# Patient Record
Sex: FEMALE | Race: WHITE | Employment: UNEMPLOYED | URBAN - METROPOLITAN AREA
[De-identification: names, ages, dates, MRNs, and addresses within clinical notes are randomized per-mention and may not be internally consistent; named-entity substitution may affect disease eponyms.]

---

## 2017-02-09 DIAGNOSIS — E78.00 HYPERCHOLESTEREMIA: ICD-10-CM

## 2017-02-09 RX ORDER — ATORVASTATIN CALCIUM 10 MG/1
TABLET, FILM COATED ORAL
Qty: 90 TABLET | Refills: 0 | Status: SHIPPED | OUTPATIENT
Start: 2017-02-09

## 2017-06-03 ENCOUNTER — HOSPITAL ENCOUNTER (OUTPATIENT)
Dept: MRI IMAGING | Age: 82
Discharge: HOME | End: 2017-06-03
Payer: MEDICARE

## 2017-06-03 DIAGNOSIS — I63.9 CEREBROVASCULAR ACCIDENT (CVA), UNSPECIFIED MECHANISM (HCC): ICD-10-CM

## 2017-06-03 PROCEDURE — 70551 MRI BRAIN STEM W/O DYE: CPT

## 2017-09-18 ENCOUNTER — TELEPHONE (OUTPATIENT)
Dept: ADMINISTRATIVE | Age: 82
End: 2017-09-18

## 2017-10-24 ENCOUNTER — OFFICE VISIT (OUTPATIENT)
Dept: FAMILY MEDICINE CLINIC | Age: 82
End: 2017-10-24

## 2017-10-24 VITALS
RESPIRATION RATE: 16 BRPM | DIASTOLIC BLOOD PRESSURE: 70 MMHG | HEART RATE: 54 BPM | BODY MASS INDEX: 32.8 KG/M2 | TEMPERATURE: 96.7 F | WEIGHT: 209 LBS | OXYGEN SATURATION: 98 % | HEIGHT: 67 IN | SYSTOLIC BLOOD PRESSURE: 110 MMHG

## 2017-10-24 DIAGNOSIS — Z78.0 ASYMPTOMATIC MENOPAUSAL STATE: ICD-10-CM

## 2017-10-24 DIAGNOSIS — Z13.6 SCREENING FOR AAA (ABDOMINAL AORTIC ANEURYSM): ICD-10-CM

## 2017-10-24 DIAGNOSIS — Z12.31 ENCOUNTER FOR SCREENING MAMMOGRAM FOR BREAST CANCER: ICD-10-CM

## 2017-10-24 DIAGNOSIS — E11.9 TYPE 2 DIABETES MELLITUS WITHOUT COMPLICATION, WITHOUT LONG-TERM CURRENT USE OF INSULIN (HCC): ICD-10-CM

## 2017-10-24 DIAGNOSIS — Z23 NEED FOR PROPHYLACTIC VACCINATION AGAINST STREPTOCOCCUS PNEUMONIAE (PNEUMOCOCCUS): ICD-10-CM

## 2017-10-24 DIAGNOSIS — Z00.00 ROUTINE GENERAL MEDICAL EXAMINATION AT A HEALTH CARE FACILITY: Primary | ICD-10-CM

## 2017-10-24 DIAGNOSIS — E55.9 VITAMIN D DEFICIENCY: ICD-10-CM

## 2017-10-24 DIAGNOSIS — Z11.3 SCREEN FOR SEXUALLY TRANSMITTED DISEASES: ICD-10-CM

## 2017-10-24 PROBLEM — S52.502A CLOSED FRACTURE OF DISTAL END OF LEFT RADIUS: Status: ACTIVE | Noted: 2017-07-19

## 2017-10-24 PROCEDURE — G8599 NO ASA/ANTIPLAT THER USE RNG: HCPCS | Performed by: FAMILY MEDICINE

## 2017-10-24 PROCEDURE — G0438 PPPS, INITIAL VISIT: HCPCS | Performed by: FAMILY MEDICINE

## 2017-10-24 PROCEDURE — 90670 PCV13 VACCINE IM: CPT | Performed by: FAMILY MEDICINE

## 2017-10-24 PROCEDURE — G0009 ADMIN PNEUMOCOCCAL VACCINE: HCPCS | Performed by: FAMILY MEDICINE

## 2017-10-24 RX ORDER — PROMETHAZINE HYDROCHLORIDE AND CODEINE PHOSPHATE 6.25; 1 MG/5ML; MG/5ML
5 SYRUP ORAL 4 TIMES DAILY PRN
Qty: 240 ML | Refills: 0 | Status: SHIPPED | OUTPATIENT
Start: 2017-10-24 | End: 2018-01-01 | Stop reason: SDUPTHER

## 2017-10-24 ASSESSMENT — ANXIETY QUESTIONNAIRES: GAD7 TOTAL SCORE: 0

## 2017-10-24 ASSESSMENT — LIFESTYLE VARIABLES: HOW OFTEN DO YOU HAVE A DRINK CONTAINING ALCOHOL: 0

## 2017-10-24 ASSESSMENT — PATIENT HEALTH QUESTIONNAIRE - PHQ9: SUM OF ALL RESPONSES TO PHQ QUESTIONS 1-9: 0

## 2017-10-24 NOTE — PATIENT INSTRUCTIONS
Personalized Preventive Plan for [place] - 10/24/2017  Medicare offers a range of preventive health benefits. Some of the tests and screenings are paid in full while other may be subject to a deductible, co-insurance, and/or copay. Some of these benefits include a comprehensive review of your medical history including lifestyle, illnesses that may run in your family, and various assessments and screenings as appropriate. After reviewing your medical record and screening and assessments performed today your provider may have ordered immunizations, labs, imaging, and/or referrals for you. A list of these orders (if applicable) as well as your Preventive Care list are included within your After Visit Summary for your review. Other Preventive Recommendations:    · A preventive eye exam performed by an eye specialist is recommended every 1-2 years to screen for glaucoma; cataracts, macular degeneration, and other eye disorders. · A preventive dental visit is recommended every 6 months. · Try to get at least 150 minutes of exercise per week or 10,000 steps per day on a pedometer . · Order or download the FREE \"Exercise & Physical Activity: Your Everyday Guide\" from [employer]. Call 6-450.138.3825 or search [employer] online. · You need 9325-9912 mg of calcium and 4588-8361 IU of vitamin D per day. It is possible to meet your calcium requirement with diet alone, but a vitamin D supplement is usually necessary to meet this goal.  · When exposed to the sun, use a sunscreen that protects against both UVA and UVB radiation with an SPF of 30 or greater. Reapply every 2 to 3 hours or after sweating, drying off with a towel, or swimming. Always wear a seat belt when traveling in a car. Always wear a helmet when riding a bicycle or motorcycle.      Preventing Falls: Care Instructions  Your Care Instructions  Getting around your home safely can be a challenge if you have injuries or health problems that make it easy for you to fall. Loose rugs and furniture in walkways are among the dangers for many older people who have problems walking or who have poor eyesight. People who have conditions such as arthritis, osteoporosis, or dementia also have to be careful not to fall. You can make your home safer with a few simple measures. Follow-up care is a key part of your treatment and safety. Be sure to make and go to all appointments, and call your doctor if you are having problems. It's also a good idea to know your test results and keep a list of the medicines you take. How can you care for yourself at home? Taking care of yourself  You may get dizzy if you do not drink enough water. To prevent dehydration, drink plenty of fluids, enough so that your urine is light yellow or clear like water. Choose water and other caffeine-free clear liquids. If you have kidney, heart, or liver disease and have to limit fluids, talk with your doctor before you increase the amount of fluids you drink. Exercise regularly to improve your strength, muscle tone, and balance. Walk if you can. Swimming may be a good choice if you cannot walk easily. Have your vision and hearing checked each year or any time you notice a change. If you have trouble seeing and hearing, you might not be able to avoid objects and could lose your balance. Know the side effects of the medicines you take. Ask your doctor or pharmacist whether the medicines you take can affect your balance. Sleeping pills or sedatives can affect your balance. Limit the amount of alcohol you drink. Alcohol can impair your balance and other senses. Ask your doctor whether calluses or corns on your feet need to be removed. If you wear loose-fitting shoes because of calluses or corns, you can lose your balance and fall. Talk to your doctor if you have numbness in your feet.   Preventing falls at home  Remove raised doorway thresholds, throw rugs, and clutter. Repair loose carpet or raised areas in the floor. Move furniture and electrical cords to keep them out of walking paths. Use nonskid floor wax, and wipe up spills right away, especially on ceramic tile floors. If you use a walker or cane, put rubber tips on it. If you use crutches, clean the bottoms of them regularly with an abrasive pad, such as steel wool. Keep your house well lit, especially stairways, porches, and outside walkways. Use night-lights in areas such as hallways and bathrooms. Add extra light switches or use remote switches (such as switches that go on or off when you clap your hands) to make it easier to turn lights on if you have to get up during the night. Install sturdy handrails on stairways. Move items in your cabinets so that the things you use a lot are on the lower shelves (about waist level). Keep a cordless phone and a flashlight with new batteries by your bed. If possible, put a phone in each of the main rooms of your house, or carry a cell phone in case you fall and cannot reach a phone. Or, you can wear a device around your neck or wrist. You push a button that sends a signal for help. Wear low-heeled shoes that fit well and give your feet good support. Use footwear with nonskid soles. Check the heels and soles of your shoes for wear. Repair or replace worn heels or soles. Do not wear socks without shoes on wood floors. Walk on the grass when the sidewalks are slippery. If you live in an area that gets snow and ice in the winter, sprinkle salt on slippery steps and sidewalks. Preventing falls in the bath  Install grab bars and nonskid mats inside and outside your shower or tub and near the toilet and sinks. Use shower chairs and bath benches. Use a hand-held shower head that will allow you to sit while showering.   Get into a tub or shower by putting the weaker leg in first. Get out of a tub or shower with your strong side first.  Repair loose toilet seats and consider installing a raised toilet seat to make getting on and off the toilet easier. Keep your bathroom door unlocked while you are in the shower. Where can you learn more? Go to [email or web address] and sign in to your [employer] account. Enter [phone] in the [facility] box to learn more about \"Preventing Falls: Care Instructions. \"     If you do not have an account, please click on the \"Sign Up Now\" link. Current as of: August 4, 2016  Content Version: 11.3  © 7293-2580 [employer]. Care instructions adapted under license by [facility] ([facility]). If you have questions about a medical condition or this instruction, always ask your healthcare professional. [address] any warranty or liability for your use of this information.   ·

## 2017-10-24 NOTE — PROGRESS NOTES
°C)   TempSrc: Tympanic   SpO2: 98%   Weight: 209 lb (94.8 kg)   Height: 5' 7\" (1.702 m)       General Appearance: alert and oriented to person, place and time, well developed and well- nourished, in no acute distress  Skin: warm and dry, no rash or erythema  Head: normocephalic and atraumatic  Eyes: pupils equal, round, and reactive to light, extraocular eye movements intact, conjunctivae normal  ENT: tympanic membrane, external ear and ear canal normal bilaterally, nose without deformity, nasal mucosa and turbinates normal without polyps  Neck: supple and non-tender without mass, no thyromegaly or thyroid nodules, no cervical lymphadenopathy  Pulmonary/Chest: clear to auscultation bilaterally- no wheezes, rales or rhonchi, normal air movement, no respiratory distress  Cardiovascular: normal rate, regular rhythm, normal S1 and S2, no murmurs, rubs, clicks, or gallops, distal pulses intact, no carotid bruits  Abdomen: soft, non-tender, non-distended, normal bowel sounds, no masses or organomegaly  Extremities: no cyanosis, clubbing or edema  Musculoskeletal: normal range of motion, no joint swelling, deformity or tenderness  Neurologic: reflexes normal and symmetric, no cranial nerve deficit, gait, coordination and speech normal    The following problems were reviewed today and where indicated follow up appointments were made and/or referrals ordered.     Risk Factor Screenings with Interventions:   Fall Risk:  Timed Up and Go Test > 12 seconds?: no  2 or more falls in past year?: no  Fall with injury in past year?: (!) yes  Fall Risk Interventions:    · Home safety tips provided  · Imaging study ordered- DEXA    Depression:  PHQ-2 Score: 0  Depression Interventions:  · None indicated    Anxiety:  Anxiety Score: 0  Anxiety Interventions:  · None indicated    Cognitive:     Cognitive Impairment Interventions:  · None indicated    Substance Abuse:  Social History     Social History Main Topics    Smoking status: Never Smoker    Smokeless tobacco: Never Used    Alcohol use No    Drug use: No    Sexual activity: Not on file     Audit Questionnaire: Screen for Alcohol Misuse  How often do you have a drink containing alcohol?: Never  Substance Abuse Interventions:  · None indicated    Health Risk Assessment:   General  In general, how would you say your health is?: Good  In the past 7 days, have you experienced any of the following?: None of These  Do you get the social and emotional support that you need?: Yes  Do you have a Living Will?: Yes  General Health Risk Interventions:  · None indicated    Health Habits/Nutrition  Do you exercise for at least 20 minutes 2-3 times per week?: (!) No  Have you lost any weight without trying in the past 3 months?: No  Do you eat fewer than 2 meals per day?: No  Have you seen a dentist within the past year?: (!) No  Body mass index is 32.73 kg/m².   Health Habits/Nutrition Interventions:  · Inadequate physical activity:  patient agrees to increase physical activity as follows: as tolerated  · Dental exam overdue:  patient declines dental evaluation    Hearing/Vision  Do you or your family notice any trouble with your hearing?: (!) Yes  Do you have difficulty driving, watching TV, or doing any of your daily activities because of your eyesight?: No  Have you had an eye exam within the past year?: (!) No  Hearing/Vision Interventions:  · Hearing concerns:  patient declines any further evaluation/treatment for hearing issues  · Vision concerns:  patient encouraged to make appointment with his/her eye specialist    Safety  Do you have working smoke detectors?: Yes  Have all throw rugs been removed or fastened?: Yes  Do you have non-slip mats in all bathtubs?: Yes  Do all of your stairways have a railing or banister?: Yes  Are your doorways, halls and stairs free of clutter?: Yes  Do you always fasten your seatbelt when you are in a car?: Yes  Safety Interventions:  · Home safety tips provided    ADLs  In the past 7 days, did you need help from others to perform any of the following everyday activities?: None  In the past 7 days, did you need help from others to take care of any of the following?: None  ADL Interventions:  · None indicated    Personalized Preventive Plan   Current Health Maintenance Status  Immunization History   Administered Date(s) Administered    Influenza Virus Vaccine 09/30/2013, 10/01/2014    Influenza, High Dose 10/01/2015    Pneumococcal Polysaccharide ([id number]) 08/06/2014    Zoster 10/31/2011        Health Maintenance   Topic Date Due    Pneumococcal low/med risk (2 of 2 - PCV13) 08/06/2015    Flu vaccine (1) 11/24/2017 (Originally 9/1/2017)    DTaP/Tdap/Td vaccine (1 - Tdap) 10/24/2018 (Originally 10/25/1954)    Zostavax vaccine  Completed    DEXA (modify frequency per FRAX score)  Completed     Recommendations for Preventive Services Due: see orders.   Recommended screening schedule for the next 5-10 years is provided to the patient in written form: see Patient Instructions/AVS.

## 2017-11-09 ENCOUNTER — TELEPHONE (OUTPATIENT)
Dept: FAMILY MEDICINE CLINIC | Age: 82
End: 2017-11-09

## 2017-11-14 ENCOUNTER — HOSPITAL ENCOUNTER (OUTPATIENT)
Dept: WOMENS IMAGING | Age: 82
Discharge: HOME | End: 2017-11-14
Payer: MEDICARE

## 2017-11-14 ENCOUNTER — HOSPITAL ENCOUNTER (OUTPATIENT)
Dept: ULTRASOUND IMAGING | Age: 82
Discharge: HOME | End: 2017-11-14
Payer: MEDICARE

## 2017-11-14 DIAGNOSIS — Z78.0 ASYMPTOMATIC MENOPAUSAL STATE: ICD-10-CM

## 2017-11-14 DIAGNOSIS — Z13.6 SCREENING FOR AAA (ABDOMINAL AORTIC ANEURYSM): ICD-10-CM

## 2017-11-14 DIAGNOSIS — Z12.31 ENCOUNTER FOR SCREENING MAMMOGRAM FOR BREAST CANCER: ICD-10-CM

## 2017-11-14 PROCEDURE — 77080 DXA BONE DENSITY AXIAL: CPT

## 2017-11-14 PROCEDURE — 77063 BREAST TOMOSYNTHESIS BI: CPT

## 2017-11-14 PROCEDURE — 76706 US ABDL AORTA SCREEN AAA: CPT

## 2017-12-29 ENCOUNTER — APPOINTMENT (OUTPATIENT)
Dept: GENERAL RADIOLOGY | Age: 82
DRG: 470 | End: 2017-12-29
Payer: MEDICARE

## 2017-12-29 ENCOUNTER — HOSPITAL ENCOUNTER (INPATIENT)
Age: 82
LOS: 4 days | Discharge: NURSING, REHAB OR LONG-TERM CARE | DRG: 470 | End: 2018-01-02
Attending: EMERGENCY MEDICINE | Admitting: ORTHOPAEDIC SURGERY
Payer: MEDICARE

## 2017-12-29 DIAGNOSIS — S72.001A CLOSED FRACTURE OF RIGHT HIP, INITIAL ENCOUNTER (HCC): Primary | ICD-10-CM

## 2017-12-29 DIAGNOSIS — S60.211A CONTUSION OF RIGHT WRIST, INITIAL ENCOUNTER: ICD-10-CM

## 2017-12-29 LAB
ABO/RH: NORMAL
ALBUMIN SERPL-MCNC: 3.7 G/DL (ref 3.9–4.9)
ALBUMIN SERPL-MCNC: 3.9 G/DL (ref 3.9–4.9)
ALP BLD-CCNC: 89 U/L (ref 40–130)
ALP BLD-CCNC: 96 U/L (ref 40–130)
ALT SERPL-CCNC: 14 U/L (ref 0–33)
ALT SERPL-CCNC: 14 U/L (ref 0–33)
ANION GAP SERPL CALCULATED.3IONS-SCNC: 13 MEQ/L (ref 7–13)
ANION GAP SERPL CALCULATED.3IONS-SCNC: 14 MEQ/L (ref 7–13)
ANTIBODY SCREEN: NORMAL
APTT: 25.2 SEC (ref 21.6–35.4)
AST SERPL-CCNC: 16 U/L (ref 0–35)
AST SERPL-CCNC: 17 U/L (ref 0–35)
BACTERIA: NORMAL /HPF
BASOPHILS ABSOLUTE: 0 K/UL (ref 0–0.2)
BASOPHILS RELATIVE PERCENT: 0.3 %
BILIRUB SERPL-MCNC: 0.5 MG/DL (ref 0–1.2)
BILIRUB SERPL-MCNC: 0.5 MG/DL (ref 0–1.2)
BILIRUBIN URINE: NEGATIVE
BLOOD, URINE: NEGATIVE
BUN BLDV-MCNC: 21 MG/DL (ref 8–23)
BUN BLDV-MCNC: 22 MG/DL (ref 8–23)
CALCIUM SERPL-MCNC: 10.5 MG/DL (ref 8.6–10.2)
CALCIUM SERPL-MCNC: 9.9 MG/DL (ref 8.6–10.2)
CHLORIDE BLD-SCNC: 101 MEQ/L (ref 98–107)
CHLORIDE BLD-SCNC: 103 MEQ/L (ref 98–107)
CLARITY: CLEAR
CO2: 21 MEQ/L (ref 22–29)
CO2: 21 MEQ/L (ref 22–29)
COLOR: YELLOW
CREAT SERPL-MCNC: 0.67 MG/DL (ref 0.5–0.9)
CREAT SERPL-MCNC: 0.74 MG/DL (ref 0.5–0.9)
EOSINOPHILS ABSOLUTE: 0 K/UL (ref 0–0.7)
EOSINOPHILS RELATIVE PERCENT: 0.1 %
EPITHELIAL CELLS, UA: NORMAL /HPF
GFR AFRICAN AMERICAN: >60
GFR AFRICAN AMERICAN: >60
GFR NON-AFRICAN AMERICAN: >60
GFR NON-AFRICAN AMERICAN: >60
GLOBULIN: 2.6 G/DL (ref 2.3–3.5)
GLOBULIN: 2.8 G/DL (ref 2.3–3.5)
GLUCOSE BLD-MCNC: 169 MG/DL (ref 74–109)
GLUCOSE BLD-MCNC: 173 MG/DL (ref 74–109)
GLUCOSE URINE: NEGATIVE MG/DL
HCT VFR BLD CALC: 36.5 % (ref 37–47)
HEMOGLOBIN: 12 G/DL (ref 12–16)
INR BLD: 1
KETONES, URINE: ABNORMAL MG/DL
LEUKOCYTE ESTERASE, URINE: ABNORMAL
LYMPHOCYTES ABSOLUTE: 0.7 K/UL (ref 1–4.8)
LYMPHOCYTES RELATIVE PERCENT: 6.2 %
MCH RBC QN AUTO: 29.4 PG (ref 27–31.3)
MCHC RBC AUTO-ENTMCNC: 32.9 % (ref 33–37)
MCV RBC AUTO: 89.5 FL (ref 82–100)
MONOCYTES ABSOLUTE: 0.9 K/UL (ref 0.2–0.8)
MONOCYTES RELATIVE PERCENT: 7.5 %
NEUTROPHILS ABSOLUTE: 9.7 K/UL (ref 1.4–6.5)
NEUTROPHILS RELATIVE PERCENT: 85.9 %
NITRITE, URINE: NEGATIVE
PDW BLD-RTO: 14.4 % (ref 11.5–14.5)
PH UA: 7.5 (ref 5–9)
PLATELET # BLD: 401 K/UL (ref 130–400)
POTASSIUM SERPL-SCNC: 4.3 MEQ/L (ref 3.5–5.1)
POTASSIUM SERPL-SCNC: 4.3 MEQ/L (ref 3.5–5.1)
PROTEIN UA: NEGATIVE MG/DL
PROTHROMBIN TIME: 10.9 SEC (ref 8.1–13.7)
RBC # BLD: 4.08 M/UL (ref 4.2–5.4)
RBC UA: NORMAL /HPF (ref 0–2)
SODIUM BLD-SCNC: 135 MEQ/L (ref 132–144)
SODIUM BLD-SCNC: 138 MEQ/L (ref 132–144)
SPECIFIC GRAVITY UA: 1.01 (ref 1–1.03)
TOTAL PROTEIN: 6.3 G/DL (ref 6.4–8.1)
TOTAL PROTEIN: 6.7 G/DL (ref 6.4–8.1)
TROPONIN: <0.01 NG/ML (ref 0–0.01)
URINE REFLEX TO CULTURE: YES
UROBILINOGEN, URINE: 1 E.U./DL
WBC # BLD: 11.3 K/UL (ref 4.8–10.8)
WBC UA: NORMAL /HPF (ref 0–5)

## 2017-12-29 PROCEDURE — 87086 URINE CULTURE/COLONY COUNT: CPT

## 2017-12-29 PROCEDURE — 36415 COLL VENOUS BLD VENIPUNCTURE: CPT

## 2017-12-29 PROCEDURE — 73552 X-RAY EXAM OF FEMUR 2/>: CPT

## 2017-12-29 PROCEDURE — 99284 EMERGENCY DEPT VISIT MOD MDM: CPT

## 2017-12-29 PROCEDURE — 71010 XR CHEST LIMITED: CPT

## 2017-12-29 PROCEDURE — 85610 PROTHROMBIN TIME: CPT

## 2017-12-29 PROCEDURE — 85730 THROMBOPLASTIN TIME PARTIAL: CPT

## 2017-12-29 PROCEDURE — 2580000003 HC RX 258: Performed by: EMERGENCY MEDICINE

## 2017-12-29 PROCEDURE — 84484 ASSAY OF TROPONIN QUANT: CPT

## 2017-12-29 PROCEDURE — 6360000002 HC RX W HCPCS: Performed by: EMERGENCY MEDICINE

## 2017-12-29 PROCEDURE — 86900 BLOOD TYPING SEROLOGIC ABO: CPT

## 2017-12-29 PROCEDURE — 86920 COMPATIBILITY TEST SPIN: CPT

## 2017-12-29 PROCEDURE — 86901 BLOOD TYPING SEROLOGIC RH(D): CPT

## 2017-12-29 PROCEDURE — 96361 HYDRATE IV INFUSION ADD-ON: CPT

## 2017-12-29 PROCEDURE — 96375 TX/PRO/DX INJ NEW DRUG ADDON: CPT

## 2017-12-29 PROCEDURE — 85025 COMPLETE CBC W/AUTO DIFF WBC: CPT

## 2017-12-29 PROCEDURE — 80053 COMPREHEN METABOLIC PANEL: CPT

## 2017-12-29 PROCEDURE — 81001 URINALYSIS AUTO W/SCOPE: CPT

## 2017-12-29 PROCEDURE — 1210000000 HC MED SURG R&B

## 2017-12-29 PROCEDURE — 72170 X-RAY EXAM OF PELVIS: CPT

## 2017-12-29 PROCEDURE — 73562 X-RAY EXAM OF KNEE 3: CPT

## 2017-12-29 PROCEDURE — 96374 THER/PROPH/DIAG INJ IV PUSH: CPT

## 2017-12-29 PROCEDURE — 86850 RBC ANTIBODY SCREEN: CPT

## 2017-12-29 RX ORDER — OXYCODONE HYDROCHLORIDE AND ACETAMINOPHEN 5; 325 MG/1; MG/1
1 TABLET ORAL EVERY 4 HOURS PRN
Status: DISCONTINUED | OUTPATIENT
Start: 2017-12-29 | End: 2018-01-02 | Stop reason: HOSPADM

## 2017-12-29 RX ORDER — 0.9 % SODIUM CHLORIDE 0.9 %
500 INTRAVENOUS SOLUTION INTRAVENOUS ONCE
Status: COMPLETED | OUTPATIENT
Start: 2017-12-29 | End: 2017-12-29

## 2017-12-29 RX ORDER — OXYCODONE HYDROCHLORIDE AND ACETAMINOPHEN 5; 325 MG/1; MG/1
2 TABLET ORAL EVERY 4 HOURS PRN
Status: DISCONTINUED | OUTPATIENT
Start: 2017-12-29 | End: 2018-01-02 | Stop reason: HOSPADM

## 2017-12-29 RX ORDER — ONDANSETRON 2 MG/ML
4 INJECTION INTRAMUSCULAR; INTRAVENOUS EVERY 6 HOURS PRN
Status: DISCONTINUED | OUTPATIENT
Start: 2017-12-29 | End: 2018-01-02 | Stop reason: HOSPADM

## 2017-12-29 RX ORDER — ONDANSETRON 2 MG/ML
4 INJECTION INTRAMUSCULAR; INTRAVENOUS ONCE
Status: COMPLETED | OUTPATIENT
Start: 2017-12-29 | End: 2017-12-29

## 2017-12-29 RX ORDER — MORPHINE SULFATE 4 MG/ML
4 INJECTION, SOLUTION INTRAMUSCULAR; INTRAVENOUS
Status: DISCONTINUED | OUTPATIENT
Start: 2017-12-29 | End: 2018-01-02 | Stop reason: HOSPADM

## 2017-12-29 RX ORDER — SODIUM CHLORIDE 9 MG/ML
INJECTION, SOLUTION INTRAVENOUS CONTINUOUS
Status: DISCONTINUED | OUTPATIENT
Start: 2017-12-29 | End: 2018-01-02 | Stop reason: HOSPADM

## 2017-12-29 RX ORDER — SODIUM CHLORIDE 0.9 % (FLUSH) 0.9 %
3 SYRINGE (ML) INJECTION EVERY 8 HOURS
Status: DISCONTINUED | OUTPATIENT
Start: 2017-12-29 | End: 2018-01-02 | Stop reason: HOSPADM

## 2017-12-29 RX ORDER — SODIUM CHLORIDE 0.9 % (FLUSH) 0.9 %
10 SYRINGE (ML) INJECTION EVERY 12 HOURS SCHEDULED
Status: DISCONTINUED | OUTPATIENT
Start: 2017-12-29 | End: 2017-12-30 | Stop reason: HOSPADM

## 2017-12-29 RX ORDER — MORPHINE SULFATE 2 MG/ML
2 INJECTION, SOLUTION INTRAMUSCULAR; INTRAVENOUS
Status: DISCONTINUED | OUTPATIENT
Start: 2017-12-29 | End: 2018-01-02 | Stop reason: HOSPADM

## 2017-12-29 RX ORDER — SODIUM CHLORIDE 0.9 % (FLUSH) 0.9 %
10 SYRINGE (ML) INJECTION PRN
Status: DISCONTINUED | OUTPATIENT
Start: 2017-12-29 | End: 2017-12-30 | Stop reason: HOSPADM

## 2017-12-29 RX ADMIN — Medication 2 MG: at 23:54

## 2017-12-29 RX ADMIN — ONDANSETRON 4 MG: 2 INJECTION INTRAMUSCULAR; INTRAVENOUS at 20:42

## 2017-12-29 RX ADMIN — SODIUM CHLORIDE, PRESERVATIVE FREE 10 ML: 5 INJECTION INTRAVENOUS at 20:42

## 2017-12-29 RX ADMIN — SODIUM CHLORIDE 500 ML: 9 INJECTION, SOLUTION INTRAVENOUS at 20:42

## 2017-12-29 RX ADMIN — HYDROMORPHONE HYDROCHLORIDE 1 MG: 1 INJECTION, SOLUTION INTRAMUSCULAR; INTRAVENOUS; SUBCUTANEOUS at 20:42

## 2017-12-29 RX ADMIN — SODIUM CHLORIDE: 9 INJECTION, SOLUTION INTRAVENOUS at 23:52

## 2017-12-29 ASSESSMENT — PAIN SCALES - GENERAL
PAINLEVEL_OUTOF10: 0
PAINLEVEL_OUTOF10: 0
PAINLEVEL_OUTOF10: 7
PAINLEVEL_OUTOF10: 7

## 2017-12-29 ASSESSMENT — PAIN DESCRIPTION - LOCATION: LOCATION: HIP

## 2017-12-29 ASSESSMENT — PAIN DESCRIPTION - PAIN TYPE: TYPE: ACUTE PAIN

## 2017-12-29 ASSESSMENT — PAIN DESCRIPTION - ORIENTATION: ORIENTATION: RIGHT

## 2017-12-30 ENCOUNTER — APPOINTMENT (OUTPATIENT)
Dept: GENERAL RADIOLOGY | Age: 82
DRG: 470 | End: 2017-12-30
Payer: MEDICARE

## 2017-12-30 ENCOUNTER — ANESTHESIA (OUTPATIENT)
Dept: OPERATING ROOM | Age: 82
DRG: 470 | End: 2017-12-30
Payer: MEDICARE

## 2017-12-30 ENCOUNTER — ANESTHESIA EVENT (OUTPATIENT)
Dept: OPERATING ROOM | Age: 82
DRG: 470 | End: 2017-12-30
Payer: MEDICARE

## 2017-12-30 VITALS
RESPIRATION RATE: 16 BRPM | OXYGEN SATURATION: 98 % | TEMPERATURE: 97.3 F | DIASTOLIC BLOOD PRESSURE: 95 MMHG | SYSTOLIC BLOOD PRESSURE: 214 MMHG

## 2017-12-30 LAB
ANION GAP SERPL CALCULATED.3IONS-SCNC: 12 MEQ/L (ref 7–13)
BASOPHILS ABSOLUTE: 0 K/UL (ref 0–0.2)
BASOPHILS RELATIVE PERCENT: 0.4 %
BUN BLDV-MCNC: 20 MG/DL (ref 8–23)
CALCIUM SERPL-MCNC: 9.4 MG/DL (ref 8.6–10.2)
CHLORIDE BLD-SCNC: 105 MEQ/L (ref 98–107)
CO2: 22 MEQ/L (ref 22–29)
CREAT SERPL-MCNC: 0.71 MG/DL (ref 0.5–0.9)
EKG ATRIAL RATE: 77 BPM
EKG P AXIS: 22 DEGREES
EKG P-R INTERVAL: 178 MS
EKG Q-T INTERVAL: 408 MS
EKG QRS DURATION: 100 MS
EKG QTC CALCULATION (BAZETT): 461 MS
EKG R AXIS: -17 DEGREES
EKG T AXIS: 87 DEGREES
EKG VENTRICULAR RATE: 77 BPM
EOSINOPHILS ABSOLUTE: 0.2 K/UL (ref 0–0.7)
EOSINOPHILS RELATIVE PERCENT: 3.1 %
GFR AFRICAN AMERICAN: >60
GFR NON-AFRICAN AMERICAN: >60
GLUCOSE BLD-MCNC: 125 MG/DL (ref 60–115)
GLUCOSE BLD-MCNC: 126 MG/DL (ref 74–109)
GLUCOSE BLD-MCNC: 156 MG/DL (ref 60–115)
HBA1C MFR BLD: 6.5 % (ref 4.8–5.9)
HCT VFR BLD CALC: 32.8 % (ref 37–47)
HEMOGLOBIN: 11 G/DL (ref 12–16)
LYMPHOCYTES ABSOLUTE: 1.2 K/UL (ref 1–4.8)
LYMPHOCYTES RELATIVE PERCENT: 15.2 %
MCH RBC QN AUTO: 29.9 PG (ref 27–31.3)
MCHC RBC AUTO-ENTMCNC: 33.5 % (ref 33–37)
MCV RBC AUTO: 89.2 FL (ref 82–100)
MONOCYTES ABSOLUTE: 0.8 K/UL (ref 0.2–0.8)
MONOCYTES RELATIVE PERCENT: 10.7 %
NEUTROPHILS ABSOLUTE: 5.5 K/UL (ref 1.4–6.5)
NEUTROPHILS RELATIVE PERCENT: 70.6 %
PDW BLD-RTO: 14.4 % (ref 11.5–14.5)
PERFORMED ON: ABNORMAL
PERFORMED ON: ABNORMAL
PLATELET # BLD: 368 K/UL (ref 130–400)
POTASSIUM SERPL-SCNC: 4.5 MEQ/L (ref 3.5–5.1)
RBC # BLD: 3.68 M/UL (ref 4.2–5.4)
SODIUM BLD-SCNC: 139 MEQ/L (ref 132–144)
WBC # BLD: 7.8 K/UL (ref 4.8–10.8)

## 2017-12-30 PROCEDURE — 2580000003 HC RX 258: Performed by: ORTHOPAEDIC SURGERY

## 2017-12-30 PROCEDURE — 6360000002 HC RX W HCPCS: Performed by: ORTHOPAEDIC SURGERY

## 2017-12-30 PROCEDURE — 6370000000 HC RX 637 (ALT 250 FOR IP): Performed by: EMERGENCY MEDICINE

## 2017-12-30 PROCEDURE — 83036 HEMOGLOBIN GLYCOSYLATED A1C: CPT

## 2017-12-30 PROCEDURE — 3700000001 HC ADD 15 MINUTES (ANESTHESIA): Performed by: ORTHOPAEDIC SURGERY

## 2017-12-30 PROCEDURE — 6360000002 HC RX W HCPCS: Performed by: ANESTHESIOLOGY

## 2017-12-30 PROCEDURE — 85025 COMPLETE CBC W/AUTO DIFF WBC: CPT

## 2017-12-30 PROCEDURE — 6370000000 HC RX 637 (ALT 250 FOR IP): Performed by: NURSE PRACTITIONER

## 2017-12-30 PROCEDURE — 0SRR0J9 REPLACEMENT OF RIGHT HIP JOINT, FEMORAL SURFACE WITH SYNTHETIC SUBSTITUTE, CEMENTED, OPEN APPROACH: ICD-10-PCS | Performed by: ORTHOPAEDIC SURGERY

## 2017-12-30 PROCEDURE — 3700000000 HC ANESTHESIA ATTENDED CARE: Performed by: ORTHOPAEDIC SURGERY

## 2017-12-30 PROCEDURE — C1776 JOINT DEVICE (IMPLANTABLE): HCPCS | Performed by: ORTHOPAEDIC SURGERY

## 2017-12-30 PROCEDURE — 7100000001 HC PACU RECOVERY - ADDTL 15 MIN: Performed by: ORTHOPAEDIC SURGERY

## 2017-12-30 PROCEDURE — 3600000004 HC SURGERY LEVEL 4 BASE: Performed by: ORTHOPAEDIC SURGERY

## 2017-12-30 PROCEDURE — 80048 BASIC METABOLIC PNL TOTAL CA: CPT

## 2017-12-30 PROCEDURE — 36415 COLL VENOUS BLD VENIPUNCTURE: CPT

## 2017-12-30 PROCEDURE — 6370000000 HC RX 637 (ALT 250 FOR IP): Performed by: ORTHOPAEDIC SURGERY

## 2017-12-30 PROCEDURE — 2500000003 HC RX 250 WO HCPCS: Performed by: ANESTHESIOLOGY

## 2017-12-30 PROCEDURE — 3600000014 HC SURGERY LEVEL 4 ADDTL 15MIN: Performed by: ORTHOPAEDIC SURGERY

## 2017-12-30 PROCEDURE — 73502 X-RAY EXAM HIP UNI 2-3 VIEWS: CPT

## 2017-12-30 PROCEDURE — 6370000000 HC RX 637 (ALT 250 FOR IP): Performed by: INTERNAL MEDICINE

## 2017-12-30 PROCEDURE — C1729 CATH, DRAINAGE: HCPCS | Performed by: ORTHOPAEDIC SURGERY

## 2017-12-30 PROCEDURE — C1713 ANCHOR/SCREW BN/BN,TIS/BN: HCPCS | Performed by: ORTHOPAEDIC SURGERY

## 2017-12-30 PROCEDURE — 1210000000 HC MED SURG R&B

## 2017-12-30 PROCEDURE — 2580000003 HC RX 258: Performed by: ANESTHESIOLOGY

## 2017-12-30 PROCEDURE — 2720000010 HC SURG SUPPLY STERILE: Performed by: ORTHOPAEDIC SURGERY

## 2017-12-30 PROCEDURE — 93005 ELECTROCARDIOGRAM TRACING: CPT

## 2017-12-30 PROCEDURE — 7100000000 HC PACU RECOVERY - FIRST 15 MIN: Performed by: ORTHOPAEDIC SURGERY

## 2017-12-30 DEVICE — IMPLANTABLE DEVICE: Type: IMPLANTABLE DEVICE | Status: FUNCTIONAL

## 2017-12-30 DEVICE — VERSYS DISTAL CENTRALIZER 10MM: Type: IMPLANTABLE DEVICE | Status: FUNCTIONAL

## 2017-12-30 DEVICE — CEMENT RESTRICTOR C/M 16MM: Type: IMPLANTABLE DEVICE | Status: FUNCTIONAL

## 2017-12-30 DEVICE — CEMENT BNE 20ML 40GM ACRYL RESIN HI VISC RADPQ FAST SET: Type: IMPLANTABLE DEVICE | Status: FUNCTIONAL

## 2017-12-30 DEVICE — VERSYS CEM LD/FX SZ 12X125MM: Type: IMPLANTABLE DEVICE | Status: FUNCTIONAL

## 2017-12-30 RX ORDER — MAGNESIUM HYDROXIDE 1200 MG/15ML
LIQUID ORAL CONTINUOUS PRN
Status: DISCONTINUED | OUTPATIENT
Start: 2017-12-30 | End: 2017-12-30 | Stop reason: HOSPADM

## 2017-12-30 RX ORDER — FENTANYL CITRATE 50 UG/ML
50 INJECTION, SOLUTION INTRAMUSCULAR; INTRAVENOUS EVERY 10 MIN PRN
Status: DISCONTINUED | OUTPATIENT
Start: 2017-12-30 | End: 2017-12-30 | Stop reason: HOSPADM

## 2017-12-30 RX ORDER — SODIUM CHLORIDE 0.9 % (FLUSH) 0.9 %
10 SYRINGE (ML) INJECTION EVERY 12 HOURS SCHEDULED
Status: DISCONTINUED | OUTPATIENT
Start: 2017-12-30 | End: 2018-01-02 | Stop reason: HOSPADM

## 2017-12-30 RX ORDER — ONDANSETRON 2 MG/ML
4 INJECTION INTRAMUSCULAR; INTRAVENOUS EVERY 6 HOURS PRN
Status: DISCONTINUED | OUTPATIENT
Start: 2017-12-30 | End: 2017-12-30 | Stop reason: SDUPTHER

## 2017-12-30 RX ORDER — DIPHENHYDRAMINE HYDROCHLORIDE 50 MG/ML
12.5 INJECTION INTRAMUSCULAR; INTRAVENOUS
Status: DISCONTINUED | OUTPATIENT
Start: 2017-12-30 | End: 2017-12-30 | Stop reason: HOSPADM

## 2017-12-30 RX ORDER — LIDOCAINE HYDROCHLORIDE 10 MG/ML
INJECTION, SOLUTION EPIDURAL; INFILTRATION; INTRACAUDAL; PERINEURAL PRN
Status: DISCONTINUED | OUTPATIENT
Start: 2017-12-30 | End: 2017-12-30 | Stop reason: SDUPTHER

## 2017-12-30 RX ORDER — ONDANSETRON 2 MG/ML
INJECTION INTRAMUSCULAR; INTRAVENOUS PRN
Status: DISCONTINUED | OUTPATIENT
Start: 2017-12-30 | End: 2017-12-30 | Stop reason: SDUPTHER

## 2017-12-30 RX ORDER — MORPHINE SULFATE 4 MG/ML
4 INJECTION, SOLUTION INTRAMUSCULAR; INTRAVENOUS
Status: DISCONTINUED | OUTPATIENT
Start: 2017-12-30 | End: 2017-12-30 | Stop reason: SDUPTHER

## 2017-12-30 RX ORDER — KETOROLAC TROMETHAMINE 15 MG/ML
15 INJECTION, SOLUTION INTRAMUSCULAR; INTRAVENOUS EVERY 6 HOURS
Status: DISPENSED | OUTPATIENT
Start: 2017-12-30 | End: 2017-12-31

## 2017-12-30 RX ORDER — ATORVASTATIN CALCIUM 10 MG/1
10 TABLET, FILM COATED ORAL DAILY
Status: DISCONTINUED | OUTPATIENT
Start: 2017-12-30 | End: 2018-01-02 | Stop reason: HOSPADM

## 2017-12-30 RX ORDER — MORPHINE SULFATE 2 MG/ML
2 INJECTION, SOLUTION INTRAMUSCULAR; INTRAVENOUS
Status: DISCONTINUED | OUTPATIENT
Start: 2017-12-30 | End: 2017-12-30 | Stop reason: SDUPTHER

## 2017-12-30 RX ORDER — ACETAMINOPHEN 325 MG/1
650 TABLET ORAL EVERY 4 HOURS PRN
Status: DISCONTINUED | OUTPATIENT
Start: 2017-12-30 | End: 2018-01-02 | Stop reason: HOSPADM

## 2017-12-30 RX ORDER — DOCUSATE SODIUM 100 MG/1
100 CAPSULE, LIQUID FILLED ORAL 2 TIMES DAILY
Status: DISCONTINUED | OUTPATIENT
Start: 2017-12-30 | End: 2018-01-02 | Stop reason: HOSPADM

## 2017-12-30 RX ORDER — METOCLOPRAMIDE HYDROCHLORIDE 5 MG/ML
10 INJECTION INTRAMUSCULAR; INTRAVENOUS
Status: DISCONTINUED | OUTPATIENT
Start: 2017-12-30 | End: 2017-12-30 | Stop reason: HOSPADM

## 2017-12-30 RX ORDER — FENTANYL CITRATE 50 UG/ML
INJECTION, SOLUTION INTRAMUSCULAR; INTRAVENOUS PRN
Status: DISCONTINUED | OUTPATIENT
Start: 2017-12-30 | End: 2017-12-30 | Stop reason: SDUPTHER

## 2017-12-30 RX ORDER — SODIUM CHLORIDE, SODIUM LACTATE, POTASSIUM CHLORIDE, CALCIUM CHLORIDE 600; 310; 30; 20 MG/100ML; MG/100ML; MG/100ML; MG/100ML
INJECTION, SOLUTION INTRAVENOUS CONTINUOUS PRN
Status: DISCONTINUED | OUTPATIENT
Start: 2017-12-30 | End: 2017-12-30 | Stop reason: SDUPTHER

## 2017-12-30 RX ORDER — HYDROCODONE BITARTRATE AND ACETAMINOPHEN 5; 325 MG/1; MG/1
1 TABLET ORAL PRN
Status: DISCONTINUED | OUTPATIENT
Start: 2017-12-30 | End: 2017-12-30 | Stop reason: HOSPADM

## 2017-12-30 RX ORDER — DEXTROSE MONOHYDRATE 50 MG/ML
100 INJECTION, SOLUTION INTRAVENOUS PRN
Status: DISCONTINUED | OUTPATIENT
Start: 2017-12-30 | End: 2018-01-02 | Stop reason: HOSPADM

## 2017-12-30 RX ORDER — ROCURONIUM BROMIDE 10 MG/ML
INJECTION, SOLUTION INTRAVENOUS PRN
Status: DISCONTINUED | OUTPATIENT
Start: 2017-12-30 | End: 2017-12-30 | Stop reason: SDUPTHER

## 2017-12-30 RX ORDER — OXYCODONE HYDROCHLORIDE AND ACETAMINOPHEN 5; 325 MG/1; MG/1
2 TABLET ORAL EVERY 4 HOURS PRN
Status: DISCONTINUED | OUTPATIENT
Start: 2017-12-30 | End: 2017-12-30 | Stop reason: SDUPTHER

## 2017-12-30 RX ORDER — OXYCODONE HYDROCHLORIDE AND ACETAMINOPHEN 5; 325 MG/1; MG/1
1 TABLET ORAL EVERY 4 HOURS PRN
Status: DISCONTINUED | OUTPATIENT
Start: 2017-12-30 | End: 2017-12-30 | Stop reason: SDUPTHER

## 2017-12-30 RX ORDER — SODIUM CHLORIDE 9 MG/ML
INJECTION, SOLUTION INTRAVENOUS CONTINUOUS
Status: DISCONTINUED | OUTPATIENT
Start: 2017-12-30 | End: 2018-01-02 | Stop reason: HOSPADM

## 2017-12-30 RX ORDER — HYDROCODONE BITARTRATE AND ACETAMINOPHEN 5; 325 MG/1; MG/1
2 TABLET ORAL PRN
Status: DISCONTINUED | OUTPATIENT
Start: 2017-12-30 | End: 2017-12-30 | Stop reason: HOSPADM

## 2017-12-30 RX ORDER — MEPERIDINE HYDROCHLORIDE 25 MG/ML
12.5 INJECTION INTRAMUSCULAR; INTRAVENOUS; SUBCUTANEOUS EVERY 5 MIN PRN
Status: DISCONTINUED | OUTPATIENT
Start: 2017-12-30 | End: 2017-12-30 | Stop reason: HOSPADM

## 2017-12-30 RX ORDER — ONDANSETRON 2 MG/ML
4 INJECTION INTRAMUSCULAR; INTRAVENOUS
Status: DISCONTINUED | OUTPATIENT
Start: 2017-12-30 | End: 2017-12-30 | Stop reason: HOSPADM

## 2017-12-30 RX ORDER — SENNA AND DOCUSATE SODIUM 50; 8.6 MG/1; MG/1
1 TABLET, FILM COATED ORAL DAILY
Status: DISCONTINUED | OUTPATIENT
Start: 2017-12-30 | End: 2018-01-02 | Stop reason: HOSPADM

## 2017-12-30 RX ORDER — DEXTROSE MONOHYDRATE 25 G/50ML
12.5 INJECTION, SOLUTION INTRAVENOUS PRN
Status: DISCONTINUED | OUTPATIENT
Start: 2017-12-30 | End: 2018-01-02 | Stop reason: HOSPADM

## 2017-12-30 RX ORDER — NICOTINE POLACRILEX 4 MG
15 LOZENGE BUCCAL PRN
Status: DISCONTINUED | OUTPATIENT
Start: 2017-12-30 | End: 2018-01-02 | Stop reason: HOSPADM

## 2017-12-30 RX ORDER — SODIUM CHLORIDE 0.9 % (FLUSH) 0.9 %
10 SYRINGE (ML) INJECTION PRN
Status: DISCONTINUED | OUTPATIENT
Start: 2017-12-30 | End: 2018-01-02 | Stop reason: HOSPADM

## 2017-12-30 RX ORDER — PROPOFOL 10 MG/ML
INJECTION, EMULSION INTRAVENOUS PRN
Status: DISCONTINUED | OUTPATIENT
Start: 2017-12-30 | End: 2017-12-30 | Stop reason: SDUPTHER

## 2017-12-30 RX ORDER — CALCIUM CARBONATE 200(500)MG
500 TABLET,CHEWABLE ORAL 3 TIMES DAILY PRN
Status: DISCONTINUED | OUTPATIENT
Start: 2017-12-30 | End: 2018-01-02 | Stop reason: HOSPADM

## 2017-12-30 RX ADMIN — DOCUSATE SODIUM 100 MG: 100 CAPSULE, LIQUID FILLED ORAL at 21:45

## 2017-12-30 RX ADMIN — SODIUM CHLORIDE: 9 INJECTION, SOLUTION INTRAVENOUS at 21:44

## 2017-12-30 RX ADMIN — Medication 0.1 MG: at 14:31

## 2017-12-30 RX ADMIN — LIDOCAINE HYDROCHLORIDE 30 MG: 10 INJECTION, SOLUTION EPIDURAL; INFILTRATION; INTRACAUDAL; PERINEURAL at 13:28

## 2017-12-30 RX ADMIN — ONDANSETRON 4 MG: 2 INJECTION INTRAMUSCULAR; INTRAVENOUS at 14:45

## 2017-12-30 RX ADMIN — HYDROMORPHONE HYDROCHLORIDE 0.5 MG: 1 INJECTION, SOLUTION INTRAMUSCULAR; INTRAVENOUS; SUBCUTANEOUS at 15:44

## 2017-12-30 RX ADMIN — SODIUM CHLORIDE, POTASSIUM CHLORIDE, SODIUM LACTATE AND CALCIUM CHLORIDE: 600; 310; 30; 20 INJECTION, SOLUTION INTRAVENOUS at 13:15

## 2017-12-30 RX ADMIN — FENTANYL CITRATE 50 MCG: 50 INJECTION, SOLUTION INTRAMUSCULAR; INTRAVENOUS at 13:28

## 2017-12-30 RX ADMIN — SODIUM CHLORIDE, PRESERVATIVE FREE 10 ML: 5 INJECTION INTRAVENOUS at 21:45

## 2017-12-30 RX ADMIN — OXYCODONE HYDROCHLORIDE AND ACETAMINOPHEN 1 TABLET: 5; 325 TABLET ORAL at 22:11

## 2017-12-30 RX ADMIN — Medication 0.2 MG: at 14:20

## 2017-12-30 RX ADMIN — ATORVASTATIN CALCIUM 10 MG: 10 TABLET, FILM COATED ORAL at 21:45

## 2017-12-30 RX ADMIN — PROPOFOL 150 MG: 10 INJECTION, EMULSION INTRAVENOUS at 13:28

## 2017-12-30 RX ADMIN — FENTANYL CITRATE 50 MCG: 50 INJECTION, SOLUTION INTRAMUSCULAR; INTRAVENOUS at 14:06

## 2017-12-30 RX ADMIN — VANCOMYCIN HYDROCHLORIDE 1500 MG: 1 INJECTION, POWDER, LYOPHILIZED, FOR SOLUTION INTRAVENOUS at 13:12

## 2017-12-30 RX ADMIN — ROCURONIUM BROMIDE 50 MG: 10 INJECTION INTRAVENOUS at 13:28

## 2017-12-30 RX ADMIN — VANCOMYCIN HYDROCHLORIDE 1.5 G: 1 INJECTION, POWDER, LYOPHILIZED, FOR SOLUTION INTRAVENOUS at 13:24

## 2017-12-30 RX ADMIN — ANTACID TABLETS 500 MG: 500 TABLET, CHEWABLE ORAL at 22:11

## 2017-12-30 RX ADMIN — OXYCODONE HYDROCHLORIDE AND ACETAMINOPHEN 1 TABLET: 5; 325 TABLET ORAL at 08:34

## 2017-12-30 RX ADMIN — SUGAMMADEX 200 MG: 100 INJECTION, SOLUTION INTRAVENOUS at 14:46

## 2017-12-30 RX ADMIN — HYDROMORPHONE HYDROCHLORIDE 0.5 MG: 1 INJECTION, SOLUTION INTRAMUSCULAR; INTRAVENOUS; SUBCUTANEOUS at 15:32

## 2017-12-30 RX ADMIN — KETOROLAC TROMETHAMINE 15 MG: 15 INJECTION, SOLUTION INTRAMUSCULAR; INTRAVENOUS at 17:10

## 2017-12-30 ASSESSMENT — PULMONARY FUNCTION TESTS
PIF_VALUE: 14
PIF_VALUE: 4
PIF_VALUE: 15
PIF_VALUE: 16
PIF_VALUE: 14
PIF_VALUE: 13
PIF_VALUE: 14
PIF_VALUE: 14
PIF_VALUE: 15
PIF_VALUE: 14
PIF_VALUE: 1
PIF_VALUE: 15
PIF_VALUE: 14
PIF_VALUE: 18
PIF_VALUE: 14
PIF_VALUE: 1
PIF_VALUE: 2
PIF_VALUE: 7
PIF_VALUE: 14
PIF_VALUE: 14
PIF_VALUE: 2
PIF_VALUE: 14
PIF_VALUE: 14
PIF_VALUE: 1
PIF_VALUE: 14
PIF_VALUE: 2
PIF_VALUE: 14
PIF_VALUE: 14
PIF_VALUE: 3
PIF_VALUE: 14
PIF_VALUE: 14
PIF_VALUE: 2
PIF_VALUE: 14
PIF_VALUE: 18
PIF_VALUE: 14
PIF_VALUE: 13
PIF_VALUE: 14
PIF_VALUE: 9
PIF_VALUE: 14
PIF_VALUE: 21
PIF_VALUE: 14
PIF_VALUE: 14
PIF_VALUE: 2
PIF_VALUE: 14
PIF_VALUE: 9
PIF_VALUE: 14
PIF_VALUE: 13
PIF_VALUE: 14
PIF_VALUE: 13
PIF_VALUE: 14
PIF_VALUE: 2
PIF_VALUE: 14
PIF_VALUE: 7
PIF_VALUE: 14
PIF_VALUE: 3
PIF_VALUE: 1
PIF_VALUE: 14
PIF_VALUE: 19
PIF_VALUE: 14
PIF_VALUE: 12
PIF_VALUE: 14
PIF_VALUE: 14

## 2017-12-30 ASSESSMENT — PAIN DESCRIPTION - LOCATION
LOCATION: HIP
LOCATION: HIP

## 2017-12-30 ASSESSMENT — PAIN SCALES - GENERAL
PAINLEVEL_OUTOF10: 0
PAINLEVEL_OUTOF10: 5
PAINLEVEL_OUTOF10: 10
PAINLEVEL_OUTOF10: 8
PAINLEVEL_OUTOF10: 0
PAINLEVEL_OUTOF10: 2
PAINLEVEL_OUTOF10: 1

## 2017-12-30 ASSESSMENT — PAIN DESCRIPTION - PAIN TYPE
TYPE: ACUTE PAIN
TYPE: SURGICAL PAIN

## 2017-12-30 ASSESSMENT — PAIN DESCRIPTION - ORIENTATION
ORIENTATION: RIGHT
ORIENTATION: RIGHT

## 2017-12-30 NOTE — CONSULTS
Consult to Cardiology  Consult performed by: Javier Medina ordered by: David Medellin  Assessment/Recommendations: Consult dictated.   Risk/benefit ratio favours proceeding with surgery  thankyou

## 2017-12-30 NOTE — CARE COORDINATION
SPOKE WITH PATIENT. SHE STATES SHE LIVES ALONE. SHE DOES NOT USE ANY EQUIPMENT. SHE DRIVES. SHE FRACTURED HER HIP AND WILL BE HAVING SURGERY TODAY. SHE WILL NEED PT/OT EVALS TOMORROW. HER #1 CHOICE IS [facility], #2 IS [facility]. HAZEL AWARE TO FOLLOW. WILL NEED REHAB ORDER IF APPROPRIATE.   Electronically signed by Joanna Gurrola RN on 12/30/17 at 11:07 AM

## 2017-12-30 NOTE — CONSULTS
Soft and nontender. EXTREMITIES:  Show no edema. There is minimal prominence of superficial  veins in both lower extremities around the ankles. No peripheral edema. Distal pulses are strong and 3+ out of 4+. The patient has right femoral neck fracture. Chest x-ray shows no minimal  bibasilar atelectasis. This patient has had an echocardiogram in January of 2016 that was reported  to show left ventricular ejection fraction of 65% with decreased left  ventricular cavity size, moderate concentric left ventricular hypertrophy,  diastolic dysfunction, left atrial size was measured at 2.9 cm, PA pressure  of 27 mmHg. EKG from today was reviewed, and showed sinus rhythm with supraventricular  premature beats, ID interval 178 msec, QRS duration 100 msec, QTc 461 msec. The patient has right bundle branch block. ASSESSMENT:  The patient with right femoral neck fracture. She is 80 years  old, with no significant cardiac history, with preserved left ventricular  systolic function, greater than 4 METS level of activity, and with no  clinical signs of heart failure, and no symptoms to suggest angina  pectoris, no examination findings or echo findings to suggest critical  valvular heart disease. Her cardiac risk and benefit ratio favors surgery  and may proceed as planned. Thank you for allowing us to participate in the care of this patient,  please do not hesitate to contact if further questions arise.         Jas Rodarte MD    D: 12/30/2017 10:06:58       T: 12/30/2017 12:10:45     [email or web address]  Job#: [id number]     Doc#: [id number]    CC:  DR. Karen Gallo

## 2017-12-30 NOTE — OP NOTE
[address]                       [address] [facility], [address] [facility]                                 OPERATIVE REPORT    PATIENT NAME: Ivan Pillai                     :        1935  MED REC NO:   [id number]                            ROOM:       Q384  ACCOUNT NO:   [de-identified]                           ADMIT DATE: 2017  PROVIDER:     Kenji Biswas MD            DATE OF PROCEDURE:  2017    PREOPERATIVE DIAGNOSIS:  Right femoral neck fracture. POSTOPERATIVE DIAGNOSIS:  Right femoral neck fracture. OPERATION PERFORMED:  Right hip fracture hemiarthroplasty using Terra size  12 LD/Fx cemented femoral stem with 14-mm head adaptor and 50-mm  endoprosthetic head. SURGEON:  Kenji Biswas M.D.    ASSISTANT:  Mary Pena PA-C. Mary Pena PA-C was present  throughout the entire case. Given the nature of the disease process and  the procedure, a skilled surgical first assistant was necessary during the  case. The assistant was necessary to hold retractors and manipulate the  extremity during the procedure. A certified scrub tech was at the back  table managing the instruments and supplies for the surgical case. ANESTHESIA:  General endotracheal.    COMPLICATIONS:  None. INDICATIONS:  The patient is an 58-year-old female who fell injuring her  right hip. She was found to have a displaced femoral neck fracture. Risks  and benefits of operative stabilization were noted with the patient and  family. These include infection, stiffness, nerve damage, continued pain,  as well as need for subsequent operations. Informed consent was obtained  prior to arrival in the operating room. OPERATIVE PROCEDURE:  Upon arrival, the patient was identified. She was  transported from the stretcher to the operating room table and placed in  supine position. A general endotracheal anesthetic was administered by the  anesthesia staff.   Prior to the start of the case, 1.5 gm of vancomycin was  given intravenously. She  was positioned with her left side down in the  lateral decubitus position with all bony prominences adequately padded. Her right hip and leg were prepped and draped in the usual sterile fashion. A standard posterior approach to the right hip was utilized. The incision  was carried through the subcutaneous tissue and hemostasis was obtained. Fascia was incised in line with the skin incisions. Fat overlying the  external rotators was elevated, and a T-shaped capsular incision was made  in the rotators with flaps tagged for later repair. The head and neck were  then everted. A standard neck cut was made 1 cm above the lesser  trochanter. The head and neck were removed and measured to be 50 mm in  diameter. A canal finder was used to approach the femur followed by power  rasping to size 3 and broaching to size 12. Multiple trials were placed  and a +14 head adaptor with a 50-mm endoprosthetic head was noted to fit  appropriately with good stability and range of motion. A calcar reamer was  placed followed by removal of the trial components. A canal finder was  used to approach the femur followed by power rasping to size 3 and  broaching to size 14. The canal was suctioned dry and  cement restrictor  was placed deep within the canal.  The cement was mixed in the usual third  generation fashion. Cement was inserted followed by placement of Terra  size 12 LD/Fx stem, which was held in place until all cement was fully  hardened. The head and neck were then assembled. The hip was reduced and  noted to be stable through full range of motion. The wound was irrigated  with sterile saline. The rotator was repaired using 0 Vicryl suture. The  rotator was then reattached to bone using #5 Ti-Cron through-bone sutures. An 8-inch Hemovac drain was placed deep within the wound.   Fascia was  repaired using 0 Vicryl suture, subcutaneous

## 2017-12-30 NOTE — ED PROVIDER NOTES
emergency physician with the below findings:      I called regarding the wrist at [place] which shows no acute fracture. Hip films here shows a femoral neck fracture on the right side. We will call Dr. Adan Campbell for admission. Patient has an orthopedist, Dr. Halle Nieves, who is not available this weekend, we talked to his partner about this so the alternative, to stay here was very acceptable to the family and they understand that they will get good care. Condition is stable, this is a very pleasant family very pleasant patient  Interpretation per the Radiologist below, if available at the time of this note:    XR KNEE RIGHT (3 VIEWS)   Final Result      XR PELVIS (1-2 VIEWS)   Final Result      XR FEMUR RIGHT (MIN 2 VIEWS)   Final Result      XR CHEST (SINGLE VIEW FRONTAL)   Final Result            ED BEDSIDE ULTRASOUND:   Performed by ED Physician - none    LABS:  Labs Reviewed   COMPREHENSIVE METABOLIC PANEL   CBC WITH AUTO DIFFERENTIAL   TROPONIN   URINE RT REFLEX TO CULTURE       All other labs were within normal range or not returned as of this dictation. EMERGENCY DEPARTMENT COURSE and DIFFERENTIAL DIAGNOSIS/MDM:   Vitals:    Vitals:    12/29/17 1909   BP: (!) 143/87   Pulse: 85   Resp: 16   Temp: 97.8 °F (36.6 °C)   TempSrc: Oral   SpO2: 98%   Weight: 198 lb (89.8 kg)   Height: 5' 7\" (1.702 m)           MDM    CRITICAL CARE TIME   Total Critical Care time was  minutes, excluding separately reportable procedures. There was a high probability of clinically significant/life threatening deterioration in the patient's condition which required my urgent intervention. CONSULTS:  None    PROCEDURES:  Unless otherwise noted below, none     Procedures    FINAL IMPRESSION      1. Closed fracture of right hip, initial encounter ([facility] [address]    2.  Contusion of right wrist, initial encounter          DISPOSITION/PLAN   DISPOSITION Decision To Admit 12/29/2017 08:50:14 PM      PATIENT REFERRED TO:  No follow-up provider

## 2017-12-30 NOTE — ANESTHESIA POSTPROCEDURE EVALUATION
Department of Anesthesiology  Postprocedure Note    Patient: Linda Garcia  MRN: [id number]  YOB: 1935  Date of evaluation: 12/30/2017  Time:  3:05 PM     Procedure Summary     Date:  12/30/17 Room / Location:  [address]    Anesthesia Start:  0056 Anesthesia Stop:  1414    Procedure:  RIGHT HIP HEMIARTHROPLASTY (Right ) Diagnosis:  (DEGENERATIVE HIP)    Surgeon:  Pipo Davis MD Responsible Provider:  Wayne Cadet MD    Anesthesia Type:  general ASA Status:  3          Anesthesia Type: general    Jada Phase I:      Jada Phase II:      Last vitals: Reviewed and per EMR flowsheets.    139/96-76-96%-97.8-17    Anesthesia Post Evaluation    Patient location during evaluation: PACU  Patient participation: complete - patient participated  Level of consciousness: awake and alert  Pain score: 0  Airway patency: patent  Nausea & Vomiting: no nausea and no vomiting  Complications: no  Cardiovascular status: blood pressure returned to baseline and hemodynamically stable  Respiratory status: acceptable and nasal cannula  Hydration status: euvolemic

## 2017-12-30 NOTE — ANESTHESIA PRE PROCEDURE
Department of Anesthesiology  Preprocedure Note       Name:  Olivia Ibanez   Age:  80 y.o.  :  1935                                          MRN:  [id number]         Date:  2017      Surgeon: Dereck Caro):  Gloria Campos MD    Procedure: Procedure(s):  HIP HEMIARTHROPLASTY    Medications prior to admission:   Prior to Admission medications    Medication Sig Start Date End Date Taking? Authorizing Provider   metFORMIN (GLUCOPHAGE) 500 MG tablet TAKE ONE TABLET BY MOUTH TWICE A DAY WITH MEALS 17  Yes Makenna Livingston DO   glucose blood VI test strips (ONE TOUCH ULTRA TEST) strip TEST ONE TIME DAILY Dx: DM II - E11.9 17  Yes Makenna Livingston DO   atorvastatin (LIPITOR) 10 MG tablet TAKE 1 TABLET DAILY 17  Yes Makenna Livingston DO   clobetasol prop emollient base 0.05 % CREA Apply topically 2 times daily 3/25/16  Yes Makenna Livingston DO   Lancets MISC Test once daily  DX: 250.00 14  Yes Makenna Livingston DO   aspirin EC 81 MG EC tablet Take 1 tablet by mouth daily.  13  Yes Scott Comer MD       Current medications:    Current Facility-Administered Medications   Medication Dose Route Frequency Provider Last Rate Last Dose    atorvastatin (LIPITOR) tablet 10 mg  10 mg Oral Daily Wing Estrada Dunne, DO        glucose (GLUTOSE) 40 % oral gel 15 g  15 g Oral PRN [facility] Sedar, DO        dextrose 50 % solution 12.5 g  12.5 g Intravenous PRN Sydney Tomas Sedar, DO        glucagon (rDNA) injection 1 mg  1 mg Intramuscular PRN [facility]        dextrose 5 % solution  100 mL/hr Intravenous PRN Rawrome Tomas Sedar, DO        insulin lispro (HUMALOG) injection vial 0-6 Units  0-6 Units Subcutaneous 4 times per day Sydney Beauchampar, DO        sodium chloride flush 0.9 % injection 3 mL  3 mL Intravenous Q8H Melvin Ramsey MD   10 mL at 17    0.9 % sodium chloride infusion   Intravenous Continuous Melvin Ramsey MD 50 mL/hr at 172      sodium chloride flush 0.9 % injection 10 mL  10 mL Intravenous 2 times per day Lise Lr MD        sodium chloride flush 0.9 % injection 10 mL  10 mL Intravenous PRN Lise Lr MD        oxyCODONE-acetaminophen (PERCOCET) 5-325 MG per tablet 1 tablet  1 tablet Oral Q4H PRN Lise Lr MD   1 tablet at 12/30/17 5836    Or    oxyCODONE-acetaminophen (PERCOCET) 5-325 MG per tablet 2 tablet  2 tablet Oral Q4H PRN Lise Lr MD        morphine injection 2 mg  2 mg Intravenous Q2H PRN Lise Lr MD   2 mg at 12/29/17 2354    Or    morphine injection 4 mg  4 mg Intravenous Q2H PRCLIFF Lr MD        ondansetron [facility]) injection 4 mg  4 mg Intravenous Q6H PRN Lise Lr MD        Influenza Vac Typ A&B Surf Ant (FLUVIRIN) IM injection 0.5 mL  0.5 mL Intramuscular Prior to discharge Sally Nice MD           Allergies:     Allergies   Allergen Reactions    Cephalexin        Problem List:    Patient Active Problem List   Diagnosis Code    Panic attacks F41.0    Varicose veins I83.90    Type 2 diabetes mellitus (HCC) E11.9    Venous stasis dermatitis I87.2    Seborrheic keratosis L82.1    Hypercholesteremia E78.00    Essential hypertension I10    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    History of cataract extraction Z98.49    Blepharitis of right eye H01.003    Punctate keratitis H16.149    Closed fracture of distal end of left radius S52.502A    Vitamin D deficiency E55.9    Fracture of hip, closed, right, initial encounter ([facility] S72.001A       Past Medical History:        Diagnosis Date    Chronic kidney disease     CVA (cerebral vascular accident) ([address] 01/2016    Panic attacks     Type 2 diabetes mellitus without complication ([address]     Urinary incontinence        Past Surgical History:        Procedure Laterality Date    APPENDECTOMY  1952    COLONOSCOPY      EYE SURGERY      FRACTURE SURGERY      L wrist    UPPER GASTROINTESTINAL ENDOSCOPY  4/4/03       Social History:    Social History   Substance Use Topics    Smoking status: Never Smoker    Smokeless tobacco: Never Used    Alcohol use No                                Counseling given: Not Answered      Vital Signs (Current):   Vitals:    12/29/17 1909 12/29/17 2141 12/29/17 2245 12/30/17 0834   BP: (!) 143/87 (!) 140/78 (!) 162/82 (!) 155/80   Pulse: 85 88 86 86   Resp: 16 16 16 18   Temp: 97.8 °F (36.6 °C)  95.7 °F (35.4 °C) 96 °F (35.6 °C)   TempSrc: Oral  Oral Oral   SpO2: 98% 98%     Weight: 198 lb (89.8 kg)  211 lb 6.7 oz (95.9 kg)    Height: 5' 7\" (1.702 m)  5' 7\" (1.702 m)                                               BP Readings from Last 3 Encounters:   12/30/17 (!) 155/80   10/24/17 110/70   12/20/16 122/80       NPO Status: Time of last liquid consumption: 2300                        Time of last solid consumption: 1230                        Date of last liquid consumption: 12/29/17                        Date of last solid food consumption: 12/29/17    BMI:   Wt Readings from Last 3 Encounters:   12/29/17 211 lb 6.7 oz (95.9 kg)   10/24/17 209 lb (94.8 kg)   07/26/16 211 lb (95.7 kg)     Body mass index is 33.11 kg/m².     CBC:   Lab Results   Component Value Date    WBC 7.8 12/30/2017    RBC 3.68 12/30/2017    HGB 11.0 12/30/2017    HCT 32.8 12/30/2017    MCV 89.2 12/30/2017    RDW 14.4 12/30/2017     12/30/2017       CMP:   Lab Results   Component Value Date     12/30/2017    K 4.5 12/30/2017     12/30/2017    CO2 22 12/30/2017    BUN 20 12/30/2017    CREATININE 0.71 12/30/2017    GFRAA >60.0 12/30/2017    LABGLOM >60.0 12/30/2017    GLUCOSE 126 12/30/2017    PROT 6.3 12/29/2017    CALCIUM 9.4 12/30/2017    BILITOT 0.5 12/29/2017    ALKPHOS 89 12/29/2017    AST 16 12/29/2017    ALT 14 12/29/2017       POC Tests:   Recent Labs      12/30/17   0753   POCGLU  125*       Coags:   Lab Results   Component Value Date    PROTIME 10.9 12/29/2017    INR 1.0 12/29/2017    APTT 25.2 12/29/2017       HCG (If Applicable): No results found for: PREGTESTUR, PREGSERUM, HCG, HCGQUANT     ABGs: No results found for: PHART, PO2ART, YTJ4RNA, GYR6XHI, BEART, H6PENFHM     Type & Screen (If Applicable):  No results found for: LABABO, [address]    Anesthesia Evaluation  Patient summary reviewed and Nursing notes reviewed no history of anesthetic complications:   Airway: Mallampati: II  TM distance: >3 FB   Neck ROM: full  Mouth opening: > = 3 FB Dental:    (+) upper dentures and lower dentures      Pulmonary:                              Cardiovascular:    (+) hypertension:,                   Neuro/Psych:   (+) CVA:,             GI/Hepatic/Renal:   (+) morbid obesity          Endo/Other:    (+) Type II DM, , blood dyscrasia: anemia:., .                 Abdominal:           Vascular:                                      Anesthesia Plan      general     ASA 3       Induction: intravenous. MIPS: Postoperative opioids intended and Prophylactic antiemetics administered. Anesthetic plan and risks discussed with patient and child/children.         Attending anesthesiologist reviewed and agrees with Oksana Bentley MD   12/30/2017

## 2017-12-31 ENCOUNTER — APPOINTMENT (OUTPATIENT)
Dept: GENERAL RADIOLOGY | Age: 82
DRG: 470 | End: 2017-12-31
Payer: MEDICARE

## 2017-12-31 PROBLEM — D64.9 POSTOPERATIVE ANEMIA: Status: ACTIVE | Noted: 2017-12-31

## 2017-12-31 PROBLEM — R26.9 GAIT ABNORMALITY: Status: ACTIVE | Noted: 2017-12-31

## 2017-12-31 PROBLEM — Z86.73 HISTORY OF TIA (TRANSIENT ISCHEMIC ATTACK): Status: ACTIVE | Noted: 2017-12-31

## 2017-12-31 LAB
ANION GAP SERPL CALCULATED.3IONS-SCNC: 10 MEQ/L (ref 7–13)
BASOPHILS ABSOLUTE: 0 K/UL (ref 0–0.2)
BASOPHILS RELATIVE PERCENT: 0.5 %
BUN BLDV-MCNC: 21 MG/DL (ref 8–23)
CALCIUM SERPL-MCNC: 9.7 MG/DL (ref 8.6–10.2)
CHLORIDE BLD-SCNC: 103 MEQ/L (ref 98–107)
CO2: 24 MEQ/L (ref 22–29)
CREAT SERPL-MCNC: 0.84 MG/DL (ref 0.5–0.9)
EOSINOPHILS ABSOLUTE: 0.4 K/UL (ref 0–0.7)
EOSINOPHILS RELATIVE PERCENT: 4.7 %
GFR AFRICAN AMERICAN: >60
GFR NON-AFRICAN AMERICAN: >60
GLUCOSE BLD-MCNC: 145 MG/DL (ref 60–115)
GLUCOSE BLD-MCNC: 146 MG/DL (ref 60–115)
GLUCOSE BLD-MCNC: 167 MG/DL (ref 74–109)
GLUCOSE BLD-MCNC: 172 MG/DL (ref 60–115)
GLUCOSE BLD-MCNC: 174 MG/DL (ref 60–115)
GLUCOSE BLD-MCNC: 175 MG/DL (ref 60–115)
HCT VFR BLD CALC: 28 % (ref 37–47)
HEMOGLOBIN: 9.4 G/DL (ref 12–16)
LYMPHOCYTES ABSOLUTE: 1.1 K/UL (ref 1–4.8)
LYMPHOCYTES RELATIVE PERCENT: 13.5 %
MCH RBC QN AUTO: 30.4 PG (ref 27–31.3)
MCHC RBC AUTO-ENTMCNC: 33.7 % (ref 33–37)
MCV RBC AUTO: 90.1 FL (ref 82–100)
MONOCYTES ABSOLUTE: 0.8 K/UL (ref 0.2–0.8)
MONOCYTES RELATIVE PERCENT: 9.8 %
NEUTROPHILS ABSOLUTE: 6 K/UL (ref 1.4–6.5)
NEUTROPHILS RELATIVE PERCENT: 71.5 %
PDW BLD-RTO: 14.3 % (ref 11.5–14.5)
PERFORMED ON: ABNORMAL
PLATELET # BLD: 303 K/UL (ref 130–400)
POTASSIUM SERPL-SCNC: 4.5 MEQ/L (ref 3.5–5.1)
RBC # BLD: 3.11 M/UL (ref 4.2–5.4)
SODIUM BLD-SCNC: 137 MEQ/L (ref 132–144)
URINE CULTURE, ROUTINE: NORMAL
WBC # BLD: 8.4 K/UL (ref 4.8–10.8)

## 2017-12-31 PROCEDURE — 97163 PT EVAL HIGH COMPLEX 45 MIN: CPT

## 2017-12-31 PROCEDURE — 85025 COMPLETE CBC W/AUTO DIFF WBC: CPT

## 2017-12-31 PROCEDURE — 6370000000 HC RX 637 (ALT 250 FOR IP): Performed by: EMERGENCY MEDICINE

## 2017-12-31 PROCEDURE — 73100 X-RAY EXAM OF WRIST: CPT

## 2017-12-31 PROCEDURE — 6370000000 HC RX 637 (ALT 250 FOR IP): Performed by: INTERNAL MEDICINE

## 2017-12-31 PROCEDURE — 2700000000 HC OXYGEN THERAPY PER DAY

## 2017-12-31 PROCEDURE — 99222 1ST HOSP IP/OBS MODERATE 55: CPT | Performed by: PHYSICAL MEDICINE & REHABILITATION

## 2017-12-31 PROCEDURE — G8979 MOBILITY GOAL STATUS: HCPCS

## 2017-12-31 PROCEDURE — 97535 SELF CARE MNGMENT TRAINING: CPT

## 2017-12-31 PROCEDURE — 1210000000 HC MED SURG R&B

## 2017-12-31 PROCEDURE — 6370000000 HC RX 637 (ALT 250 FOR IP): Performed by: PHYSICAL MEDICINE & REHABILITATION

## 2017-12-31 PROCEDURE — 6360000002 HC RX W HCPCS: Performed by: ORTHOPAEDIC SURGERY

## 2017-12-31 PROCEDURE — G8978 MOBILITY CURRENT STATUS: HCPCS

## 2017-12-31 PROCEDURE — 97112 NEUROMUSCULAR REEDUCATION: CPT

## 2017-12-31 PROCEDURE — 80048 BASIC METABOLIC PNL TOTAL CA: CPT

## 2017-12-31 PROCEDURE — 2580000003 HC RX 258: Performed by: ORTHOPAEDIC SURGERY

## 2017-12-31 PROCEDURE — 6360000002 HC RX W HCPCS: Performed by: EMERGENCY MEDICINE

## 2017-12-31 PROCEDURE — 36415 COLL VENOUS BLD VENIPUNCTURE: CPT

## 2017-12-31 PROCEDURE — 6370000000 HC RX 637 (ALT 250 FOR IP): Performed by: ORTHOPAEDIC SURGERY

## 2017-12-31 RX ORDER — LIDOCAINE 50 MG/G
3 PATCH TOPICAL DAILY
Status: DISCONTINUED | OUTPATIENT
Start: 2017-12-31 | End: 2018-01-02 | Stop reason: HOSPADM

## 2017-12-31 RX ORDER — CINNAMON
1 OIL (ML) MISCELLANEOUS 4 TIMES DAILY
Status: DISCONTINUED | OUTPATIENT
Start: 2017-12-31 | End: 2018-01-02 | Stop reason: HOSPADM

## 2017-12-31 RX ADMIN — SENNOSIDES AND DOCUSATE SODIUM 1 TABLET: 8.6; 5 TABLET ORAL at 08:05

## 2017-12-31 RX ADMIN — DOCUSATE SODIUM 100 MG: 100 CAPSULE, LIQUID FILLED ORAL at 08:05

## 2017-12-31 RX ADMIN — OXYCODONE HYDROCHLORIDE AND ACETAMINOPHEN 1 TABLET: 5; 325 TABLET ORAL at 06:17

## 2017-12-31 RX ADMIN — OXYCODONE HYDROCHLORIDE AND ACETAMINOPHEN 1 TABLET: 5; 325 TABLET ORAL at 10:28

## 2017-12-31 RX ADMIN — VANCOMYCIN HYDROCHLORIDE 1500 MG: 1 INJECTION, POWDER, LYOPHILIZED, FOR SOLUTION INTRAVENOUS at 02:19

## 2017-12-31 RX ADMIN — SODIUM CHLORIDE, PRESERVATIVE FREE 10 ML: 5 INJECTION INTRAVENOUS at 19:45

## 2017-12-31 RX ADMIN — DOCUSATE SODIUM 100 MG: 100 CAPSULE, LIQUID FILLED ORAL at 20:11

## 2017-12-31 RX ADMIN — Medication 1 DROP: at 21:36

## 2017-12-31 RX ADMIN — ENOXAPARIN SODIUM 40 MG: 40 INJECTION, SOLUTION INTRAVENOUS; SUBCUTANEOUS at 08:05

## 2017-12-31 RX ADMIN — ATORVASTATIN CALCIUM 10 MG: 10 TABLET, FILM COATED ORAL at 20:11

## 2017-12-31 RX ADMIN — OXYCODONE HYDROCHLORIDE AND ACETAMINOPHEN 1 TABLET: 5; 325 TABLET ORAL at 17:23

## 2017-12-31 RX ADMIN — INSULIN LISPRO 1 UNITS: 100 INJECTION, SOLUTION INTRAVENOUS; SUBCUTANEOUS at 22:37

## 2017-12-31 RX ADMIN — INSULIN LISPRO 1 UNITS: 100 INJECTION, SOLUTION INTRAVENOUS; SUBCUTANEOUS at 08:12

## 2017-12-31 RX ADMIN — SODIUM CHLORIDE, PRESERVATIVE FREE 10 ML: 5 INJECTION INTRAVENOUS at 19:50

## 2017-12-31 RX ADMIN — Medication 2 MG: at 19:48

## 2017-12-31 RX ADMIN — Medication 1 DROP: at 17:23

## 2017-12-31 ASSESSMENT — PAIN SCALES - GENERAL
PAINLEVEL_OUTOF10: 4
PAINLEVEL_OUTOF10: 2
PAINLEVEL_OUTOF10: 5

## 2017-12-31 NOTE — CONSULTS
Physical Medicine & Rehabilitation  Consult Note      Admitting Physician: Marlon Lanza MD    Primary Care Provider: Piyush Mayers DO     Reason for Consult:  Asses rehab needs, promote physical and mental function, and decrease likelihood of re-admit to the hospital after discharge. History of Present Illness:    Kade Garcia is a 80 y.o. female admitted to [facility] on 12/29/2017. The patient is a 80 y.o. female  with a complicated medical history and recently recovered from  left wrist fracture which was treated with ORIF the [address] [place] [address] Street last summer recently fell fracturing her right hip. She states now that her right wrist is in pain and she denies that she had hurt her right wrist last summer she states that it was her left wrist that was injured last summer. I was able to review the [facility] clinic records and indeed that is corrected with her left wrist and was injured last summer. Patient was going out to dinner in [place], had a mechanical fall getting out of her waller, and a subsequent fall. Came to ED found to have right femoral neck fracture. Admitted to Orthopedics. We are consulted for medical management. She has never had cardiac surgery, never had stenting. History of TIA, however no residual deficits. No EtOH, Tobacco. She had left wrist surgery in July without complications. No full dose anticoagulation. Takes asa 81 daily. No other medications. She was taken to surgery by Dr. Chas Cohen. She is now ready to consider rehabilitation options. SHE LIVES ALONE. SHE DOES NOT USE ANY EQUIPMENT. SHE DRIVES. Knee Pain    The incident occurred more than 1 week ago. The pain is present in the left hip. The pain is at a severity of 9/10. The pain is severe. The pain has been intermittent since onset. Associated symptoms include an inability to bear weight, a loss of motion and muscle weakness. Pertinent negatives include no numbness.  The consolidation. The mediastinum is not widened or shifted. The chest  wall is unremarkable. AP view the pelvis was obtained. There is an angulated, displaced right femoral neck fracture, resulting in varus deformity. There are no other fractures. Bone density is low. 2 views of the right femur were obtained. The right femoral neck fracture is resulting in varus deformity. Bone density is low. There are no other fractures in the right femur. 4 views of the right knee were obtained. There is advanced degenerative disease, greatest in the lateral compartment. There is no joint effusion. There is no fracture. Soft tissues are unremarkable. CONCLUSION: RIGHT FEMORAL NECK FRACTURE WITH VARUS DEFORMITY. SMALL BIBASILAR DEPENDENT PULMONARY ATELECTASIS. Xr Hip Right (2-3 Views)    Result Date: 12/30/2017  EXAMINATION: RIGHT HIP portable view supine  CLINICAL HISTORY: Osteoarthritis COMPARISON: None  FINDINGS: Single view submitted There are multiple surgical staples and drain overlying the right hip. Patient is undergone right total hip arthroplasty. Gross satisfactory alignment. No periimplant fracture                                            STATUS POST RIGHT TOTAL HIP ARTHROPLASTY    Xr Femur Right (min 2 Views)    Result Date: 12/29/2017  EXAMINATION: XR CHEST LIMITED, XR FEMUR RIGHT STANDARD, XR PELVIS STANDARD, XR KNEE RIGHT STANDARD CLINICAL HISTORY: Hip fracture. Preop. Immobility. Pain. COMPARISONS: None available. FINDINGS: Single frontal view the chest was obtained with small inspiratory volume, exaggerating heart size and lung density. There is a trace of bibasilar atelectasis. There is no dense consolidation. The mediastinum is not widened or shifted. The chest  wall is unremarkable. AP view the pelvis was obtained. There is an angulated, displaced right femoral neck fracture, resulting in varus deformity. There are no other fractures. Bone density is low. 2 views of the right femur were obtained.  The right 12/31/2017    CO2 24 12/31/2017    BUN 21 12/31/2017    CREATININE 0.84 12/31/2017    CALCIUM 9.7 12/31/2017    LABALBU 3.7 12/29/2017    BILITOT 0.5 12/29/2017    ALKPHOS 89 12/29/2017    AST 16 12/29/2017    ALT 14 12/29/2017     Lab Results   Component Value Date    WBC 8.4 12/31/2017    RBC 3.11 12/31/2017    HGB 9.4 12/31/2017    HCT 28.0 12/31/2017    MCV 90.1 12/31/2017    MCH 30.4 12/31/2017    MCHC 33.7 12/31/2017    RDW 14.3 12/31/2017     12/31/2017    MPV 8.9 03/26/2014     No results found for: VITD25  Lab Results   Component Value Date    COLORU Yellow 12/29/2017    NITRU Negative 12/29/2017    GLUCOSEU Negative 12/29/2017    KETUA TRACE 12/29/2017    UROBILINOGEN 1.0 12/29/2017    BILIRUBINUR Negative 12/29/2017     Lab Results   Component Value Date    PROTIME 10.9 12/29/2017     Lab Results   Component Value Date    INR 1.0 12/29/2017         I discussed results with patient. Current Rehabilitation Assessments:    Rehabilitation:  Physical therapy: FIMS:  Bed Mobility:      Transfers:  ,  ,      FIMS:  ,  ,      Occupational therapy: FIMS:   ,  ,      Speech therapy: FIMS:           Past Medical History:          Diagnosis Date    Chronic kidney disease     [facility]cere[facility]) [facility] 01/2016    Panic attacks     [facility]     Urinary incontinence          Past Surgical History:          Procedure Laterality Date    APPENDECTOMY  1952    COLONOSCOPY      EYE SURGERY      FRACTURE SURGERY      L wrist    UPPER GASTROINTESTINAL ENDOSCOPY  4/4/03         Allergies:      Allergies   Allergen Reactions    Cephalexin         Current Medications:     Current Facility-Administered Medications: atorvastatin (LIPITOR) tablet 10 mg, 10 mg, Oral, Daily  glucose (GLUTOSE) 40 % oral gel 15 g, 15 g, Oral, PRN  dextrose 50 % solution 12.5 g, 12.5 g, Intravenous, PRN  glucagon (rDNA) injection 1 mg, 1 mg, Intramuscular, PRN  dextrose 5 % not hyperactive, not slowed, not withdrawn, not actively hallucinating and not combative. Thought content is not paranoid and not delusional. Cognition and memory are normal. Cognition and memory are not impaired. She does not express impulsivity or inappropriate judgment. She does not exhibit a depressed mood. She expresses no homicidal and no suicidal ideation. She expresses no suicidal plans and no homicidal plans. She is communicative. She exhibits normal recent memory and normal remote memory. She is attentive. Vitals reviewed. Ortho Exam  Neurologic Exam     Mental Status   Oriented to person, place, and time. Speech: not slurred   Level of consciousness: alert  Knowledge: good. Able to name object. Able to read. Able to repeat. Cranial Nerves     CN III, IV, VI   Pupils are equal, round, and reactive to light. Extraocular motions are normal.     Motor Exam     Strength   Strength 5/5 throughout.      Gait, Coordination, and Reflexes     Tremor   Resting tremor: absent  Intention tremor: absent  Action tremor: absent            Diagnostics:    Recent Results (from the past 24 hour(s))   POCT Glucose    Collection Time: 12/30/17  9:51 PM   Result Value Ref Range    POC Glucose 156 (H) 60 - 115 mg/dl    Performed on ACCU-CHEK    POCT Glucose    Collection Time: 12/31/17  1:30 AM   Result Value Ref Range    POC Glucose 175 (H) 60 - 115 mg/dl    Performed on ACCU-CHEK    CBC Auto Differential    Collection Time: 12/31/17  5:29 AM   Result Value Ref Range    WBC 8.4 4.8 - 10.8 K/uL    RBC 3.11 (L) 4.20 - 5.40 M/uL    Hemoglobin 9.4 (L) 12.0 - 16.0 g/dL    Hematocrit 28.0 (L) 37.0 - 47.0 %    MCV 90.1 82.0 - 100.0 fL    MCH 30.4 27.0 - 31.3 pg    MCHC 33.7 33.0 - 37.0 %    RDW 14.3 11.5 - 14.5 %    Platelets 673 139 - 339 K/uL    Neutrophils % 71.5 %    Lymphocytes % 13.5 %    Monocytes % 9.8 %    Eosinophils % 4.7 %    Basophils % 0.5 %    Neutrophils # 6.0 1.4 - 6.5 K/uL    Lymphocytes # 1.1 1.0 - 4.8 wrist-nonweight bearing to the right wrist and to have his x-rays back      Greater than one hour spent looking at old records talked with patient and family. It was my pleasure to evaluate Radhawil Lorena today. Please call [phone] with questions.     Manny Subramanian, DO

## 2018-01-01 ENCOUNTER — OFFICE VISIT (OUTPATIENT)
Dept: FAMILY MEDICINE CLINIC | Age: 83
End: 2018-01-01
Payer: MEDICARE

## 2018-01-01 ENCOUNTER — TELEPHONE (OUTPATIENT)
Dept: FAMILY MEDICINE CLINIC | Age: 83
End: 2018-01-01

## 2018-01-01 VITALS
SYSTOLIC BLOOD PRESSURE: 110 MMHG | BODY MASS INDEX: 31.98 KG/M2 | TEMPERATURE: 98.2 F | WEIGHT: 199 LBS | OXYGEN SATURATION: 98 % | DIASTOLIC BLOOD PRESSURE: 60 MMHG | HEIGHT: 66 IN | HEART RATE: 73 BPM | RESPIRATION RATE: 12 BRPM

## 2018-01-01 VITALS
OXYGEN SATURATION: 98 % | HEIGHT: 66 IN | SYSTOLIC BLOOD PRESSURE: 110 MMHG | DIASTOLIC BLOOD PRESSURE: 64 MMHG | TEMPERATURE: 97.1 F | RESPIRATION RATE: 15 BRPM | HEART RATE: 74 BPM | WEIGHT: 199 LBS | BODY MASS INDEX: 31.98 KG/M2

## 2018-01-01 DIAGNOSIS — E03.4 HYPOTHYROIDISM DUE TO ACQUIRED ATROPHY OF THYROID: ICD-10-CM

## 2018-01-01 DIAGNOSIS — E66.09 CLASS 1 OBESITY DUE TO EXCESS CALORIES WITH SERIOUS COMORBIDITY AND BODY MASS INDEX (BMI) OF 32.0 TO 32.9 IN ADULT: ICD-10-CM

## 2018-01-01 DIAGNOSIS — E21.3 HYPERPARATHYROIDISM (HCC): ICD-10-CM

## 2018-01-01 DIAGNOSIS — I10 ESSENTIAL HYPERTENSION: ICD-10-CM

## 2018-01-01 DIAGNOSIS — Z23 NEEDS FLU SHOT: ICD-10-CM

## 2018-01-01 DIAGNOSIS — E11.9 TYPE 2 DIABETES MELLITUS WITHOUT COMPLICATION, WITHOUT LONG-TERM CURRENT USE OF INSULIN (HCC): Primary | ICD-10-CM

## 2018-01-01 DIAGNOSIS — E55.9 VITAMIN D DEFICIENCY: ICD-10-CM

## 2018-01-01 DIAGNOSIS — R05.9 COUGH: ICD-10-CM

## 2018-01-01 DIAGNOSIS — E11.9 TYPE 2 DIABETES MELLITUS WITHOUT COMPLICATION, WITHOUT LONG-TERM CURRENT USE OF INSULIN (HCC): ICD-10-CM

## 2018-01-01 DIAGNOSIS — R30.0 DYSURIA: ICD-10-CM

## 2018-01-01 DIAGNOSIS — E78.2 MIXED HYPERLIPIDEMIA: ICD-10-CM

## 2018-01-01 DIAGNOSIS — E21.0 HYPERPARATHYROIDISM, PRIMARY (HCC): ICD-10-CM

## 2018-01-01 LAB
ALBUMIN SERPL-MCNC: 3.8 G/DL (ref 3.9–4.9)
ALBUMIN SERPL-MCNC: 3.9 G/DL (ref 3.9–4.9)
ALP BLD-CCNC: 97 U/L (ref 40–130)
ALP BLD-CCNC: 98 U/L (ref 40–130)
ALT SERPL-CCNC: 10 U/L (ref 0–33)
ALT SERPL-CCNC: 12 U/L (ref 0–33)
ANION GAP SERPL CALCULATED.3IONS-SCNC: 11 MEQ/L (ref 7–13)
ANION GAP SERPL CALCULATED.3IONS-SCNC: 11 MEQ/L (ref 7–13)
ANION GAP SERPL CALCULATED.3IONS-SCNC: 12 MEQ/L (ref 7–13)
AST SERPL-CCNC: 14 U/L (ref 0–35)
AST SERPL-CCNC: 17 U/L (ref 0–35)
BACTERIA: ABNORMAL /HPF
BACTERIA: ABNORMAL /HPF
BASOPHILS ABSOLUTE: 0 K/UL (ref 0–0.2)
BASOPHILS RELATIVE PERCENT: 0.5 %
BASOPHILS RELATIVE PERCENT: 0.8 %
BASOPHILS RELATIVE PERCENT: 0.8 %
BILIRUB SERPL-MCNC: 0.5 MG/DL (ref 0–1.2)
BILIRUB SERPL-MCNC: 0.5 MG/DL (ref 0–1.2)
BILIRUBIN URINE: ABNORMAL
BILIRUBIN URINE: NEGATIVE
BLOOD, URINE: ABNORMAL
BLOOD, URINE: NEGATIVE
BUN BLDV-MCNC: 21 MG/DL (ref 8–23)
BUN BLDV-MCNC: 21 MG/DL (ref 8–23)
BUN BLDV-MCNC: 23 MG/DL (ref 8–23)
CALCIUM SERPL-MCNC: 10.5 MG/DL (ref 8.6–10.2)
CALCIUM SERPL-MCNC: 10.9 MG/DL (ref 8.6–10.2)
CALCIUM SERPL-MCNC: 9.6 MG/DL (ref 8.6–10.2)
CHLORIDE BLD-SCNC: 104 MEQ/L (ref 98–107)
CHLORIDE BLD-SCNC: 105 MEQ/L (ref 98–107)
CHLORIDE BLD-SCNC: 107 MEQ/L (ref 98–107)
CHOLESTEROL, TOTAL: 180 MG/DL (ref 0–199)
CHOLESTEROL, TOTAL: 188 MG/DL (ref 0–199)
CLARITY: ABNORMAL
CLARITY: ABNORMAL
CO2: 22 MEQ/L (ref 22–29)
CO2: 23 MEQ/L (ref 22–29)
CO2: 24 MEQ/L (ref 22–29)
COLOR: ABNORMAL
COLOR: ABNORMAL
CREAT SERPL-MCNC: 0.74 MG/DL (ref 0.5–0.9)
CREAT SERPL-MCNC: 0.77 MG/DL (ref 0.5–0.9)
CREAT SERPL-MCNC: 0.8 MG/DL (ref 0.5–0.9)
CREATININE URINE: 134.1 MG/DL
CRYSTALS, UA: ABNORMAL
EOSINOPHILS ABSOLUTE: 0.1 K/UL (ref 0–0.7)
EOSINOPHILS ABSOLUTE: 0.2 K/UL (ref 0–0.7)
EOSINOPHILS ABSOLUTE: 0.3 K/UL (ref 0–0.7)
EOSINOPHILS RELATIVE PERCENT: 2.9 %
EOSINOPHILS RELATIVE PERCENT: 2.9 %
EOSINOPHILS RELATIVE PERCENT: 4.5 %
EPITHELIAL CELLS, UA: ABNORMAL /HPF
EPITHELIAL CELLS, UA: ABNORMAL /HPF
GFR AFRICAN AMERICAN: >60
GFR NON-AFRICAN AMERICAN: >60
GLOBULIN: 3 G/DL (ref 2.3–3.5)
GLOBULIN: 3 G/DL (ref 2.3–3.5)
GLUCOSE BLD-MCNC: 120 MG/DL (ref 60–115)
GLUCOSE BLD-MCNC: 126 MG/DL (ref 60–115)
GLUCOSE BLD-MCNC: 138 MG/DL (ref 74–109)
GLUCOSE BLD-MCNC: 143 MG/DL (ref 74–109)
GLUCOSE BLD-MCNC: 144 MG/DL (ref 74–109)
GLUCOSE BLD-MCNC: 152 MG/DL (ref 60–115)
GLUCOSE BLD-MCNC: 171 MG/DL (ref 60–115)
GLUCOSE URINE: NEGATIVE MG/DL
GLUCOSE URINE: NEGATIVE MG/DL
HBA1C MFR BLD: 6.6 % (ref 4.8–5.9)
HBA1C MFR BLD: 7 % (ref 4.8–5.9)
HCT VFR BLD CALC: 24.5 % (ref 37–47)
HCT VFR BLD CALC: 35.5 % (ref 37–47)
HCT VFR BLD CALC: 36 % (ref 37–47)
HDLC SERPL-MCNC: 50 MG/DL (ref 40–59)
HDLC SERPL-MCNC: 53 MG/DL (ref 40–59)
HEMOGLOBIN: 11.7 G/DL (ref 12–16)
HEMOGLOBIN: 12.2 G/DL (ref 12–16)
HEMOGLOBIN: 8.2 G/DL (ref 12–16)
KETONES, URINE: NEGATIVE MG/DL
KETONES, URINE: NEGATIVE MG/DL
LDL CHOLESTEROL CALCULATED: 105 MG/DL (ref 0–129)
LDL CHOLESTEROL CALCULATED: 112 MG/DL (ref 0–129)
LEUKOCYTE ESTERASE, URINE: ABNORMAL
LEUKOCYTE ESTERASE, URINE: ABNORMAL
LYMPHOCYTES ABSOLUTE: 1.1 K/UL (ref 1–4.8)
LYMPHOCYTES ABSOLUTE: 1.2 K/UL (ref 1–4.8)
LYMPHOCYTES ABSOLUTE: 1.2 K/UL (ref 1–4.8)
LYMPHOCYTES RELATIVE PERCENT: 14.7 %
LYMPHOCYTES RELATIVE PERCENT: 20.1 %
LYMPHOCYTES RELATIVE PERCENT: 22.9 %
MAGNESIUM: 2.2 MG/DL (ref 1.7–2.3)
MCH RBC QN AUTO: 28.3 PG (ref 27–31.3)
MCH RBC QN AUTO: 29.9 PG (ref 27–31.3)
MCH RBC QN AUTO: 30 PG (ref 27–31.3)
MCHC RBC AUTO-ENTMCNC: 32.9 % (ref 33–37)
MCHC RBC AUTO-ENTMCNC: 33.6 % (ref 33–37)
MCHC RBC AUTO-ENTMCNC: 33.9 % (ref 33–37)
MCV RBC AUTO: 86.1 FL (ref 82–100)
MCV RBC AUTO: 88.2 FL (ref 82–100)
MCV RBC AUTO: 89.4 FL (ref 82–100)
MICROALBUMIN UR-MCNC: <1.2 MG/DL
MICROALBUMIN/CREAT UR-RTO: NORMAL MG/G (ref 0–30)
MONOCYTES ABSOLUTE: 0.5 K/UL (ref 0.2–0.8)
MONOCYTES ABSOLUTE: 0.5 K/UL (ref 0.2–0.8)
MONOCYTES ABSOLUTE: 0.9 K/UL (ref 0.2–0.8)
MONOCYTES RELATIVE PERCENT: 13.2 %
MONOCYTES RELATIVE PERCENT: 8.5 %
MONOCYTES RELATIVE PERCENT: 9.3 %
MUCUS: PRESENT
NEUTROPHILS ABSOLUTE: 3.3 K/UL (ref 1.4–6.5)
NEUTROPHILS ABSOLUTE: 3.9 K/UL (ref 1.4–6.5)
NEUTROPHILS ABSOLUTE: 4.8 K/UL (ref 1.4–6.5)
NEUTROPHILS RELATIVE PERCENT: 64.1 %
NEUTROPHILS RELATIVE PERCENT: 67.1 %
NEUTROPHILS RELATIVE PERCENT: 67.7 %
NITRITE, URINE: NEGATIVE
NITRITE, URINE: NEGATIVE
PARATHYROID HORMONE INTACT: 103.1 PG/ML (ref 15–65)
PARATHYROID HORMONE INTACT: 113.9 PG/ML (ref 15–65)
PDW BLD-RTO: 14 % (ref 11.5–14.5)
PDW BLD-RTO: 14.3 % (ref 11.5–14.5)
PDW BLD-RTO: 15.5 % (ref 11.5–14.5)
PERFORMED ON: ABNORMAL
PH UA: 5 (ref 5–9)
PH UA: 5.5 (ref 5–9)
PLATELET # BLD: 285 K/UL (ref 130–400)
PLATELET # BLD: 442 K/UL (ref 130–400)
PLATELET # BLD: 461 K/UL (ref 130–400)
POTASSIUM SERPL-SCNC: 4.3 MEQ/L (ref 3.5–5.1)
POTASSIUM SERPL-SCNC: 4.3 MEQ/L (ref 3.5–5.1)
POTASSIUM SERPL-SCNC: 4.5 MEQ/L (ref 3.5–5.1)
PROTEIN UA: ABNORMAL MG/DL
PROTEIN UA: NEGATIVE MG/DL
RBC # BLD: 2.74 M/UL (ref 4.2–5.4)
RBC # BLD: 4.08 M/UL (ref 4.2–5.4)
RBC # BLD: 4.12 M/UL (ref 4.2–5.4)
RBC UA: ABNORMAL /HPF (ref 0–2)
RBC UA: ABNORMAL /HPF (ref 0–2)
SODIUM BLD-SCNC: 138 MEQ/L (ref 132–144)
SODIUM BLD-SCNC: 139 MEQ/L (ref 132–144)
SODIUM BLD-SCNC: 142 MEQ/L (ref 132–144)
SPECIFIC GRAVITY UA: 1.02 (ref 1–1.03)
SPECIFIC GRAVITY UA: 1.03 (ref 1–1.03)
T4 FREE: 1.27 NG/DL (ref 0.93–1.7)
T4 FREE: 1.29 NG/DL (ref 0.93–1.7)
TOTAL PROTEIN: 6.8 G/DL (ref 6.4–8.1)
TOTAL PROTEIN: 6.9 G/DL (ref 6.4–8.1)
TRIGL SERPL-MCNC: 114 MG/DL (ref 0–200)
TRIGL SERPL-MCNC: 127 MG/DL (ref 0–200)
TSH REFLEX: 4.5 UIU/ML (ref 0.27–4.2)
TSH REFLEX: 5.56 UIU/ML (ref 0.27–4.2)
URINE CULTURE, ROUTINE: NORMAL
URINE CULTURE, ROUTINE: NORMAL
UROBILINOGEN, URINE: 0.2 E.U./DL
UROBILINOGEN, URINE: 1 E.U./DL
VITAMIN D2 AND D3, TOTAL: 18.4 NG/ML (ref 30–80)
VITAMIN D2 AND D3, TOTAL: 33.6 NG/ML (ref 30–80)
VITAMIN D2, 25 HYDROXY: 25.7 NG/ML
VITAMIN D2, 25 HYDROXY: 9.3 NG/ML
VITAMIN D3,25 HYDROXY: 7.9 NG/ML
VITAMIN D3,25 HYDROXY: 9.1 NG/ML
WBC # BLD: 5.1 K/UL (ref 4.8–10.8)
WBC # BLD: 5.8 K/UL (ref 4.8–10.8)
WBC # BLD: 7.1 K/UL (ref 4.8–10.8)
WBC UA: ABNORMAL /HPF (ref 0–5)
WBC UA: ABNORMAL /HPF (ref 0–5)

## 2018-01-01 PROCEDURE — 3288F FALL RISK ASSESSMENT DOCD: CPT | Performed by: FAMILY MEDICINE

## 2018-01-01 PROCEDURE — 6370000000 HC RX 637 (ALT 250 FOR IP): Performed by: ORTHOPAEDIC SURGERY

## 2018-01-01 PROCEDURE — 97110 THERAPEUTIC EXERCISES: CPT

## 2018-01-01 PROCEDURE — 2580000003 HC RX 258: Performed by: ORTHOPAEDIC SURGERY

## 2018-01-01 PROCEDURE — 99214 OFFICE O/P EST MOD 30 MIN: CPT | Performed by: FAMILY MEDICINE

## 2018-01-01 PROCEDURE — 6370000000 HC RX 637 (ALT 250 FOR IP): Performed by: INTERNAL MEDICINE

## 2018-01-01 PROCEDURE — 85025 COMPLETE CBC W/AUTO DIFF WBC: CPT

## 2018-01-01 PROCEDURE — 1210000000 HC MED SURG R&B

## 2018-01-01 PROCEDURE — G8510 SCR DEP NEG, NO PLAN REQD: HCPCS | Performed by: FAMILY MEDICINE

## 2018-01-01 PROCEDURE — 6370000000 HC RX 637 (ALT 250 FOR IP): Performed by: PHYSICAL MEDICINE & REHABILITATION

## 2018-01-01 PROCEDURE — 97535 SELF CARE MNGMENT TRAINING: CPT

## 2018-01-01 PROCEDURE — G0008 ADMIN INFLUENZA VIRUS VAC: HCPCS | Performed by: FAMILY MEDICINE

## 2018-01-01 PROCEDURE — 90662 IIV NO PRSV INCREASED AG IM: CPT | Performed by: FAMILY MEDICINE

## 2018-01-01 PROCEDURE — 80048 BASIC METABOLIC PNL TOTAL CA: CPT

## 2018-01-01 PROCEDURE — 36415 COLL VENOUS BLD VENIPUNCTURE: CPT

## 2018-01-01 PROCEDURE — 2700000000 HC OXYGEN THERAPY PER DAY

## 2018-01-01 PROCEDURE — 6370000000 HC RX 637 (ALT 250 FOR IP): Performed by: EMERGENCY MEDICINE

## 2018-01-01 PROCEDURE — 6360000002 HC RX W HCPCS: Performed by: ORTHOPAEDIC SURGERY

## 2018-01-01 RX ORDER — LEVOTHYROXINE SODIUM 0.03 MG/1
25 TABLET ORAL DAILY
Qty: 30 TABLET | Refills: 5 | Status: SHIPPED | OUTPATIENT
Start: 2018-01-01 | End: 2018-01-01 | Stop reason: SDUPTHER

## 2018-01-01 RX ORDER — LEVOTHYROXINE SODIUM 0.05 MG/1
50 TABLET ORAL DAILY
Qty: 30 TABLET | Refills: 5 | Status: SHIPPED | OUTPATIENT
Start: 2018-01-01

## 2018-01-01 RX ORDER — ERGOCALCIFEROL (VITAMIN D2) 1250 MCG
50000 CAPSULE ORAL WEEKLY
Qty: 12 CAPSULE | Refills: 0 | Status: SHIPPED | OUTPATIENT
Start: 2018-01-01 | End: 2019-01-01

## 2018-01-01 RX ORDER — PROMETHAZINE HYDROCHLORIDE AND CODEINE PHOSPHATE 6.25; 1 MG/5ML; MG/5ML
5 SYRUP ORAL 4 TIMES DAILY PRN
Qty: 240 ML | Refills: 0 | Status: SHIPPED | OUTPATIENT
Start: 2018-01-01 | End: 2019-01-01 | Stop reason: SDUPTHER

## 2018-01-01 RX ADMIN — SODIUM CHLORIDE, PRESERVATIVE FREE 10 ML: 5 INJECTION INTRAVENOUS at 20:57

## 2018-01-01 RX ADMIN — OXYCODONE HYDROCHLORIDE AND ACETAMINOPHEN 1 TABLET: 5; 325 TABLET ORAL at 09:12

## 2018-01-01 RX ADMIN — OXYCODONE HYDROCHLORIDE AND ACETAMINOPHEN 1 TABLET: 5; 325 TABLET ORAL at 16:32

## 2018-01-01 RX ADMIN — SENNOSIDES AND DOCUSATE SODIUM 1 TABLET: 8.6; 5 TABLET ORAL at 09:12

## 2018-01-01 RX ADMIN — DOCUSATE SODIUM 100 MG: 100 CAPSULE, LIQUID FILLED ORAL at 09:12

## 2018-01-01 RX ADMIN — OXYCODONE HYDROCHLORIDE AND ACETAMINOPHEN 1 TABLET: 5; 325 TABLET ORAL at 00:06

## 2018-01-01 RX ADMIN — DOCUSATE SODIUM 100 MG: 100 CAPSULE, LIQUID FILLED ORAL at 20:57

## 2018-01-01 RX ADMIN — ATORVASTATIN CALCIUM 10 MG: 10 TABLET, FILM COATED ORAL at 20:57

## 2018-01-01 RX ADMIN — ENOXAPARIN SODIUM 40 MG: 40 INJECTION, SOLUTION INTRAVENOUS; SUBCUTANEOUS at 09:12

## 2018-01-01 RX ADMIN — SODIUM CHLORIDE, PRESERVATIVE FREE 10 ML: 5 INJECTION INTRAVENOUS at 09:14

## 2018-01-01 ASSESSMENT — ENCOUNTER SYMPTOMS
RESPIRATORY NEGATIVE: 1
ALLERGIC/IMMUNOLOGIC NEGATIVE: 1
SHORTNESS OF BREATH: 0
RESPIRATORY NEGATIVE: 1
GASTROINTESTINAL NEGATIVE: 1
EYES NEGATIVE: 1
SHORTNESS OF BREATH: 0
BLURRED VISION: 0
ALLERGIC/IMMUNOLOGIC NEGATIVE: 1
GASTROINTESTINAL NEGATIVE: 1
EYES NEGATIVE: 1
ORTHOPNEA: 0

## 2018-01-01 ASSESSMENT — PAIN SCALES - GENERAL
PAINLEVEL_OUTOF10: 4
PAINLEVEL_OUTOF10: 4
PAINLEVEL_OUTOF10: 3
PAINLEVEL_OUTOF10: 5

## 2018-01-01 ASSESSMENT — PATIENT HEALTH QUESTIONNAIRE - PHQ9
SUM OF ALL RESPONSES TO PHQ9 QUESTIONS 1 & 2: 0
1. LITTLE INTEREST OR PLEASURE IN DOING THINGS: 0
SUM OF ALL RESPONSES TO PHQ QUESTIONS 1-9: 0
SUM OF ALL RESPONSES TO PHQ QUESTIONS 1-9: 0
2. FEELING DOWN, DEPRESSED OR HOPELESS: 0

## 2018-01-01 ASSESSMENT — PAIN DESCRIPTION - PAIN TYPE: TYPE: ACUTE PAIN;SURGICAL PAIN

## 2018-01-01 ASSESSMENT — PAIN DESCRIPTION - ORIENTATION: ORIENTATION: RIGHT

## 2018-01-01 ASSESSMENT — PAIN DESCRIPTION - LOCATION: LOCATION: HIP

## 2018-01-01 ASSESSMENT — PAIN DESCRIPTION - DESCRIPTORS: DESCRIPTORS: ACHING

## 2018-01-02 ENCOUNTER — HOSPITAL ENCOUNTER (INPATIENT)
Age: 83
LOS: 21 days | Discharge: HOME HEALTH | DRG: 560 | End: 2018-01-23
Attending: PHYSICAL MEDICINE & REHABILITATION | Admitting: PHYSICAL MEDICINE & REHABILITATION
Payer: MEDICARE

## 2018-01-02 VITALS
DIASTOLIC BLOOD PRESSURE: 56 MMHG | TEMPERATURE: 97.9 F | HEIGHT: 67 IN | RESPIRATION RATE: 20 BRPM | WEIGHT: 211.42 LBS | BODY MASS INDEX: 33.18 KG/M2 | OXYGEN SATURATION: 100 % | SYSTOLIC BLOOD PRESSURE: 108 MMHG | HEART RATE: 71 BPM

## 2018-01-02 PROBLEM — Z96.641 H/O TOTAL HIP ARTHROPLASTY, RIGHT: Status: ACTIVE | Noted: 2017-12-30

## 2018-01-02 PROBLEM — S72.001B: Status: ACTIVE | Noted: 2018-01-02

## 2018-01-02 LAB
ANION GAP SERPL CALCULATED.3IONS-SCNC: 12 MEQ/L (ref 7–13)
BASOPHILS ABSOLUTE: 0 K/UL (ref 0–0.2)
BASOPHILS RELATIVE PERCENT: 0.5 %
BLOOD BANK DISPENSE STATUS: NORMAL
BLOOD BANK PRODUCT CODE: NORMAL
BPU ID: NORMAL
BUN BLDV-MCNC: 21 MG/DL (ref 8–23)
CALCIUM SERPL-MCNC: 9.6 MG/DL (ref 8.6–10.2)
CHLORIDE BLD-SCNC: 105 MEQ/L (ref 98–107)
CO2: 23 MEQ/L (ref 22–29)
CREAT SERPL-MCNC: 0.72 MG/DL (ref 0.5–0.9)
DESCRIPTION BLOOD BANK: NORMAL
EOSINOPHILS ABSOLUTE: 0.3 K/UL (ref 0–0.7)
EOSINOPHILS RELATIVE PERCENT: 3.9 %
GFR AFRICAN AMERICAN: >60
GFR NON-AFRICAN AMERICAN: >60
GLUCOSE BLD-MCNC: 132 MG/DL (ref 74–109)
GLUCOSE BLD-MCNC: 166 MG/DL (ref 60–115)
HCT VFR BLD CALC: 25.6 % (ref 37–47)
HEMOGLOBIN: 8.6 G/DL (ref 12–16)
LYMPHOCYTES ABSOLUTE: 1.1 K/UL (ref 1–4.8)
LYMPHOCYTES RELATIVE PERCENT: 14.8 %
MCH RBC QN AUTO: 30.3 PG (ref 27–31.3)
MCHC RBC AUTO-ENTMCNC: 33.5 % (ref 33–37)
MCV RBC AUTO: 90.4 FL (ref 82–100)
MONOCYTES ABSOLUTE: 1 K/UL (ref 0.2–0.8)
MONOCYTES RELATIVE PERCENT: 13.4 %
NEUTROPHILS ABSOLUTE: 4.9 K/UL (ref 1.4–6.5)
NEUTROPHILS RELATIVE PERCENT: 67.4 %
PDW BLD-RTO: 14.4 % (ref 11.5–14.5)
PERFORMED ON: ABNORMAL
PLATELET # BLD: 310 K/UL (ref 130–400)
POTASSIUM SERPL-SCNC: 4.6 MEQ/L (ref 3.5–5.1)
RBC # BLD: 2.83 M/UL (ref 4.2–5.4)
SODIUM BLD-SCNC: 140 MEQ/L (ref 132–144)
WBC # BLD: 7.3 K/UL (ref 4.8–10.8)

## 2018-01-02 PROCEDURE — 6370000000 HC RX 637 (ALT 250 FOR IP): Performed by: ORTHOPAEDIC SURGERY

## 2018-01-02 PROCEDURE — 97535 SELF CARE MNGMENT TRAINING: CPT

## 2018-01-02 PROCEDURE — G8987 SELF CARE CURRENT STATUS: HCPCS

## 2018-01-02 PROCEDURE — 6370000000 HC RX 637 (ALT 250 FOR IP): Performed by: NURSE PRACTITIONER

## 2018-01-02 PROCEDURE — 80048 BASIC METABOLIC PNL TOTAL CA: CPT

## 2018-01-02 PROCEDURE — 2700000000 HC OXYGEN THERAPY PER DAY

## 2018-01-02 PROCEDURE — 6370000000 HC RX 637 (ALT 250 FOR IP): Performed by: EMERGENCY MEDICINE

## 2018-01-02 PROCEDURE — 1180000000 HC REHAB R&B

## 2018-01-02 PROCEDURE — 97110 THERAPEUTIC EXERCISES: CPT

## 2018-01-02 PROCEDURE — 6360000002 HC RX W HCPCS: Performed by: ORTHOPAEDIC SURGERY

## 2018-01-02 PROCEDURE — 85025 COMPLETE CBC W/AUTO DIFF WBC: CPT

## 2018-01-02 PROCEDURE — 36415 COLL VENOUS BLD VENIPUNCTURE: CPT

## 2018-01-02 PROCEDURE — 97116 GAIT TRAINING THERAPY: CPT

## 2018-01-02 PROCEDURE — G8988 SELF CARE GOAL STATUS: HCPCS

## 2018-01-02 PROCEDURE — 6370000000 HC RX 637 (ALT 250 FOR IP): Performed by: PHYSICAL MEDICINE & REHABILITATION

## 2018-01-02 PROCEDURE — 2580000003 HC RX 258: Performed by: ORTHOPAEDIC SURGERY

## 2018-01-02 PROCEDURE — 97167 OT EVAL HIGH COMPLEX 60 MIN: CPT

## 2018-01-02 PROCEDURE — 93010 ELECTROCARDIOGRAM REPORT: CPT | Performed by: INTERNAL MEDICINE

## 2018-01-02 RX ORDER — DEXTROSE MONOHYDRATE 25 G/50ML
12.5 INJECTION, SOLUTION INTRAVENOUS PRN
Status: DISCONTINUED | OUTPATIENT
Start: 2018-01-02 | End: 2018-01-15

## 2018-01-02 RX ORDER — ATORVASTATIN CALCIUM 10 MG/1
10 TABLET, FILM COATED ORAL DAILY
Status: DISCONTINUED | OUTPATIENT
Start: 2018-01-02 | End: 2018-01-23 | Stop reason: HOSPADM

## 2018-01-02 RX ORDER — CINNAMON
1 OIL (ML) MISCELLANEOUS 4 TIMES DAILY
Status: DISCONTINUED | OUTPATIENT
Start: 2018-01-02 | End: 2018-01-15

## 2018-01-02 RX ORDER — OXYCODONE HYDROCHLORIDE AND ACETAMINOPHEN 5; 325 MG/1; MG/1
2 TABLET ORAL EVERY 4 HOURS PRN
Status: DISCONTINUED | OUTPATIENT
Start: 2018-01-02 | End: 2018-01-23 | Stop reason: HOSPADM

## 2018-01-02 RX ORDER — CALCIUM CARBONATE 200(500)MG
500 TABLET,CHEWABLE ORAL 3 TIMES DAILY PRN
Status: CANCELLED | OUTPATIENT
Start: 2018-01-02

## 2018-01-02 RX ORDER — LIDOCAINE 50 MG/G
3 PATCH TOPICAL DAILY
Status: CANCELLED | OUTPATIENT
Start: 2018-01-02

## 2018-01-02 RX ORDER — ACETAMINOPHEN 325 MG/1
650 TABLET ORAL EVERY 4 HOURS PRN
Qty: 60 TABLET | Refills: 0 | Status: SHIPPED | OUTPATIENT
Start: 2018-01-02

## 2018-01-02 RX ORDER — DOCUSATE SODIUM 100 MG/1
100 CAPSULE, LIQUID FILLED ORAL 2 TIMES DAILY
Status: CANCELLED | OUTPATIENT
Start: 2018-01-02

## 2018-01-02 RX ORDER — DEXTROSE MONOHYDRATE 50 MG/ML
100 INJECTION, SOLUTION INTRAVENOUS PRN
Status: CANCELLED | OUTPATIENT
Start: 2018-01-02

## 2018-01-02 RX ORDER — ACETAMINOPHEN 325 MG/1
650 TABLET ORAL EVERY 4 HOURS PRN
Status: CANCELLED | OUTPATIENT
Start: 2018-01-02

## 2018-01-02 RX ORDER — DEXTROSE MONOHYDRATE 50 MG/ML
100 INJECTION, SOLUTION INTRAVENOUS PRN
Status: DISCONTINUED | OUTPATIENT
Start: 2018-01-02 | End: 2018-01-23 | Stop reason: HOSPADM

## 2018-01-02 RX ORDER — OXYCODONE HYDROCHLORIDE AND ACETAMINOPHEN 5; 325 MG/1; MG/1
1 TABLET ORAL EVERY 4 HOURS PRN
Status: CANCELLED | OUTPATIENT
Start: 2018-01-02

## 2018-01-02 RX ORDER — CALCIUM CARBONATE 200(500)MG
500 TABLET,CHEWABLE ORAL 3 TIMES DAILY PRN
Status: DISCONTINUED | OUTPATIENT
Start: 2018-01-02 | End: 2018-01-15

## 2018-01-02 RX ORDER — OXYCODONE HYDROCHLORIDE AND ACETAMINOPHEN 5; 325 MG/1; MG/1
1 TABLET ORAL EVERY 4 HOURS PRN
Status: DISCONTINUED | OUTPATIENT
Start: 2018-01-02 | End: 2018-01-23 | Stop reason: HOSPADM

## 2018-01-02 RX ORDER — DOCUSATE SODIUM 100 MG/1
100 CAPSULE, LIQUID FILLED ORAL 2 TIMES DAILY
Status: DISCONTINUED | OUTPATIENT
Start: 2018-01-02 | End: 2018-01-15

## 2018-01-02 RX ORDER — ACETAMINOPHEN 325 MG/1
650 TABLET ORAL EVERY 4 HOURS PRN
Status: DISCONTINUED | OUTPATIENT
Start: 2018-01-02 | End: 2018-01-23 | Stop reason: HOSPADM

## 2018-01-02 RX ORDER — NICOTINE POLACRILEX 4 MG
15 LOZENGE BUCCAL PRN
Status: CANCELLED | OUTPATIENT
Start: 2018-01-02

## 2018-01-02 RX ORDER — ATORVASTATIN CALCIUM 10 MG/1
10 TABLET, FILM COATED ORAL DAILY
Status: CANCELLED | OUTPATIENT
Start: 2018-01-02

## 2018-01-02 RX ORDER — NICOTINE POLACRILEX 4 MG
15 LOZENGE BUCCAL PRN
Status: DISCONTINUED | OUTPATIENT
Start: 2018-01-02 | End: 2018-01-07

## 2018-01-02 RX ORDER — OXYCODONE HYDROCHLORIDE AND ACETAMINOPHEN 5; 325 MG/1; MG/1
2 TABLET ORAL EVERY 4 HOURS PRN
Status: CANCELLED | OUTPATIENT
Start: 2018-01-02

## 2018-01-02 RX ORDER — CINNAMON
1 OIL (ML) MISCELLANEOUS 4 TIMES DAILY
Status: CANCELLED | OUTPATIENT
Start: 2018-01-02

## 2018-01-02 RX ORDER — LIDOCAINE 50 MG/G
3 PATCH TOPICAL DAILY
Status: DISCONTINUED | OUTPATIENT
Start: 2018-01-03 | End: 2018-01-03

## 2018-01-02 RX ORDER — DEXTROSE MONOHYDRATE 25 G/50ML
12.5 INJECTION, SOLUTION INTRAVENOUS PRN
Status: CANCELLED | OUTPATIENT
Start: 2018-01-02

## 2018-01-02 RX ADMIN — DOCUSATE SODIUM 100 MG: 100 CAPSULE, LIQUID FILLED ORAL at 19:57

## 2018-01-02 RX ADMIN — SENNOSIDES AND DOCUSATE SODIUM 1 TABLET: 8.6; 5 TABLET ORAL at 08:46

## 2018-01-02 RX ADMIN — OXYCODONE HYDROCHLORIDE AND ACETAMINOPHEN 1 TABLET: 5; 325 TABLET ORAL at 05:24

## 2018-01-02 RX ADMIN — ACETAMINOPHEN 650 MG: 325 TABLET, FILM COATED ORAL at 19:56

## 2018-01-02 RX ADMIN — Medication 1 DROP: at 19:57

## 2018-01-02 RX ADMIN — DOCUSATE SODIUM 100 MG: 100 CAPSULE, LIQUID FILLED ORAL at 08:48

## 2018-01-02 RX ADMIN — ENOXAPARIN SODIUM 40 MG: 40 INJECTION, SOLUTION INTRAVENOUS; SUBCUTANEOUS at 08:47

## 2018-01-02 RX ADMIN — OXYCODONE HYDROCHLORIDE AND ACETAMINOPHEN 1 TABLET: 5; 325 TABLET ORAL at 00:00

## 2018-01-02 RX ADMIN — OXYCODONE HYDROCHLORIDE AND ACETAMINOPHEN 1 TABLET: 5; 325 TABLET ORAL at 13:09

## 2018-01-02 RX ADMIN — SODIUM CHLORIDE, PRESERVATIVE FREE 10 ML: 5 INJECTION INTRAVENOUS at 08:49

## 2018-01-02 RX ADMIN — ATORVASTATIN CALCIUM 10 MG: 10 TABLET, FILM COATED ORAL at 19:56

## 2018-01-02 ASSESSMENT — PAIN DESCRIPTION - DESCRIPTORS: DESCRIPTORS: ACHING;CONSTANT

## 2018-01-02 ASSESSMENT — PAIN SCALES - GENERAL
PAINLEVEL_OUTOF10: 0
PAINLEVEL_OUTOF10: 4
PAINLEVEL_OUTOF10: 0
PAINLEVEL_OUTOF10: 2

## 2018-01-02 ASSESSMENT — PAIN DESCRIPTION - ONSET: ONSET: ON-GOING

## 2018-01-02 ASSESSMENT — PAIN DESCRIPTION - ORIENTATION: ORIENTATION: RIGHT

## 2018-01-02 ASSESSMENT — PAIN DESCRIPTION - PAIN TYPE: TYPE: SURGICAL PAIN;ACUTE PAIN

## 2018-01-02 ASSESSMENT — PAIN DESCRIPTION - LOCATION: LOCATION: HIP

## 2018-01-02 ASSESSMENT — PAIN DESCRIPTION - PROGRESSION: CLINICAL_PROGRESSION: GRADUALLY IMPROVING

## 2018-01-02 ASSESSMENT — PAIN DESCRIPTION - FREQUENCY: FREQUENCY: CONTINUOUS

## 2018-01-02 NOTE — PROGRESS NOTES
[facility] OCCUPATIONAL THERAPY EVALUATION - ACUTE   Room: N227/N227-01  Date: 2018  Patient Name: Iza Patricio        MRN: [id number]  Account: [de-identified]   : 1935  (80 y.o.)    Chart Review:  Diagnosis:  The primary encounter diagnosis was Closed fracture of right hip, initial encounter ([facility] [address] A diagnosis of Contusion of right wrist, initial encounter was also pertinent to this visit. Past Medical History:   Diagnosis Date    Chronic kidney disease     CVA (cerebral vascular accident) ([facility] [address] 2016    Panic attacks     Type 2 diabetes mellitus without complication ([facility] [address]     Urinary incontinence      Past Surgical History:   Procedure Laterality Date    APPENDECTOMY      COLONOSCOPY      EYE SURGERY      FRACTURE SURGERY      L wrist    UPPER GASTROINTESTINAL ENDOSCOPY  03       Restrictions/Precautions: Fall Risk, Weight Bearing  Hip Precautions: Posterior hip precautions (reviewed pt knew 2/3)  Right Lower Extremity Weight Bearing: Weight Bearing As Tolerated  Right Upper Extremity Weight Bearing: Non Weight Bearing  Wit platform walker  Evaluation and Pt. rights have been reviewed: [x]Yes   [] No   If no why not:   Falls safety interventions in place  [x]Yes   [] No    Comments:     Subjective: \"I fell at a restaurant\"    Prior living arrangement:    Support contact: family  Pt lives: [x] Alone   [] With spouse   [] Other   Comment:    Home: [x] Single level   []  Two level   []  Split level     []  Apartment:     Entrance:  Stairs: 0 Hand rails 0,    Inside: Stairs: 0 Hand rails: 0  Bathroom: [] Bath tub   [x] Tub/Shower combo   []  Shower stall    Location: main level    DME: [] W/W   [] Judi Mishra   [] Rollator   []  W/C   [x] Julie Nissen   [] Shower Chair   [] [facility]  [] Dressing  AE  [] Other:      Previous Functional Status:   Ind with ADLs, IADLs and amb    Pain:   Start of session: not rated/10  Location: R UE, and LE  Description: hurts  Action: [] No Action Necessary    [x]
Physical Therapy  Facility/Department: 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    History of cataract extraction 2016    Blepharitis of right eye 2016    Punctate keratitis 2016    Hypercholesteremia 2016    Essential hypertension 2016    Seborrheic keratosis 2014    Varicose veins 10/31/2013    Type 2 diabetes mellitus ([facility] [address] 10/31/2013    Venous stasis dermatitis 10/31/2013    Panic attacks        Precautions/Weight Bearing Restrictions: Weight bearing as tolerated RLE, NWB RUE pending x-ray results  Restrictions/Precautions: Weight Bearing (Rt post hip precautions)  Falls Safety Armband Present:  [x] Yes  [] No    SUBJECTIVE: Pt in bed upon arrival with friend present. Pt unaware of hip precautions.    Pain:   Initial Pain level: none  Action:  []  Pt declined intervention  [] Nursing notified     [x] Pain acceptable level for treatment  [] Other:      Reassessment of Pain: 2 /10   Location: R hip    Description: aching  Action:  []  Pt declined intervention  [] Nursing notified     [x] Pain acceptable level for treatment  [] Other:    OBJECTIVE:     Bed Mobility:   Rolling: - Not Tested  Supine to Sit: -  Maximal Assist  +2  Sit to Supine: -  Maximal Assist  +2  Scooting towards HOB: - Maximal Assist    Transfers:   Sit to Stand: - Maximal Assist  +2  Stand to Sit: - Minimal Assist    Exercise:  Supine:  AP, QS, GS
Physician Discharge Summary     Patient ID:  Chelsi Boykin  [id number]  58 y.o.  1935    Admit date: 12/29/2017    Discharge date and time: No discharge date for patient encounter. Admitting Physician: Ammy Beatty MD     Discharge Physician: Cory Das MD    Admission Diagnoses: Fracture of hip, closed, right, initial encounter [facility]) Yaima Ann    Discharge Diagnoses: right femoral neck fracture    Admission Condition: poor    Discharged Condition: fair    Indication for Admission: femoral neck fracture    Hospital Course: 80year old female who is s/p ORIF of left hip fracture on 12/30/17    Consults: orthopedic surgery    Significant Diagnostic Studies: labs:     Treatments: IV hydration and surgery:     Discharge Exam:  BP (!) 108/56   Pulse 71   Temp 97.9 °F (36.6 °C) (Oral)   Resp 20   Ht 5' 7\" (1.702 m)   Wt 211 lb 6.7 oz (95.9 kg)   SpO2 100%   BMI 33.11 kg/m²   General appearance: alert, appears stated age and cooperative  Neck: no adenopathy, no carotid bruit, no JVD, supple, symmetrical, trachea midline and thyroid not enlarged, symmetric, no tenderness/mass/nodules  Back: symmetric, no curvature. ROM normal. No CVA tenderness. Lungs: clear to auscultation bilaterally  Heart: regular rate and rhythm, S1, S2 normal, no murmur, click, rub or gallop  Abdomen: soft, non-tender; bowel sounds normal; no masses,  no organomegaly  Extremities: extremities normal, atraumatic, no cyanosis or edema  Pulses: 2+ and symmetric  Skin: Skin color, texture, turgor normal. No rashes or lesions  Neurologic: Grossly normal    [facility]    Patient Instructions:   [unfilled]  Activity: activity as tolerated  Diet: diabetic diet  Wound Care: none needed    Follow-up with Dr. Marcin Givens in  .     Signed:  Cory Das  1/2/2018  3:48 PM
[facility] Occupational Therapy      Date: 2017  Patient Name: Lindsey Joseph        MRN: [id number]  Account: [de-identified]   : 1935  (80 y.o.)  Room: [place] [address]    Chart reviewed, attempted OT eval at 1:52 PM, but pt. unavailable 2° to:    [] Hold per nsg request    [] Pt declined     [x] Pt. . off floor for test/procedure. Will attempt again when able.     Electronically signed by YEIMY Cline on 2017 at 1:52 PM
conversational dyspnea noted. HEENT: Moist buccal mucosa, trachea midline. Anicteric sclera. Chest: No chest wall tenderness.  No use of accessory muscles  Lungs: CTAB   Heart:  S1, S2 normal, RRR, no MRG appreciated  Abdomen: (+) BS, soft, non-tender; non distended, no guarding or rigidity noted  Extremities:  no cyanosis, No edema bilat lower exts      Medications:      dextrose      sodium chloride 50 mL/hr at 12/30/17 2144    sodium chloride 50 mL/hr at 12/29/17 2352      insulin lispro  0-6 Units Subcutaneous TID WC    insulin lispro  0-3 Units Subcutaneous Nightly    lidocaine  3 patch Transdermal Daily    cinnamon oil  1 drop Oral 4x Daily    atorvastatin  10 mg Oral Daily    sodium chloride flush  10 mL Intravenous 2 times per day    docusate sodium  100 mg Oral BID    sennosides-docusate sodium  1 tablet Oral Daily    enoxaparin  40 mg Subcutaneous Daily    sodium chloride flush  3 mL Intravenous Q8H    influenza virus vaccine  0.5 mL Intramuscular Prior to discharge       LABS Reviewed    IMAGING Reviewed    Marylou Mcgowan MD  [facility] Hospitalist
pain but better than she expected  Pain Screening  Patient Currently in Pain: No    Prior Level of Function:  Social/Functional History  Lives With: Alone  Type of Home: House  Home Layout: One level  Home Access: Stairs to enter with rails  Entrance Stairs - Number of Steps: 2  Home Equipment: Rolling walker, Wheelchair-manual  ADL Assistance: Independent  Homemaking Assistance: Independent  Ambulation Assistance: Independent  Transfer Assistance: Independent  Active : Yes    OBJECTIVE:   Vision/Hearing:  Vision: Within Functional Limits  Hearing: Within functional limits    Cognition:  Overall Orientation Status: Within Normal Limits  Follows Commands: Within Functional Limits    ROM:  RLE General AROM: Limited by pain    Strength:  Strength RLE  Comment: Decreased grossly throughout  Strength LLE  Strength LLE: WFL    Neuro:  Balance  Sitting - Static: Good  Sitting - Dynamic: Fair;+  Standing - Static: Fair  Standing - Dynamic: Fair  Comments: Able to stand at [employer] for ~20 sec    Bed mobility  Supine to Sit: Maximum assistance (of 2)  Sit to Supine: Maximum assistance (of 2)    Transfers  Sit to Stand: Maximum Assistance  Stand to sit: Minimal Assistance    Ambulation  Ambulation?: No    Activity Tolerance  Activity Tolerance: Patient limited by pain    ASSESSMENT:   Body structures, Functions, Activity limitations: Decreased functional mobility ; Decreased ROM; Decreased strength;Decreased balance  Decision Making: High Complexity  History: high  Exam: high  Clinical Presentation: high    Prognosis: Good    DISCHARGE RECOMMENDATIONS:  Discharge Recommendations: Continue to assess pending progress    Assessment: Pt presents with limited mobility s/p hip fracture and Rt ANN. Pt very hesitant with all bed mobility and transfers due to fear of pain and falling. Able to stand with A of 2 people after max encouragement. PT with decreased Rt UE use due to pain in wrist- unable to WB during transfers.   Would
safety    Gait/Ambulation:   Assistive Device: Rolling Walker (platform)  Assist Level: Maximal Assist  Distance: 3-4 steps  Surface: linoleum/hardwood  Gait Deviations: small steps to chair, vc's for bracing on [employer] and mgmt of [employer], increased fatigue; chair brought up to pt d/t fatigue    Exercise:  Seated  AP/LAQ/GS x 20  Educated on anti-embolics    Neuromuscular Re-Education/Balance:  Static standing @ [employer] ~2 mins - increased fatigue    Plan   Plan  Times per day: Twice a day  Current Treatment Recommendations: Strengthening, ROM, Balance Training, Functional Mobility Training, Transfer Training, Gait Training, Stair training, Neuromuscular Re-education, Manual Therapy - Soft Tissue Mobilization, Home Exercise Program, Safety Education & Training, Patient/Caregiver Education & Training, Equipment Evaluation, Education, & procurement, Modalities  Plan Comment: cont current POC      Goals  Short term goals  Short term goal 1: Pt will require SBA for all bed mobility. Short term goal 2: Pt will perform all transfers with SBA  Short term goal 3: Pt with demonstrate good sitting and standing balance with UE support.   Short term goal 4: amb >25ft with 2ww and CGA     Comments: Pt up in chair with call button within reach and chair alarm activated      Therapy Time   Individual   Time In 0909   Time Out 0947   Minutes 38     bm - 10 mins  Trsf - 5 mins  NMR - 5 mins  Gait - 10 mins  Therex 8 mins    Electronically signed by Lary Radford PTA on 1/2/2018 at 9:47 AM
Strengthening, ROM, Balance Training, Functional Mobility Training, Transfer Training, Gait Training, Stair training, Neuromuscular Re-education, Manual Therapy - Soft Tissue Mobilization, Home Exercise Program, Safety Education & Training, Patient/Caregiver Education & Training, Equipment Evaluation, Education, & procurement, Modalities  Plan Comment: cont current POC  Safety Devices  Type of devices:  All fall risk precautions in place, Bed alarm in place, Call light within reach, Left in bed, Nurse notified     Therapy Time   Individual   Time In 0830   Time Out 0900   Minutes 30       Trsfs [address]  [address], 01/01/18 at 9:05 AM
[D64.9] 12/31/2017    History of TIA (transient ischemic attack) [Z86.73] 12/31/2017    Fracture of hip, closed, right, initial encounter ([facility] Kailey Kellogg 12/29/2017    Vitamin D deficiency [E55.9] 10/24/2017    Closed fracture of distal end of left radius [S52.502A] 07/19/2017    Hypercholesteremia [E78.00] 01/08/2016    Essential hypertension [I10] 01/08/2016    Type 2 diabetes mellitus ([facility] [E11.9] 10/31/2013     Additional work up or/and treatment plan may be added today or then after based on clinical progression. I am managing a portion of pt care. Some medical issues are handled by other specialists. Additional work up and treatment should be done in out pt setting by pt PCP and other out pt providers. In addition to examining and evaluating pt, I spent additional time explaining care, normal and abnormal findings, and treatment plan. All of pt questions were answered. Counseling, diet and education were  provided. Case will be discussed with nursing staff when appropriate. Family will be updated if and when appropriate.       Diet: DIET CARB CONTROL;    Code Status: Full Code    Rehab     Electronically signed by Elder Hutton MD on 12/31/2017 at 11:08 AM

## 2018-01-03 LAB
GLUCOSE BLD-MCNC: 133 MG/DL (ref 60–115)
PERFORMED ON: ABNORMAL

## 2018-01-03 PROCEDURE — 97110 THERAPEUTIC EXERCISES: CPT

## 2018-01-03 PROCEDURE — 6370000000 HC RX 637 (ALT 250 FOR IP): Performed by: NURSE PRACTITIONER

## 2018-01-03 PROCEDURE — 97116 GAIT TRAINING THERAPY: CPT

## 2018-01-03 PROCEDURE — 6360000002 HC RX W HCPCS: Performed by: PHYSICAL MEDICINE & REHABILITATION

## 2018-01-03 PROCEDURE — 97167 OT EVAL HIGH COMPLEX 60 MIN: CPT

## 2018-01-03 PROCEDURE — 6360000002 HC RX W HCPCS: Performed by: NURSE PRACTITIONER

## 2018-01-03 PROCEDURE — 6370000000 HC RX 637 (ALT 250 FOR IP): Performed by: PHYSICAL MEDICINE & REHABILITATION

## 2018-01-03 PROCEDURE — 1180000000 HC REHAB R&B

## 2018-01-03 PROCEDURE — 97163 PT EVAL HIGH COMPLEX 45 MIN: CPT

## 2018-01-03 PROCEDURE — 97535 SELF CARE MNGMENT TRAINING: CPT

## 2018-01-03 PROCEDURE — 99222 1ST HOSP IP/OBS MODERATE 55: CPT | Performed by: PHYSICAL MEDICINE & REHABILITATION

## 2018-01-03 RX ORDER — ERGOCALCIFEROL 1.25 MG/1
50000 CAPSULE ORAL DAILY
Status: COMPLETED | OUTPATIENT
Start: 2018-01-03 | End: 2018-01-05

## 2018-01-03 RX ORDER — UBIDECARENONE 100 MG
100 CAPSULE ORAL 2 TIMES DAILY
Status: DISCONTINUED | OUTPATIENT
Start: 2018-01-03 | End: 2018-01-15

## 2018-01-03 RX ORDER — LIDOCAINE 50 MG/G
3 PATCH TOPICAL DAILY
Status: DISCONTINUED | OUTPATIENT
Start: 2018-01-03 | End: 2018-01-23 | Stop reason: HOSPADM

## 2018-01-03 RX ORDER — CYANOCOBALAMIN 1000 UG/ML
1000 INJECTION INTRAMUSCULAR; SUBCUTANEOUS WEEKLY
Status: DISCONTINUED | OUTPATIENT
Start: 2018-01-03 | End: 2018-01-15

## 2018-01-03 RX ADMIN — ERGOCALCIFEROL 50000 UNITS: 1.25 CAPSULE ORAL at 08:32

## 2018-01-03 RX ADMIN — ATORVASTATIN CALCIUM 10 MG: 10 TABLET, FILM COATED ORAL at 08:33

## 2018-01-03 RX ADMIN — Medication 100 MG: at 12:33

## 2018-01-03 RX ADMIN — OXYCODONE HYDROCHLORIDE AND ACETAMINOPHEN 1 TABLET: 5; 325 TABLET ORAL at 08:20

## 2018-01-03 RX ADMIN — Medication 1 DROP: at 08:33

## 2018-01-03 RX ADMIN — Medication 1 DROP: at 12:34

## 2018-01-03 RX ADMIN — CYANOCOBALAMIN 1000 MCG: 1000 INJECTION, SOLUTION INTRAMUSCULAR at 08:30

## 2018-01-03 RX ADMIN — DOCUSATE SODIUM 100 MG: 100 CAPSULE, LIQUID FILLED ORAL at 19:28

## 2018-01-03 RX ADMIN — DOCUSATE SODIUM 100 MG: 100 CAPSULE, LIQUID FILLED ORAL at 08:20

## 2018-01-03 RX ADMIN — OXYCODONE HYDROCHLORIDE AND ACETAMINOPHEN 1 TABLET: 5; 325 TABLET ORAL at 12:34

## 2018-01-03 RX ADMIN — Medication 100 MG: at 08:31

## 2018-01-03 RX ADMIN — ENOXAPARIN SODIUM 40 MG: 40 INJECTION, SOLUTION INTRAVENOUS; SUBCUTANEOUS at 08:20

## 2018-01-03 ASSESSMENT — ENCOUNTER SYMPTOMS
COLOR CHANGE: 0
PHOTOPHOBIA: 0
SHORTNESS OF BREATH: 0
TROUBLE SWALLOWING: 0
CHOKING: 0
ABDOMINAL PAIN: 0
EYE REDNESS: 0
WHEEZING: 0
CHEST TIGHTNESS: 0
EYE PAIN: 0
NAUSEA: 0
FACIAL SWELLING: 0
VOMITING: 0
ANAL BLEEDING: 0
BACK PAIN: 1
COUGH: 0
ABDOMINAL DISTENTION: 0
BLOOD IN STOOL: 0
CONSTIPATION: 1

## 2018-01-03 ASSESSMENT — PAIN DESCRIPTION - PAIN TYPE: TYPE: ACUTE PAIN;SURGICAL PAIN

## 2018-01-03 ASSESSMENT — PAIN SCALES - GENERAL
PAINLEVEL_OUTOF10: 3
PAINLEVEL_OUTOF10: 2
PAINLEVEL_OUTOF10: 5
PAINLEVEL_OUTOF10: 2
PAINLEVEL_OUTOF10: 4

## 2018-01-03 ASSESSMENT — PAIN DESCRIPTION - LOCATION
LOCATION: HIP
LOCATION: HIP

## 2018-01-03 ASSESSMENT — PAIN DESCRIPTION - ORIENTATION
ORIENTATION: RIGHT
ORIENTATION: RIGHT

## 2018-01-03 ASSESSMENT — PAIN DESCRIPTION - DESCRIPTORS: DESCRIPTORS: SORE

## 2018-01-03 NOTE — PROGRESS NOTES
required to correct errors made  Insights: Decreased awareness of deficits  Initiation: Requires cues for some  Sequencing: Requires cues for some  Cognition Comment: Comprehension: Supervision, Expression: Supervision, Social Interaction: Independent, Problem Solving: Min, Memory: Min     Perception  Overall Perceptual Status: WFL     Sensation  Overall Sensation Status: WFL      LUE AROM (degrees)  LUE AROM : WFL  Left Hand AROM (degrees)  Left Hand AROM: WFL  RUE AROM (degrees)  RUE AROM : WFL  Right Hand AROM (degrees)  Right Hand AROM: WFL     LUE Strength  Gross LUE Strength: Exceptions to [facility]  L Shoulder Flex: 3+/5  L Shoulder Ext: 3+/5  L Shoulder ABduction: 3+/5  L Shoulder ADduction: 3+/5  L Shoulder Horiz ABduction: 3+/5  L Shoulder Horiz ADduction: 3+/5  L Elbow Flex: 3+/5  L Elbow Ext: 3+/5  L Wrist Flex: 3+/5  L Wrist Ext: 3+/5  L Hand Grasp: 3+/5  L Hand Release: 3+/5  RUE Strength  Gross RUE Strength: Exceptions to [facility]  R Shoulder Flex: 3+/5  R Shoulder Ext: 3+/5  R Shoulder ABduction: 3+/5  R Shoulder ADduction: 3+/5  R Shoulder Horiz ABduction: 3+/5  R Shoulder Horiz ADduction: 3+/5  R Elbow Flex: 3+/5  R Elbow Ext: 3+/5  R Wrist Flex: NT  R Wrist Ext: NT  R Hand Grasp: NT  R Hand Release: NT  RUE Strength Comment: Wrist/hand not tested 2° the pt. with pain from sprained wrist     Hand Dominance  Hand Dominance: Right      Pt weaning off O2 per RN Lisandra; pt. on RA and 94%. Assessment   Performance deficits / Impairments: Decreased functional mobility ; Decreased ADL status; Decreased ROM; Decreased safe awareness;Decreased strength;Decreased endurance;Decreased balance;Decreased high-level IADLs;Decreased coordination;Decreased fine motor control;Decreased cognition  Assessment: Pt. demonstrated decreased balance, strength, endurance and recall of precautions, which impacts her ability to safely perform ADL tasks.  Pt. would benefit from skilled OT to assess these deficits and promote increased independence with ADL tasks  Treatment Diagnosis: Right femoral neck fx s/p fall with R ANN on 12/30/17 with impaired mobility  Prognosis: Good  Decision Making: High Complexity  History: multi comorb  Exam: 10 deficits  Assistance / Modification: Max A  Patient Education: Pt. educated on benefits of OT  Barriers to Learning: None   Discharge Recommendations: Continue to assess pending progress  REQUIRES OT FOLLOW UP: Yes  Activity Tolerance  Activity Tolerance: Patient Tolerated treatment well  Safety Devices  Safety Devices in place: Yes  Type of devices: All fall risk precautions in place        Discharge Recommendations:  Continue to assess pending progress     Plan   Plan  Times per week: 5-7x/week, 15-60 minutes/day  Times per day: Daily  Plan weeks: 1 1/2-2  Current Treatment Recommendations: Strengthening, Balance Training, Endurance Training, Self-Care / ADL, Safety Education & Training, Patient/Caregiver Education & Training, Equipment Evaluation, Education, & procurement, Home Management Training, Cognitive/Perceptual Training    G-Code  AM-PAC Score  Goals  Patient Goals   Patient goals : \"Get better and go home\"     [x]  Patient will complete self care as followed using the recommended adaptive equipment and/or adaptive techniques as instructed:  Feeding: Mod I  Grooming: Independent  Bathing: Mod I  UE Dressing: Mod I  LE Dressing: Mod I  Toileting: Mod I  Toilet Transfers: Mod I  Tub Transfers: Mod I   [x]  Patient will sequence self-care routine with no verbal/tactile cues. [x]  Patient will improve static and dynamic standing balance to complete pants management at Mod I level     [x]  Patient will improve functional endurance to tolerate/complete 60 minutes of ADLs. [x]  Patient will improve R, L UE strength and endurance to 4/5, 4+/5 in order to participate in self-care activities as projected.      [x]  Patient will improve B hand fine motor coordination to [facility] in order to manage clothing

## 2018-01-03 NOTE — PROGRESS NOTES
Physical Therapy Rehab Treatment Note  Facility/Department: [facility]  [facility]       NAME: Katina See  : 1935 (82 y.o.)  MRN: [id number]  CODE STATUS: Full Code    Date of Service: 1/3/2018  Chart Reviewed: Yes  Family / Caregiver Present: No    Restrictions:  Restrictions/Precautions: Fall Risk, Weight Bearing  Lower Extremity Weight Bearing Restrictions  Right Lower Extremity Weight Bearing: Weight Bearing As Tolerated  Upper Extremity Weight Bearing Restrictions  Right Upper Extremity Weight Bearing: Weight Bearing As Tolerated  Left Upper Extremity Weight Bearing: Weight Bearing As Tolerated  Other: RUE Splint for comfort during ambulation per Dr. Oliver Lopez; x-ray negative for fx  Position Activity Restriction  Hip Precautions: Posterior hip precautions  Body mass index is 33.05 kg/m². SUBJECTIVE:       Pre Treatment Pain Screening  Pain at present: 0  Scale Used: Numeric Score  Intervention List: Patient able to continue with treatment    Post Treatment Pain Screening:  Pain Assessment  Pain Level: 3   Right Hip and wrist  Declined intervention    OBJECTIVE:   Overall Orientation Status: Within Normal Limits       Transfers  Comment: Unable to achieve full stand with +1 assist after two attempts   Pt unable to place right UE on platform, but did try to rest elbow on walker. Exercises  Knee Long Arc Quad: x 20  Ankle Pumps: x 20     ASSESSMENT/COMMENTS:  Educated at length on hip precautions with several scenarios for each precaution with pt stating \"understand\". Pt able to recite 2 of 3 hip precautions, initially, with clue for third one needed.          PLAN OF CARE/Safety:   Safety Devices  Type of devices:  (with PTA after evaluation)      Therapy Time:   Individual   Time In 1130   Time Out 1200   Minutes 30     Minutes:30      Transfer/Bed mobility trainin      Gait training:       Neuro re education:       Therapeutic ex: 46 Nevaeh Traylor, SONY, 18 at 12:49

## 2018-01-03 NOTE — H&P
HISTORY & PHYSICAL       DATE OF ADMISSION:  1/2/2018    DATE OF SERVICE:  1/3/18    Subjective:    Uriel James, 80 y.o. female presents today with:     CHIEF COMPLAINT:       This is an 80year old female who was at a restaurant and was coming out of the [place] and tripped and fell. X-rays revealed right femoral neck fracture. On 12/30/17 the patient underwent right hip hemiarthroplasty. The patient also complained of right wrist pain. Initial x-rays at [facility] were negative for fracture. Splint applied for comfort. Repeat x-ray on 12/31/17 was negative for fracture. CXR on 12/29/17 showed bibasilar dependent pulmonary atelectasis. EKG on 12/30/17 showed sinus rhythm with PAC's, incomplete right BBB. X-ray of the right wrist on 01/01/18 showed no fracture or dislocation, degenerative changes first CMC joint. X-ray of the right knee/right femur/pelvis/CXR on 12/29/17 showed right femoral neck fracture with varus deformity, small bibasilar dependent pulmonary atelectasis, advanced degenerative changes right knee, no fractures of the knee. X-ray of the right hip on 12/30/17 showed s/p Rt ALEXIA. The patient has been found to have severe abnormality of gait and mobility with impaired self care due to right femoral neck fracture due to a fall status post right ALEXIA and is admitted to the acute inpatient rehab program.         Hip Pain    The incident occurred more than 1 week ago. The injury mechanism was a fall. The pain is present in the right hip, right thigh and right knee. The pain is at a severity of 9/10. The pain is severe. The pain has been fluctuating since onset. Associated symptoms include a loss of motion and muscle weakness. Pertinent negatives include no inability to bear weight, loss of sensation, numbness or tingling. Foreign body present: alexia. She has tried ice, immobilization, acetaminophen and rest for the symptoms. The treatment provided mild relief.    Wrist Pain    The pain is present in the left hand, left fingers and left wrist. This is a new problem. The current episode started in the past 7 days. There has been a history of trauma. The problem occurs constantly. The problem has been gradually improving. The quality of the pain is described as aching. The pain is at a severity of 6/10. Pertinent negatives include no fever, inability to bear weight, numbness or tingling. The symptoms are aggravated by activity, cold and contact. She has tried movement, heat and acetaminophen for the symptoms. The treatment provided mild relief. Family history does not include gout or rheumatoid arthritis. There is no history of osteoarthritis. The patient has stabilized medically and is able to participate at acute level rehab but is too medically complex for SNF due to complex medical issues as well as need to titrate high-dose high-risk OB pain medication and elderly female with history of anxiety and depression. Imaging:    Imaging and other studies reviewed and discussed with patient and staff    Xr Chest(single View Frontal)    Result Date: 12/29/2017  EXAMINATION: XR CHEST LIMITED, XR FEMUR RIGHT STANDARD, XR PELVIS STANDARD, XR KNEE RIGHT STANDARD CLINICAL HISTORY: Hip fracture. Preop. Immobility. Pain. COMPARISONS: None available. FINDINGS: Single frontal view the chest was obtained with small inspiratory volume, exaggerating heart size and lung density. There is a trace of bibasilar atelectasis. There is no dense consolidation. The mediastinum is not widened or shifted. The chest  wall is unremarkable. AP view the pelvis was obtained. There is an angulated, displaced right femoral neck fracture, resulting in varus deformity. There are no other fractures. Bone density is low. 2 views of the right femur were obtained. The right femoral neck fracture is resulting in varus deformity. Bone density is low. There are no other fractures in the right femur.  4 views of the right knee were portable view supine  CLINICAL HISTORY: Osteoarthritis COMPARISON: None  FINDINGS: Single view submitted There are multiple surgical staples and drain overlying the right hip. Patient is undergone right total hip arthroplasty. Gross satisfactory alignment. No periimplant fracture                                          STATUS POST RIGHT TOTAL HIP ARTHROPLASTY    Xr Femur Right (min 2 Views)  Result Date: 12/29/2017  EXAMINATION: XR CHEST LIMITED, XR FEMUR RIGHT STANDARD, XR PELVIS STANDARD, XR KNEE RIGHT STANDARD CLINICAL HISTORY: Hip fracture. Preop. Immobility. Pain. COMPARISONS: None available. FINDINGS: Single frontal view the chest was obtained with small inspiratory volume, exaggerating heart size and lung density. There is a trace of bibasilar atelectasis. There is no dense consolidation. The mediastinum is not widened or shifted. The chest  wall is unremarkable. AP view the pelvis was obtained. There is an angulated, displaced right femoral neck fracture, resulting in varus deformity. There are no other fractures. Bone density is low. 2 views of the right femur were obtained. The right femoral neck fracture is resulting in varus deformity. Bone density is low. There are no other fractures in the right femur. 4 views of the right knee were obtained. There is advanced degenerative disease, greatest in the lateral compartment. There is no joint effusion. There is no fracture. Soft tissues are unremarkable. CONCLUSION: RIGHT FEMORAL NECK FRACTURE WITH VARUS DEFORMITY. SMALL BIBASILAR DEPENDENT PULMONARY ATELECTASIS. Xr Knee Right (3 Views)  Result Date: 12/29/2017  EXAMINATION: XR CHEST LIMITED, XR FEMUR RIGHT STANDARD, XR PELVIS STANDARD, XR KNEE RIGHT STANDARD CLINICAL HISTORY: Hip fracture. Preop. Immobility. Pain. COMPARISONS: None available. FINDINGS: Single frontal view the chest was obtained with small inspiratory volume, exaggerating heart size and lung density.  There is a trace of bibasilar Value Date    INR 1.0 2017         I discussed results with patient. The patient remains highly medically complex and continues to have severe problems with activities of daily living and mobility. The patient was assessed to be able to tolerate intensive rehabilitation and therefore was admitted to Rehabilitation to address these needs.           In depth analysis of complex functional data; the patient has been:    Current Rehabilitation Assessments:    Physical therapy:   Bed Mobility:   Rolling: - Not Tested  Supine to Sit: -  Maximal Assist +2   Sit to Supine: -  Not Tested               vc's for technique              Trunk lifting assist and assist with R leg out of bed              Increased time and effort                Transfers: @WW - platform  Sit to Stand: - Maximal Assist   Stand to Sit: - Maximal Assist              vc's for technique              Bed elevated for efficiency              2 hands on [employer] for safety              Decreased eccentric control              Increased fatigue                Gait/Ambulation:   Assistive Device: Rolling Walker - platform  Assist Level: Maximal Assist  Distance: 3-4 steps  Surface: linoleum/hardwood  Gait Deviations: small steps, R knee flexed; vc's for bracing and extending R knee for improved weight acceptance    Occupational therapy: FIMS:  Eatin - Feeds self with setup/supervision/cues and/or requires only setup/supervision/cues to perform tube feedings  Groomin - Did not occur  Bathin - Did not occur  Dressing-Upper: 0 - Did not occur  Dressing-Lower: 0 - Did not occur  Toiletin - Total assist  Toilet Transfer: 0 - Did not occur,  ,      Speech therapy: FIMS: Comprehension: 5 - Patient understands basic needs (hungry/hot/pain)  Expression: 5 - Expresses basic ideas/needs only (hungry/hot/pain)  Social Interaction: 5 - Patient is appropriate with supervision/cues  Problem Solvin - Patient able to solve simple/routine prior to admission. Her goal is to get stronger and return home. FAMILY HISTORY:  Does not pertain to chief complaint. Review of Systems   Constitutional: Positive for activity change and fatigue. Negative for appetite change, chills, fever and unexpected weight change. HENT: Negative for ear discharge, ear pain, facial swelling, hearing loss and trouble swallowing. Eyes: Negative for photophobia, pain and redness. Respiratory: Negative for cough, choking, chest tightness, shortness of breath and wheezing. Cardiovascular: Negative for chest pain, palpitations and leg swelling. Gastrointestinal: Positive for constipation. Negative for abdominal distention, abdominal pain, anal bleeding, blood in stool, nausea and vomiting. Endocrine: Positive for cold intolerance. Genitourinary: Negative for difficulty urinating, dysuria, flank pain, frequency and urgency. Musculoskeletal: Positive for arthralgias, back pain, gait problem, joint swelling, myalgias and neck pain. Negative for neck stiffness. Skin: Positive for wound. Negative for color change, pallor and rash. Allergic/Immunologic: Positive for immunocompromised state. Neurological: Positive for dizziness and weakness. Negative for tingling, tremors, syncope, facial asymmetry, speech difficulty, light-headedness, numbness and headaches. Hematological: Negative for adenopathy. Does not bruise/bleed easily. Psychiatric/Behavioral: Positive for sleep disturbance. Negative for agitation, behavioral problems, confusion, decreased concentration, dysphoric mood, hallucinations, self-injury and suicidal ideas. The patient is not nervous/anxious and is not hyperactive. All other systems reviewed and are negative.          Objective  /74   Pulse 79   Temp 98 °F (36.7 °C) (Oral)   Resp 17   Ht 5' 7\" (1.702 m)   Wt 211 lb (95.7 kg)   SpO2 96%   BMI 33.05 kg/m² *    Physical Exam   Constitutional: She is oriented to person, place, and time. Vital signs are normal. She appears well-developed and well-nourished. Non-toxic appearance. She does not have a sickly appearance. She does not appear ill. No distress. HENT:   Head: Normocephalic and atraumatic. Right Ear: Hearing normal.   Left Ear: Hearing normal.   Nose: Nose normal.   Mouth/Throat: Oropharynx is clear and moist and mucous membranes are normal. No oral lesions. Normal dentition. No oropharyngeal exudate. No lesions   Eyes: Conjunctivae and EOM are normal. Pupils are equal, round, and reactive to light. Right eye exhibits no chemosis, no discharge and no exudate. Left eye exhibits no chemosis, no discharge and no exudate. No scleral icterus. Neck: Normal range of motion. Neck supple. No JVD present. No neck rigidity. No tracheal deviation and no edema present. No thyromegaly present. Cardiovascular: Intact distal pulses. Exam reveals no decreased pulses. Pulmonary/Chest: Effort normal. No accessory muscle usage. No apnea, no tachypnea and no bradypnea. No respiratory distress. She has decreased breath sounds. She has no wheezes. She has no rales. She exhibits no tenderness. Abdominal: Soft. Bowel sounds are normal. She exhibits no distension and no mass. There is no tenderness. There is no rebound and no guarding. Musculoskeletal: She exhibits tenderness. She exhibits no edema. Right shoulder: Normal.        Left shoulder: Normal.        Right elbow: Normal.       Left elbow: Normal.        Right wrist: She exhibits decreased range of motion, tenderness and bony tenderness. Left wrist: Normal.        Right hip: She exhibits decreased range of motion, decreased strength and tenderness. Left hip: She exhibits decreased range of motion. She exhibits normal strength and no tenderness. Right knee: She exhibits swelling. Left knee: She exhibits decreased range of motion. Right ankle: She exhibits swelling.  Achilles tendon normal.        Left ankle: She exhibits swelling. Achilles tendon normal.        Cervical back: She exhibits decreased range of motion, tenderness and bony tenderness. Thoracic back: She exhibits decreased range of motion, tenderness and bony tenderness. Lumbar back: She exhibits decreased range of motion, tenderness, bony tenderness and pain. She exhibits no swelling, no edema, no deformity, no laceration and normal pulse. Right upper arm: Normal.        Left upper arm: Normal.        Right forearm: Normal.        Left forearm: Normal.        Right hand: Normal.        Left hand: Normal.        Right upper leg: Normal.        Left upper leg: She exhibits swelling. Right lower leg: Normal.        Left lower leg: She exhibits swelling. Legs:       Right foot: Normal.        Left foot: There is swelling. Tender areas are indicated by numbered spot         Lymphadenopathy:     She has no cervical adenopathy. She has no axillary adenopathy. Right: No inguinal adenopathy present. Left: No inguinal adenopathy present. Neurological: She is alert and oriented to person, place, and time. She displays abnormal reflex. She displays no atrophy and no tremor. A sensory deficit is present. No cranial nerve deficit. She exhibits normal muscle tone. Gait abnormal. Coordination normal. She displays no Babinski's sign on the right side. She displays no Babinski's sign on the left side. Skin: Skin is warm, dry and intact. No abrasion, no bruising, no ecchymosis, no laceration, no petechiae and no rash noted. Rash is not macular, not pustular and not urticarial. She is not diaphoretic. No cyanosis or erythema. No pallor. Nails show no clubbing. Psychiatric: She has a normal mood and affect. Her behavior is normal. Judgment and thought content normal. Her mood appears not anxious. Her affect is not angry, not blunt, not labile and not inappropriate.  Her speech is not rapid and/or pressured, not delayed, not tangential and not slurred. She is not agitated, not aggressive, not hyperactive, not slowed, not withdrawn, not actively hallucinating and not combative. Thought content is not paranoid and not delusional. Cognition and memory are normal. Cognition and memory are not impaired. She does not express impulsivity or inappropriate judgment. She does not exhibit a depressed mood. She expresses no homicidal and no suicidal ideation. She expresses no suicidal plans and no homicidal plans. She is communicative. She exhibits normal recent memory and normal remote memory. She is attentive. Vitals reviewed. Back Exam     Muscle Strength   Right Quadriceps:  3/5   Left Quadriceps:  3/5   Right Hamstrings:  3/5   Left Hamstrings:  2/5           Neurologic Exam     Mental Status   Oriented to person, place, and time. Speech: not slurred   Level of consciousness: alert  Knowledge: good. Cranial Nerves     CN III, IV, VI   Pupils are equal, round, and reactive to light.   Extraocular motions are normal.     Motor Exam   Muscle bulk: normal  Overall muscle tone: normal    Strength   Right neck flexion: 4/5  Left neck flexion: 4/5  Right neck extension: 4/5  Left neck extension: 4/5  Right deltoid: 4/5  Left deltoid: 4/5  Right biceps: 4/5  Left biceps: 4/5  Right triceps: 4/5  Left triceps: 4/5  Right wrist flexion: 1/5  Left wrist flexion: 4/5  Right wrist extension: 1/5  Left wrist extension: 4/5  Right interossei: 4/5  Left interossei: 4/5  Right abdominals: 4/5  Left abdominals: 4/5  Right iliopsoas: 2/5  Left iliopsoas: 3/5  Right quadriceps: 3/5  Left quadriceps: 3/5  Right hamstring: 3/5  Left hamstrin/5  Right glutei: 3/5  Left glutei: 3/5  Right anterior tibial: 4/5  Left anterior tibial: 4/5  Right posterior tibial: 4/5  Left posterior tibial: 4/5  Right peroneal: 4/5  Left peroneal: 4/5  Right gastroc: 4/5  Left gastroc: 4/5    Sensory Exam   Right arm light touch: decreased from fingers  Left arm light touch: decreased from fingers  Right leg light touch: decreased from ankle  Left leg light touch: decreased from ankle    Gait, Coordination, and Reflexes     Gait  Gait: (antalgic)      After extensive review of the records and above physical exam, I have formulated the following diagnoses and plan:      DIAGNOSES:      1. The patient was admitted to the acute rehabilitation unit with the primary rehab diagnoses being severe abnormality of gait and mobility and impaired self care and ADL's due to acute right hip fracture status post right ANN with severe right wrist strain/sprain. Compared to Pre-Admission Assessment, patients medical and functional status remain challengingly complex and patient continues to require intensive therapeutic intervention from multiple therapies, therefore, initiate acute intensive comprehensive inpatient rehabilitation program including PT/OT to improve balance, ambulation, ADLs, and to improve the P/AROM. Functional and medical status reassessed regarding patients ability to participate in therapies and patient found to be able to participate in acute intensive comprehensive inpatient rehabilitation program.  Therapeutic modifications regarding activities in therapies, place, amount of time per day and intensity of therapy made daily. Enroll in acute course of therapy program to include 2 hours per day of PT 5 to 7 days per week and 1 hours per day of OT 5 to 7 days per week, and Rec T 1/2 hour per day 3-5 days per wee. The patient is stable medically and physically on previous exam.       This patient present with significant new onset decreased mobility and inability to perform activities of daily living skills independently and is at significant risk for prolonged disability  [facility]   The patient's current functional and medical status are as well as Voltaren gel  8. Vitamin D deficiency and osteoporotic fractures - supplement and recheck as outpatient. 9.   Postoperative anemia - recheck CBC Monitor vital signs, check H&H prn. 10.    Chronic kidney disease -recheck BMP, limit toxic medications. Encourage hydration  11. Urinary incontinence.-Add timed voiding-check postvoid residual check UA to rule out UTI  12.   Obesity (BMI 30.0-34.9) - Hospital diet, monitor weight, follow-up with family physician after discharge. I am especially concerned about difficulty with weightbearing on the right upper extremity. The patient's high risk medication use includes Percocet. The patient is high risk for urinary tract infection, an admission urinalysis has been ordered. I will have the nurses check post void residual bladder volumes and place a catheter if excessive urine is retained in the bladder after voiding. The patient is risk for deep venous thromosis,complex deep venous thrombosis protocol prophylaxis has been ordered  Lovenox . The patient is high risk for orthostasis and a hydration program and orthostatic blood pressure screening have been ordered. I will attempt to get old records from the patient's previous hospital stay. Care everywhere on [employer] was utilized. 3.  Current and previous medications were reviewed and summarized and compared to old medication lists from home and from the acute floor. 4.  Complex labs and x-rays were reviewed. I will review patient's old EKG and place it on the chart. 5.  Will provide emotional support for this patient regarding adjustment to their disability. Cognition and mood will be screened daily and addressed by rehabilitation psychology and/or speech therapy as appropriate. I have encouraged the patient to attend the Rehab Adjustment to Disability Support Group and recreational therapy. 6.  Estimated length of stay is 3 weeks.   Discharge to home with help from family

## 2018-01-04 LAB
GLUCOSE BLD-MCNC: 157 MG/DL (ref 60–115)
PERFORMED ON: ABNORMAL

## 2018-01-04 PROCEDURE — 97116 GAIT TRAINING THERAPY: CPT

## 2018-01-04 PROCEDURE — 97110 THERAPEUTIC EXERCISES: CPT | Performed by: INTERNAL MEDICINE

## 2018-01-04 PROCEDURE — 97535 SELF CARE MNGMENT TRAINING: CPT

## 2018-01-04 PROCEDURE — 6360000002 HC RX W HCPCS: Performed by: NURSE PRACTITIONER

## 2018-01-04 PROCEDURE — 96125 COGNITIVE TEST BY HC PRO: CPT

## 2018-01-04 PROCEDURE — 6370000000 HC RX 637 (ALT 250 FOR IP): Performed by: PHYSICAL MEDICINE & REHABILITATION

## 2018-01-04 PROCEDURE — 1180000000 HC REHAB R&B

## 2018-01-04 PROCEDURE — 6370000000 HC RX 637 (ALT 250 FOR IP): Performed by: NURSE PRACTITIONER

## 2018-01-04 PROCEDURE — 99233 SBSQ HOSP IP/OBS HIGH 50: CPT | Performed by: PHYSICAL MEDICINE & REHABILITATION

## 2018-01-04 PROCEDURE — 92523 SPEECH SOUND LANG COMPREHEN: CPT

## 2018-01-04 RX ADMIN — ERGOCALCIFEROL 50000 UNITS: 1.25 CAPSULE ORAL at 09:03

## 2018-01-04 RX ADMIN — OXYCODONE HYDROCHLORIDE AND ACETAMINOPHEN 1 TABLET: 5; 325 TABLET ORAL at 09:09

## 2018-01-04 RX ADMIN — ENOXAPARIN SODIUM 40 MG: 40 INJECTION, SOLUTION INTRAVENOUS; SUBCUTANEOUS at 09:03

## 2018-01-04 RX ADMIN — Medication 1 DROP: at 09:05

## 2018-01-04 RX ADMIN — Medication 100 MG: at 12:35

## 2018-01-04 RX ADMIN — Medication 100 MG: at 09:03

## 2018-01-04 RX ADMIN — DOCUSATE SODIUM 100 MG: 100 CAPSULE, LIQUID FILLED ORAL at 09:03

## 2018-01-04 RX ADMIN — ATORVASTATIN CALCIUM 10 MG: 10 TABLET, FILM COATED ORAL at 09:03

## 2018-01-04 RX ADMIN — Medication 1 DROP: at 22:24

## 2018-01-04 RX ADMIN — Medication 1 DROP: at 12:37

## 2018-01-04 RX ADMIN — DOCUSATE SODIUM 100 MG: 100 CAPSULE, LIQUID FILLED ORAL at 22:24

## 2018-01-04 ASSESSMENT — PAIN DESCRIPTION - PAIN TYPE: TYPE: ACUTE PAIN;SURGICAL PAIN

## 2018-01-04 ASSESSMENT — PAIN DESCRIPTION - LOCATION
LOCATION: HIP
LOCATION: HIP

## 2018-01-04 ASSESSMENT — PAIN DESCRIPTION - ORIENTATION
ORIENTATION: RIGHT
ORIENTATION: RIGHT

## 2018-01-04 ASSESSMENT — PAIN DESCRIPTION - DESCRIPTORS
DESCRIPTORS: ACHING
DESCRIPTORS: ACHING;SORE

## 2018-01-04 ASSESSMENT — PAIN SCALES - GENERAL
PAINLEVEL_OUTOF10: 5
PAINLEVEL_OUTOF10: 4
PAINLEVEL_OUTOF10: 4

## 2018-01-04 NOTE — PROGRESS NOTES
Physical Therapy Rehab Treatment Note  [facility]  [facility]       NAME: Kade Garcia  : 1935 (68 y.o.)  MRN: [id number]  CODE STATUS: Full Code    Date of Service: 2018       Restrictions:  Restrictions/Precautions: Fall Risk  Body mass index is 33.05 kg/m². SUBJECTIVE: Subjective: Pt states the fear she had yeaterday is gone today. Pain Screening  Patient Currently in Pain: No       Post Treatment Pain Screenin/10. Pt with minimal reports of R hip pain with movement during session. OBJECTIVE: Reviewed hip prec with pt. Bed mobility  Rolling to Left: Minimal assistance (with pillow between knees)  Rolling to Right: Unable to assess  Supine to Sit: Maximum assistance  Sit to Supine: Moderate assistance  Comment: Step by step VCs for sequence and technique. Transfers  Sit to Stand: Moderate Assistance (+1)  Stand to sit: Minimal Assistance  Bed to Chair: Moderate assistance  Comment: Step by step VCs for technique    Ambulation  Ambulation?: Yes  Ambulation 1  Surface: carpet  Device: Platform Walker right (Utilized R platform d/t pain and difficulty WBing through r wrist.  )  Distance: 20ft  Comments: Decreased stance on R LE. Occasional assist to manage [employer] with turns. Improved foot placement and RICARDO today. progressed to recip pattern. VCs for approach to chair. Activity Tolerance  Activity Tolerance: Patient Tolerated treatment well          ASSESSMENT/COMMENTS:  Assessment: Pt able to states 3/3 hip prec but requires cues and assist to follow. Decreased assist with transfers requiring +1 assist only. Cont to require step by step VCs for transfers with fair carry over. Improved ease and quality of gait today vs yesterday. no WC follow and ambulated +1.       PLAN OF CARE/Safety:   Plan Comment: cont current POC  Safety Devices  Type of devices: Chair alarm in place;Gait belt      Therapy Time:   Individual   Time In 1130   Time Out 1200   Minutes 30     Minutes:      Transfer/Bed mobility trainin      Gait training:10      Neuro re education:     Therapeutic ex:    Martell Beck PTA, 18 at 12:06 PM

## 2018-01-04 NOTE — PROGRESS NOTES
Subjective: The patient complains of moderate to severe  acute  right wrist and right hip pain partially relieved by Lidoderm, rest, splinting, ice, Percocet and exacerbated by  weightbearing and range of motion and palpation. I am concerned about patients progressive anemia and low Vitamin D level, I prescribed iron and Viamin D. I'm concerned about her subtle cognitive deficits. ROS x10: The patient also complains of severely impaired mobility and activities of daily living. Otherwise no new problems with vision, hearing, nose, mouth, throat, dermal, cardiovascular, GI, , pulmonary, musculoskeletal, psychiatric or neurological. See Rehab H&P on Rehab chart dated 01/03/18. Vital signs:  BP (!) 146/66   Pulse 89   Temp 98 °F (36.7 °C) (Oral)   Resp 18   Ht 5' 7\" (1.702 m)   Wt 211 lb (95.7 kg)   SpO2 93%   BMI 33.05 kg/m²   I/O:   PO/Intake:  fair PO intake, no problems observed or reported. Bowel/Bladder:  continent,  constipation  General:  Patient is well developed, adequately nourished, non-obese and     well kempt. HEENT:    PERRLA, hearing intact to loud voice, external inspection of ear     and nose benign. Inspection of lips, tongue and gums benign  Musculoskeletal: No significant change in strength or tone. All joints stable. Inspection and palpation of digits and nails show no clubbing,       cyanosis or inflammatory conditions. Neuro/Psychiatric: Affect: Bright and pleasant. Alert and oriented to person, place and     situation. No significant change in deep tendon reflexes or     Sensation-she has subtle cognitive deficits  Lungs:  Diminished CTA-B. Respiration effort is normal at rest.     Heart:   S1 = S2, RRR. No loud murmurs. Abdomen:  Soft, non-tender, no enlargement of liver or spleen. Extremities:  significant lower extremity edema and right hip tenderness.   Right wrist is less tender   Skin:   Intact to general survey, mild bilateral upper extremity bruising    Rehabilitation:  Physical therapy: FIMS:  Bed Mobility: Scooting: Minimal assistance    Transfers: Sit to Stand: Maximum Assistance (+2)  Stand to sit: Minimal Assistance  Bed to Chair: Maximum assistance (+2 with ww), Ambulation 1  Surface: carpet  Device: Platform Walker right  Assistance: Minimal assistance  Quality of Gait: Narrow RICARDO, decreased step length, assist to advance [employer] initially. VCs and demo for sequence. Slow pace. Distance: 20ftx2  Comments: unable to take steps with attempt for gait with ww(no platform),      FIMS: Bed, Chair, Wheel Chair: 1 - Requires >75% assitance to transfer  Walk: 1 - Total Assistance Walks/operates wheelchair < 50 feet OR requires two or more people OR patient performs < 25% of locomotion effort  Distance Walked: 8  Wheel Chair: 0 - Activity Not Assessed/Does Not Occur  Distance Traveled in Wheel Chair: 0  Stairs: 0 - Activity Does not Occur ( 0 only for the admission assessment),  , Assessment: Pt with max difficulty during sit to stand transfers. Pt requires occasional cues to focus on task at hand. Decreased carryover of cues with transfers. Cues and assist to follow hip precautions with functional mobility. Occupational therapy: FIMS:  Eatin - Feeds self with setup/supervision/cues and/or requires only setup/supervision/cues to perform tube feedings  Groomin - Requires setup/cues to do all tasks  Bathin - Able to bathe 3-4 areas  Dressing-Upper: 5 - Requires setup/supervision/cues and/or requires assist with presthesis/brace only  Dressing-Lower: 0 - Did not occur  Toiletin - Total assist  Toilet Transfer: 0 - Did not occur  Shower Transfer: 0 - Activity does not occur,  , Assessment: Pt. demonstrated decreased balance, strength, endurance and recall of precautions, which impacts her ability to safely perform ADL tasks.  Pt. would benefit from skilled OT to assess these deficits and promote increased independence with ADL tasks    Speech therapy: FIMS: Comprehension: 5 - Patient understands basic needs (hungry/hot/pain)  Expression: 5 - Expresses basic ideas/needs only (hungry/hot/pain)  Social Interaction: 6 - Patient requires medication for mood and/or effect  Problem Solvin - Patient solves simple/routine tasks 75-90%+   Memory: 4 - Patient remembers 75-90%+ of the time      Lab/X-ray studies reviewed, analyzed and discussed with patient and staff:   Recent Results (from the past 24 hour(s))   POCT Glucose    Collection Time: 18  6:21 AM   Result Value Ref Range    POC Glucose 157 (H) 60 - 115 mg/dl    Performed on ACCU-CHEK        Xr Chest (single View Frontal)  Result Date: 2017  CONCLUSION: RIGHT FEMORAL NECK FRACTURE WITH VARUS DEFORMITY. SMALL BIBASILAR DEPENDENT PULMONARY ATELECTASIS. Xr Pelvis (1-2 Views)  Result Date: 2017  CONCLUSION: RIGHT FEMORAL NECK FRACTURE WITH VARUS DEFORMITY. SMALL BIBASILAR DEPENDENT PULMONARY ATELECTASIS. Xr Wrist Right (2 Views)  Result Date: 2018  NO FRACTURE OR DISLOCATION. DEGENERATIVE CHANGES [facility]. Xr Hip Right (2-3 Views)  Result Date: 2017  STATUS POST RIGHT TOTAL HIP ARTHROPLASTY    Xr Femur Right (min 2 Views)  Result Date: 2017  CONCLUSION: RIGHT FEMORAL NECK FRACTURE WITH VARUS DEFORMITY. SMALL BIBASILAR DEPENDENT PULMONARY ATELECTASIS. Xr Knee Right (3 Views)  Result Date: 2017  CONCLUSION: RIGHT FEMORAL NECK FRACTURE WITH VARUS DEFORMITY. SMALL BIBASILAR DEPENDENT PULMONARY ATELECTASIS. Previous extensive, complex labs, notes and diagnostics reviewed and analyzed. ALLERGIES:    Allergies as of 2018 - Review Complete 2018   Allergen Reaction Noted    Cephalexin  2011      (please also verify by checking MAR)    Today I evaluated this patient for periodic reassessment of medical and functional status.   The patient was discussed in detail at the treatment team meeting focusing on healing and breakdown risk:  continue pressure relief program.  Daily skin exams and reports from nursing. 5. Severe Fatigue due to nutritional and hydration deficiency:  continue to monitor I&Os, calorie counts prn, dietary consult prn.  6. Acute episodic insomnia with situational adjustment disorder:  prn Ambien, monitor for day time sedation. 7. Falls risk elevated:  patient to use call light to get nursing assistance to get up, bed and chair alarm. 8. Elevated DVT risk: progressive activities in PT, continue prophylaxis OTONIEL hose, elevation and   .  9. Complex discharge planning:  Discharge date is set for 01/19/18 to home alone with home health PT, OT, RN, aide and . Team meeting Thursday to assess progress towards goals, discuss and address social, psychological and medical comorbidities and to address difficulties they may be having progressing in therapy. Patient and family education is in progress. The patient is to follow-up with their family physician after discharge. Complex Active General Medical Issues that complicate care Assess & Plan:    1. Fracture, hip, right, open type I or II, -status post right total hip arthroplasty - weightbearing as tolerated, up with assist, follow-up with orthopedic surgeon. 2.  History of Panic attacks - add recreational therapy, spiritual care, and music therapy, emotional support, adjust/add medications prn. Add adjustment to disability support group. 3.  Type 2 diabetes mellitus, hyperglycemia, Blood sugar 133 without Glucophage - Continue blood sugar checks BID, 1500-calorie ADA diet, adjust/add medications prn. Recent Labs      01/01/18   1618  01/01/18   2056  01/02/18   1055  01/03/18   0630  01/04/18   0621   POCGLU  126*  120*  166*  133*  157*   4. Venous stasis dermatitis, Seborrheic keratosis,  Punctate keratitis - add ET mix bilateral lower extremities and protect her heels by placing them off the bed  4.    Essential

## 2018-01-04 NOTE — PROGRESS NOTES
Speech Language Pathology   ASSESSMENT OF SPEECH, LANGUAGE, & COGNITION    [id number]/[id number]  Chelsi Boykin  1935    [x] The admitting diagnosis and active problem list as listed below have been reviewed prior to the initiation of this evaluation. 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[facility]    History of cataract extraction    Blepharitis of right eye    Punctate keratitis    Closed fracture of distal end of left radius    Vitamin D deficiency    Fracture of hip, closed, right, initial encounter ([facility]    Gait abnormality    Postoperative anemia    History of TIA (transient ischemic attack)    History of CVA (cerebral vascular accident) 2016 ([facility]    Chronic kidney disease    Urinary incontinence    H/O total hip arthroplasty, right    Abnormality of gait and mobility due to Rt femoral neck fx s/p Rt ANN, [facility] admit 01/02/18    Obesity (BMI 30.0-34. 9)    Hyperlipidemia    Fracture, hip, right, open type I or II, initial encounter ([facility]       Speech/Language Evaluation:  Time in: 1430  Time out: 1440  Cognitive Evaluation:  Time in: 1440  Time out: 1455    OBJECTIVE ASSESSMENT:    Mental status:   [x] Alert           [x]Responsive            [x]Cooperative            [] Uncooperative                 []Aphasic                    []  Confused              []Impulsive              []Unresponsive            [] Glasses  [x] Right handed [] Left Handed       []Hearing Aides    Cognition:    Cog/Linguistic Skills  WFL Impaired  Not Tested    Orientation: Person x        Place/Time/Date x            Attention Sustained x      Divided   x          Problem Solving Simple x      Complex  Cues required when discussing safety for ADLs       Sequencing x            Memory

## 2018-01-04 NOTE — PROGRESS NOTES
Physical Therapy    Facility/Department: [facility] Daily Treatment Note    NAME: Lashay aKthleen    : 1935 (54 y.o.)  MRN: [id number]    Account: [de-identified]  Gender: female    Date of Service: 18      SUBJECTIVE:     Start of tx pain 0/10  Location:   Description:   Response:     End of tx pain 0/10  Location:   Description:   Response:     Falls Safety Armband Present: [x] Yes  [] No    The below exercises were completed to facilitate strength, motor control   and muscular endurance. Seated:   AP: x20   LAQ: x20   Hip Flex: x20   Adduction: x20    The below exercises were completed to facilitate strength, motor control   and muscular endurance.     Supine:  SAQ: x20    Safety/Judgment:     Safety Devices  Type of devices: Chair alarm in place;Gait belt    Minutes:      Transfer/Bed mobility training:      Gait training:      Neuro re education:      Therapeutic ex:30    Therapy Time:    Individual   Time In 1100   Time Out 1130   Minutes 30

## 2018-01-04 NOTE — PLAN OF CARE
Problem: IP COMMUNICATION/DYSARTHRIA  Goal: LTG - patient will improve expressive language skills to allow for communication of wants and needs in daily activities  Outcome: Ongoing  SLE completed

## 2018-01-04 NOTE — PROGRESS NOTES
Physical Therapy    Facility/Department: [facility] Daily Treatment Note    NAME: Sacha Obando    : 1935 (89 y.o.)  MRN: [id number]    Account: [de-identified]  Gender: female    Date of Service: 18      SUBJECTIVE:     Start of tx pain 0/10  Location:   Description:   Response:     End of tx pain 0/10  Location:   Description:   Response:     Falls Safety Armband Present: [x] Yes  [] No    The below exercises were completed to facilitate strength, motor control   and muscular endurance.     Seated:   AP: x20   LAQ: x20   Hip Flex: x20 L   Abduction: x20   Adduction: x20   HS Curls: x20   Glut set: x20  Slider: x20      Safety/Judgment:     Safety Devices  Type of devices: Chair alarm in place;Gait belt    Minutes:      Transfer/Bed mobility training:      Gait training:      Neuro re education:      Therapeutic ex: 30      Therapy Time:    Individual   Time In 1300   Time Out 1330   Minutes 30

## 2018-01-05 LAB
FERRITIN: 339.1 NG/ML (ref 13–150)
GLUCOSE BLD-MCNC: 146 MG/DL (ref 60–115)
GLUCOSE BLD-MCNC: 159 MG/DL (ref 60–115)
PERFORMED ON: ABNORMAL
PERFORMED ON: ABNORMAL

## 2018-01-05 PROCEDURE — 36415 COLL VENOUS BLD VENIPUNCTURE: CPT

## 2018-01-05 PROCEDURE — 6360000002 HC RX W HCPCS: Performed by: NURSE PRACTITIONER

## 2018-01-05 PROCEDURE — 1180000000 HC REHAB R&B

## 2018-01-05 PROCEDURE — 97018 PARAFFIN BATH THERAPY: CPT

## 2018-01-05 PROCEDURE — 97116 GAIT TRAINING THERAPY: CPT

## 2018-01-05 PROCEDURE — 97535 SELF CARE MNGMENT TRAINING: CPT

## 2018-01-05 PROCEDURE — 97530 THERAPEUTIC ACTIVITIES: CPT

## 2018-01-05 PROCEDURE — 6370000000 HC RX 637 (ALT 250 FOR IP): Performed by: PHYSICAL MEDICINE & REHABILITATION

## 2018-01-05 PROCEDURE — 97110 THERAPEUTIC EXERCISES: CPT

## 2018-01-05 PROCEDURE — 99232 SBSQ HOSP IP/OBS MODERATE 35: CPT | Performed by: PHYSICAL MEDICINE & REHABILITATION

## 2018-01-05 PROCEDURE — 6370000000 HC RX 637 (ALT 250 FOR IP): Performed by: NURSE PRACTITIONER

## 2018-01-05 PROCEDURE — 82728 ASSAY OF FERRITIN: CPT

## 2018-01-05 RX ORDER — OXYCODONE HYDROCHLORIDE AND ACETAMINOPHEN 5; 325 MG/1; MG/1
1 TABLET ORAL 3 TIMES DAILY
Status: DISCONTINUED | OUTPATIENT
Start: 2018-01-05 | End: 2018-01-15

## 2018-01-05 RX ADMIN — DOCUSATE SODIUM 100 MG: 100 CAPSULE, LIQUID FILLED ORAL at 20:00

## 2018-01-05 RX ADMIN — ACETAMINOPHEN 650 MG: 325 TABLET, FILM COATED ORAL at 20:02

## 2018-01-05 RX ADMIN — DOCUSATE SODIUM 100 MG: 100 CAPSULE, LIQUID FILLED ORAL at 10:04

## 2018-01-05 RX ADMIN — ERGOCALCIFEROL 50000 UNITS: 1.25 CAPSULE ORAL at 10:03

## 2018-01-05 RX ADMIN — Medication 100 MG: at 10:03

## 2018-01-05 RX ADMIN — ENOXAPARIN SODIUM 40 MG: 40 INJECTION, SOLUTION INTRAVENOUS; SUBCUTANEOUS at 10:03

## 2018-01-05 RX ADMIN — Medication 1 DROP: at 10:04

## 2018-01-05 RX ADMIN — Medication 100 MG: at 12:35

## 2018-01-05 RX ADMIN — Medication 1 DROP: at 16:40

## 2018-01-05 RX ADMIN — Medication 1 DROP: at 20:03

## 2018-01-05 RX ADMIN — OXYCODONE HYDROCHLORIDE AND ACETAMINOPHEN 1 TABLET: 5; 325 TABLET ORAL at 13:52

## 2018-01-05 RX ADMIN — OXYCODONE HYDROCHLORIDE AND ACETAMINOPHEN 1 TABLET: 5; 325 TABLET ORAL at 10:03

## 2018-01-05 RX ADMIN — ATORVASTATIN CALCIUM 10 MG: 10 TABLET, FILM COATED ORAL at 10:07

## 2018-01-05 RX ADMIN — Medication 1 DROP: at 12:32

## 2018-01-05 ASSESSMENT — PAIN SCALES - GENERAL
PAINLEVEL_OUTOF10: 0
PAINLEVEL_OUTOF10: 3
PAINLEVEL_OUTOF10: 3
PAINLEVEL_OUTOF10: 0
PAINLEVEL_OUTOF10: 0

## 2018-01-05 NOTE — PROGRESS NOTES
Able to perform 2 tasks  Toilet Transfer: 1 - Requires > 75% assist getting on & off toilet  Shower Transfer: 0 - Activity does not occur,  , Assessment: Pt. demonstrated decreased balance, strength, endurance and recall of precautions, which impacts her ability to safely perform ADL tasks. Pt. would benefit from skilled OT to assess these deficits and promote increased independence with ADL tasks    Speech therapy: FIMS: Comprehension: 3 - Patient understands basic needs 50-74% of the time  Expression: 4 - Expresses basic ideas/needs 75-90%+ of the time  Social Interaction: 6 - Patient requires medication for mood and/or effect  Problem Solving: 3 - Patient solves simple/routine tasks 50%-74%  Memory: 3 - Patient remembers 50%-74% of the time      Lab/X-ray studies reviewed, analyzed and discussed with patient and staff:   No results found for this or any previous visit (from the past 24 hour(s)). Xr Chest (single View Frontal)  Result Date: 12/29/2017  CONCLUSION: RIGHT FEMORAL NECK FRACTURE WITH VARUS DEFORMITY. SMALL BIBASILAR DEPENDENT PULMONARY ATELECTASIS. Xr Pelvis (1-2 Views)  Result Date: 12/29/2017  CONCLUSION: RIGHT FEMORAL NECK FRACTURE WITH VARUS DEFORMITY. SMALL BIBASILAR DEPENDENT PULMONARY ATELECTASIS. Xr Wrist Right (2 Views)  Result Date: 1/1/2018  NO FRACTURE OR DISLOCATION. DEGENERATIVE CHANGES [facility]. Xr Hip Right (2-3 Views)  Result Date: 12/30/2017  STATUS POST RIGHT TOTAL HIP ARTHROPLASTY    Xr Femur Right (min 2 Views)  Result Date: 12/29/2017  CONCLUSION: RIGHT FEMORAL NECK FRACTURE WITH VARUS DEFORMITY. SMALL BIBASILAR DEPENDENT PULMONARY ATELECTASIS. Xr Knee Right (3 Views)  Result Date: 12/29/2017  CONCLUSION: RIGHT FEMORAL NECK FRACTURE WITH VARUS DEFORMITY. SMALL BIBASILAR DEPENDENT PULMONARY ATELECTASIS. Previous extensive, complex labs, notes and diagnostics reviewed and analyzed.      ALLERGIES:    Allergies as of 01/02/2018 - Review Complete 01/02/2018   Allergen Reaction Noted    Cephalexin  12/19/2011      (please also verify by checking MAR)    Yesterday I evaluated this patient for periodic reassessment of medical and functional status. The patient was discussed in detail at the treatment team meeting focusing on current medical issues, progress in therapies, social issues, psychological issues, barriers to progress and strategies to address these barriers, and discharge planning. See the hand written addendum to rehab progress note. The patient continues to be high risk for future disability and their medical and rehabilitation prognosis continue to be good and therefore, we will continue the patient's rehabilitation course as planned. The patient's tentative discharge date was set. Patient and family education was discussed. The patient was made aware of the team discussion regarding their progress. Discharge plans were discussed along with barriers to progress and strategies to address these barriers, patient encouraged to continue to discuss discharge plans with . Complex Physical Medicine & Rehab Issues Assess & Plan:   1. Severe abnormality of gait and mobility and impaired self-care and ADL's secondary to acute right hip fracture status post right ANN with severe right wrist strain/sprain. Functional and medical status reassessed regarding patients ability to participate in therapies and patient found to be able to participate in acute intensive comprehensive inpatient rehabilitation program including PT/OT to improve balance, ambulation, ADLs, and to improve the P/AROM. Therapeutic modifications regarding activities in therapies, place, amount of time per day and intensity of therapy made daily. In bed therapies or bedside therapies prn.   2. Bowel and Bladder dysfunction:  frequent toileting, ambulate to bathroom with assistance, check post void residuals.   Check for C.difficile x1 if >2 loose stools in 24 hours, continue bowel & bladder program.  Monitor bowel and bladder function. Lactinex 2 PO every AC. MOM prn, Brown Bomb prn, Glycerin suppository prn, enema prn.  3. Severe right hip and right wrist pain generalized OA pain: reassess pain every shift and prior to and after each therapy session, give prn Tylenol and scheduled Percocet, modalities prn in therapy, Lidoderm, K-pad prn. I reviewed OARRS report, no significant opiates, no significant sedatives. Pain is better with scheduled Percocet, I will monitor for sedation. 4. Skin healing and breakdown risk:  continue pressure relief program.  Daily skin exams and reports from nursing. 5. Severe Fatigue due to nutritional and hydration deficiency:  continue to monitor I&Os, calorie counts prn, dietary consult prn.  6. Acute episodic insomnia with situational adjustment disorder:  prn Ambien, monitor for day time sedation. 7. Falls risk elevated:  patient to use call light to get nursing assistance to get up, bed and chair alarm. 8. Elevated DVT risk: progressive activities in PT, continue prophylaxis OTONIEL hose, elevation and   .  9. Complex discharge planning:  Discharge date is set for 01/19/18 to home alone with home health PT, OT, RN, aide and . Team meeting Thursday to assess progress towards goals, discuss and address social, psychological and medical comorbidities and to address difficulties they may be having progressing in therapy. Patient and family education is in progress. The patient is to follow-up with their family physician after discharge. Complex Active General Medical Issues that complicate care Assess & Plan:    1. Fracture, hip, right, open type I or II, -status post right total hip arthroplasty - weightbearing as tolerated, up with assist, follow-up with orthopedic surgeon. 2.  History of Panic attacks - add recreational therapy, spiritual care, and music therapy, emotional support, adjust/add medications prn. PM&R     Attending    [address]

## 2018-01-05 NOTE — PROGRESS NOTES
Occupational Therapy  Facility/Department: [facility]  Daily Treatment Note  NAME: Lashay Kathleen  : 1935  MRN: [id number]    Date of Service: 2018    Patient Diagnosis(es): There were no encounter diagnoses. has a past medical history of Abnormality of gait and mobility; Chronic kidney disease; Closed fracture of distal end of left radius; CVA (cerebral vascular accident) ([facility] Essential hypertension; Fracture of hip, closed, right, initial encounter ([facility] H/O total hip arthroplasty, right; History of TIA (transient ischemic attack); Hypercholesteremia; Hyperlipidemia; Obesity (BMI 30.0-34.9); Panic attacks; Postoperative anemia; Type 2 diabetes mellitus without complication ([facility] Urinary incontinence; Venous stasis dermatitis; and Vitamin D deficiency. has a past surgical history that includes Appendectomy (); Upper gastrointestinal endoscopy (03); Colonoscopy; eye surgery; fracture surgery; and partial hip replacement (Right, 2017).     Restrictions  Restrictions/Precautions  Restrictions/Precautions: Fall Risk  Lower Extremity Weight Bearing Restrictions  Right Lower Extremity Weight Bearing: Weight Bearing As Tolerated  Upper Extremity Weight Bearing Restrictions  Right Upper Extremity Weight Bearing: Weight Bearing As Tolerated  Left Upper Extremity Weight Bearing: Weight Bearing As Tolerated  Other: RUE Splint for comfort during ambulation per Dr. Dalton Cruz; x-ray negative for fx  Position Activity Restriction  Hip Precautions: Posterior hip precautions  Subjective   General  Chart Reviewed: Yes  Referring Practitioner: Dr. Dalton Cruz  Diagnosis: Right femoral neck fx s/p fall with R ANN on 17 with impaired mobility  Pre Treatment Pain Screening  Pain at present: 2  Scale Used: Numeric Score  Intervention List: Patient able to continue with treatment  Comments / Details: Pt. wrist aching  Pain Assessment  Patient Currently in Pain: No  Pain Assessment: 0-10  Pain Level: 0  Vital Signs  Patient Currently in Pain: No   Orientation     Objective      Pt. stated at start of tx that she needed to toilet, and returned to room 2° need for toilet riser. ADL  Toileting: Maximum assistance  Additional Comments: Pt. had toileting urgency and was unable to attempt to manage pants independently 2° fear of incontinence. Standing Balance  Sit to stand: Moderate assistance  Stand to sit: Minimal assistance  Toilet Transfers  Toilet - Technique: Stand step  Equipment Used: Grab bars  Toilet Transfer: Moderate assistance  Toilet Transfers Comments: Moderate assist with verbal cues and increased time. Transfers  Sit to stand: Moderate assistance  Stand to sit: Minimal assistance     Pt. stood with mod A to dip hand in paraffin and CGA to maintain balance x 4 minutes to complete modality. Pt. required increased A to return to sit 2° paraffin on hand. Pt. tolerated paraffin well and squeezed/manipulated wax after wax removal. Pt. reported increased comfort. DIVYA Ann notified pt. did not have pain patch and came to therapy to apply after paraffin removed. Modalities: Yes  Paraffin  Number Minutes Paraffin: 15 minutes  Paraffin Location: Right;Hand;Wrist    Pt. completed repetitive reaching task first with L hand only with 1lb wrist weight and then with R hand to improve fine motor coordination after wax removed. Pt. placed/removed golf tees and marbles on board (placed all with L hand, removed 50% with R hand and remainder with L hand) Pt. had F follow through of technique provided by therapist and required increased time to complete. Pt. had F coordination and dropped marbles. Assessment      Discharge Recommendations: Continue to assess pending progress  Activity Tolerance  Activity Tolerance: Patient Tolerated treatment well  Safety Devices  Safety Devices in place: Yes  Type of devices:  All fall risk precautions in place       Discharge Recommendations:  Continue to assess pending

## 2018-01-06 LAB
FERRITIN: 330.1 NG/ML (ref 13–150)
GLUCOSE BLD-MCNC: 143 MG/DL (ref 60–115)
PERFORMED ON: ABNORMAL

## 2018-01-06 PROCEDURE — 97530 THERAPEUTIC ACTIVITIES: CPT

## 2018-01-06 PROCEDURE — 99232 SBSQ HOSP IP/OBS MODERATE 35: CPT | Performed by: PHYSICAL MEDICINE & REHABILITATION

## 2018-01-06 PROCEDURE — 97110 THERAPEUTIC EXERCISES: CPT

## 2018-01-06 PROCEDURE — 97116 GAIT TRAINING THERAPY: CPT

## 2018-01-06 PROCEDURE — 82728 ASSAY OF FERRITIN: CPT

## 2018-01-06 PROCEDURE — 97535 SELF CARE MNGMENT TRAINING: CPT

## 2018-01-06 PROCEDURE — 6360000002 HC RX W HCPCS: Performed by: NURSE PRACTITIONER

## 2018-01-06 PROCEDURE — 36415 COLL VENOUS BLD VENIPUNCTURE: CPT

## 2018-01-06 PROCEDURE — 6370000000 HC RX 637 (ALT 250 FOR IP): Performed by: NURSE PRACTITIONER

## 2018-01-06 PROCEDURE — 6370000000 HC RX 637 (ALT 250 FOR IP): Performed by: PHYSICAL MEDICINE & REHABILITATION

## 2018-01-06 PROCEDURE — 1180000000 HC REHAB R&B

## 2018-01-06 RX ADMIN — ACETAMINOPHEN 650 MG: 325 TABLET, FILM COATED ORAL at 21:24

## 2018-01-06 RX ADMIN — Medication 100 MG: at 09:36

## 2018-01-06 RX ADMIN — DOCUSATE SODIUM 100 MG: 100 CAPSULE, LIQUID FILLED ORAL at 21:20

## 2018-01-06 RX ADMIN — ATORVASTATIN CALCIUM 10 MG: 10 TABLET, FILM COATED ORAL at 09:33

## 2018-01-06 RX ADMIN — ENOXAPARIN SODIUM 40 MG: 40 INJECTION, SOLUTION INTRAVENOUS; SUBCUTANEOUS at 09:33

## 2018-01-06 RX ADMIN — DOCUSATE SODIUM 100 MG: 100 CAPSULE, LIQUID FILLED ORAL at 09:33

## 2018-01-06 RX ADMIN — Medication 100 MG: at 13:32

## 2018-01-06 RX ADMIN — OXYCODONE HYDROCHLORIDE AND ACETAMINOPHEN 1 TABLET: 5; 325 TABLET ORAL at 09:33

## 2018-01-06 RX ADMIN — OXYCODONE HYDROCHLORIDE AND ACETAMINOPHEN 1 TABLET: 5; 325 TABLET ORAL at 13:32

## 2018-01-06 RX ADMIN — Medication 1 DROP: at 09:39

## 2018-01-06 ASSESSMENT — PAIN SCALES - GENERAL
PAINLEVEL_OUTOF10: 0
PAINLEVEL_OUTOF10: 3
PAINLEVEL_OUTOF10: 0
PAINLEVEL_OUTOF10: 0
PAINLEVEL_OUTOF10: 4
PAINLEVEL_OUTOF10: 0
PAINLEVEL_OUTOF10: 0

## 2018-01-06 NOTE — PROGRESS NOTES
Occupational Therapy  Facility/Department: [facility]  Daily Treatment Note  NAME: Jana Bah  : 1935  MRN: [id number]    Date of Service: 2018    Patient Diagnosis(es): There were no encounter diagnoses. has a past medical history of Abnormality of gait and mobility; Chronic kidney disease; Closed fracture of distal end of left radius; [facility]cere[facility]) ([facility] Essential hypertension; Fracture of hip, closed, right, initial encounter ([facility] H/O total hip arthroplasty, right; History of TIA (transient ischemic attack); Hypercholesteremia; Hyperlipidemia; Obesity (BMI 30.0-34.9); Panic attacks; Postoperative anemia; Type 2 diabetes mellitus without complication ([facility] Urinary incontinence; Venous stasis dermatitis; and Vitamin D deficiency. has a past surgical history that includes Appendectomy (); Upper gastrointestinal endoscopy (03); Colonoscopy; eye surgery; fracture surgery; and partial hip replacement (Right, 2017).     Restrictions  Restrictions/Precautions  Restrictions/Precautions: Fall Risk  Lower Extremity Weight Bearing Restrictions  Right Lower Extremity Weight Bearing: Weight Bearing As Tolerated  Upper Extremity Weight Bearing Restrictions  Right Upper Extremity Weight Bearing: Weight Bearing As Tolerated  Left Upper Extremity Weight Bearing: Weight Bearing As Tolerated  Other: RUE Splint for comfort during ambulation per Dr. Johnny Hammer; x-ray negative for fx  Position Activity Restriction  Hip Precautions: Posterior hip precautions  Other position/activity restrictions: Pt able to recall 2/3/ hip precations  Subjective   General  Chart Reviewed: Yes  Referring Practitioner: Dr. Johnny Hammer  Diagnosis: Right femoral neck fx s/p fall with R ANN on 17 with impaired mobility  Pre Treatment Pain Screening  Pain at present: 0  Scale Used: Numeric Score  Pain Assessment  Patient Currently in Pain: No  Pain Assessment: 0-10  Pain Level: 0  Vital Signs  Patient

## 2018-01-06 NOTE — PROGRESS NOTES
Physical Therapy Rehab Treatment Note  Facility/Department: [facility]  Room: [facility]9/H266-02       NAME: Denisse Hameed  : 1935 (73 y.o.)  MRN: [id number]  CODE STATUS: Full Code    Date of Service: 2018  Chart Reviewed: Yes    Restrictions:  Position Activity Restriction  Hip Precautions: Posterior hip precautions  Other position/activity restrictions: Pt able to recall 2/3 hip precautions  Body mass index is 33.05 kg/m². SUBJECTIVE: Subjective: Pt with no new c/o or concerns in pm  Pain Screening  Patient Currently in Pain: No       Post Treatment Pain Screening:none  Pain Assessment  Pain Assessment: 0-10  Pain Level: 0    OBJECTIVE:               Balance  Sitting - Static: Good  Sitting - Dynamic: Good  Standing - Static: Fair;+  Standing - Dynamic: Fair  Comments: Pt stood x 5 minutes in platform walker. weight shifts A/P; side to side           Transfers  Sit to Stand: Contact guard assistance;Minimal Assistance  Stand to sit: Contact guard assistance  Comment: VC for hand placement    Ambulation  Ambulation?: Yes  Ambulation 1  Surface: carpet  Device: Platform Walker right  Assistance: Contact guard assistance  Quality of Gait: Decreased stance on R LE NBOS.     Distance: 50ft x 2  Stairs/Curb  Stairs?: No             Exercises  Exercise 1: seated ex  Exercise 2: LAQx 20ea  Exercise 3: QS x 20ea  Exercise 4: GS x 20ea  Exercise 6: supine ex  Exercise 7: AP x 20ea  Exercise 8: QS x 20  Exercise 9: GS x 20  Exercise 11: standing ex  Exercise 12: marching x 10ea  Exercise 13: up on toes x 10     ASSESSMENT/COMMENTS:  Assessment: Pt with decreased physical assist with functional mobility but requires time and VCs with fair carryover    PLAN OF CARE/Safety:   Safety Devices  Type of devices: Chair alarm in place;Gait belt      Therapy Time:   Individual   Time In 1330   Time Out 1430   Minutes 60     Minutes:      Transfer/Bed mobility training:15      Gait training:15      Neuro re education:5

## 2018-01-06 NOTE — PROGRESS NOTES
143*   4. Venous stasis dermatitis, Seborrheic keratosis,  Punctate keratitis - add ET mix bilateral lower extremities and protect her heels by placing them off the bed  4. 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 5.   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 The prognosis for a subependymoma is better than for most ependymal tumors, and is considered a grade I tumor in the [facility]) classification. 6.   Severe Right UE pain - no fracture, healed Closed fracture of distal end of left radius now with acute right wrist sprain - upgrade to WBAT, right wrist splint for comfort only. Add lidocaine patch as well as Voltaren gel. 7.   Vitamin D deficiency and osteoporotic fractures - supplement and recheck as outpatient. 8.  Progressive Postoperative anemia - recheck CBC. Monitor vital signs, check H&H prn. I prescribed iron. 9.    Chronic kidney disease - recheck BMP, limit toxic medications. Encourage hydration. 10.   Urinary incontinence - Add timed voiding, check postvoid residual, check UA to rule out UTI. 11.   Obesity (BMI 30.0-34.9) - Hospital diet, monitor weight, follow-up with family physician after discharge. 12. Mild dementia -  Moderate cognitive deficits recognize status post assessment with SLP.   Will add memory exercises memory no plaque and limited toxic medications                     Barry Wyman D.O., PM&R     Attending    [address] [facility]

## 2018-01-07 LAB
FERRITIN: 329.5 NG/ML (ref 13–150)
GLUCOSE BLD-MCNC: 133 MG/DL (ref 60–115)
GLUCOSE BLD-MCNC: 139 MG/DL (ref 60–115)
GLUCOSE BLD-MCNC: 179 MG/DL (ref 60–115)
PERFORMED ON: ABNORMAL

## 2018-01-07 PROCEDURE — 97116 GAIT TRAINING THERAPY: CPT

## 2018-01-07 PROCEDURE — 99232 SBSQ HOSP IP/OBS MODERATE 35: CPT | Performed by: PHYSICAL MEDICINE & REHABILITATION

## 2018-01-07 PROCEDURE — 36415 COLL VENOUS BLD VENIPUNCTURE: CPT

## 2018-01-07 PROCEDURE — 97110 THERAPEUTIC EXERCISES: CPT

## 2018-01-07 PROCEDURE — 1180000000 HC REHAB R&B

## 2018-01-07 PROCEDURE — 6360000002 HC RX W HCPCS: Performed by: NURSE PRACTITIONER

## 2018-01-07 PROCEDURE — 6370000000 HC RX 637 (ALT 250 FOR IP): Performed by: NURSE PRACTITIONER

## 2018-01-07 PROCEDURE — 6370000000 HC RX 637 (ALT 250 FOR IP): Performed by: PHYSICAL MEDICINE & REHABILITATION

## 2018-01-07 PROCEDURE — 82728 ASSAY OF FERRITIN: CPT

## 2018-01-07 RX ADMIN — Medication 1 DROP: at 08:35

## 2018-01-07 RX ADMIN — OXYCODONE HYDROCHLORIDE AND ACETAMINOPHEN 1 TABLET: 5; 325 TABLET ORAL at 08:31

## 2018-01-07 RX ADMIN — DOCUSATE SODIUM 100 MG: 100 CAPSULE, LIQUID FILLED ORAL at 08:28

## 2018-01-07 RX ADMIN — Medication 1 DROP: at 16:53

## 2018-01-07 RX ADMIN — ACETAMINOPHEN 650 MG: 325 TABLET, FILM COATED ORAL at 14:42

## 2018-01-07 RX ADMIN — Medication 1 DROP: at 13:24

## 2018-01-07 RX ADMIN — Medication 100 MG: at 13:21

## 2018-01-07 RX ADMIN — ENOXAPARIN SODIUM 40 MG: 40 INJECTION, SOLUTION INTRAVENOUS; SUBCUTANEOUS at 08:28

## 2018-01-07 RX ADMIN — ACETAMINOPHEN 650 MG: 325 TABLET, FILM COATED ORAL at 21:59

## 2018-01-07 RX ADMIN — Medication 100 MG: at 08:28

## 2018-01-07 RX ADMIN — ATORVASTATIN CALCIUM 10 MG: 10 TABLET, FILM COATED ORAL at 21:59

## 2018-01-07 RX ADMIN — DOCUSATE SODIUM 100 MG: 100 CAPSULE, LIQUID FILLED ORAL at 21:59

## 2018-01-07 ASSESSMENT — PAIN SCALES - GENERAL
PAINLEVEL_OUTOF10: 0
PAINLEVEL_OUTOF10: 0
PAINLEVEL_OUTOF10: 4
PAINLEVEL_OUTOF10: 0
PAINLEVEL_OUTOF10: 5

## 2018-01-07 NOTE — PROGRESS NOTES
Subjective: The patient complains of moderate to severe acute  right wrist and right hip pain partially relieved by Lidoderm, rest, splinting, ice, Percocet and exacerbated by  weightbearing and range of motion and palpation. I am concerned about patients progressive anemia and low Vitamin D level, I prescribed iron and Viamin D. I'm concerned that she is getting daily ferritin levels. I have discontinued them. She complains of increased right hand pain improving with rest and Lidoderm on each side. Her H&H seems to be stabilizing with the iron. She complains of right wrist pain but it is improving nicely atelectasis transition her out of the brace and into and Ace wrap. I am also concerned about the increased drainage in her right hip incision the Aquacel was removed and will continue to do dry sterile dressings every shift monitor for infection. ROS x10: The patient also complains of severely impaired mobility and activities of daily living. Otherwise no new problems with vision, hearing, nose, mouth, throat, dermal, cardiovascular, GI, , pulmonary, musculoskeletal, psychiatric or neurological. See Rehab H&P on Rehab chart dated 01/03/18. Vital signs:  /60   Pulse 78   Temp 97 °F (36.1 °C) (Oral)   Resp 18   Ht 5' 7\" (1.702 m)   Wt 211 lb (95.7 kg)   SpO2 95%   BMI 33.05 kg/m²   I/O:   PO/Intake:  fair PO intake, no problems observed or reported. Bowel/Bladder:  continent,  constipation  General:  Patient is well developed, adequately nourished, non-obese and     well kempt. HEENT:    PERRLA, hearing intact to loud voice, external inspection of ear     and nose benign. Inspection of lips, tongue and gums benign  Musculoskeletal: No significant change in strength or tone. All joints stable. Inspection and palpation of digits and nails show no clubbing,       cyanosis or inflammatory conditions. Neuro/Psychiatric: Affect: Bright and pleasant.   Alert and only for the admission assessment),  , Assessment: Pt with decreased physical assist with functional mobility but requires time and VCs with fair carryover    Occupational therapy: FIMS:  Eatin - Feeds self with setup/supervision/cues and/or requires only setup/supervision/cues to perform tube feedings  Groomin - Did not occur  Bathin - Did not occur  Dressing-Upper: 0 - Did not occur  Dressing-Lower: 0 - Did not occur  Toiletin - Total assist  Toilet Transfer: 3 - Requires 25-49% assist getting off toilet  Tub Transfer: 0 - Activity does not occur  Shower Transfer: 0 - Activity does not occur,  , Assessment: Pt. demonstrated decreased balance, strength, endurance and recall of precautions, which impacts her ability to safely perform ADL tasks. Pt. would benefit from skilled OT to assess these deficits and promote increased independence with ADL tasks    Speech therapy: FIMS: Comprehension: 3 - Patient understands basic needs 50-74% of the time  Expression: 4 - Expresses basic ideas/needs 75-90%+ of the time  Social Interaction: 6 - Patient requires medication for mood and/or effect  Problem Solving: 3 - Patient solves simple/routine tasks 50%-74%  Memory: 3 - Patient remembers 50%-74% of the time      Lab/X-ray studies reviewed, analyzed and discussed with patient and staff:   Recent Results (from the past 24 hour(s))   Ferritin    Collection Time: 18  6:14 AM   Result Value Ref Range    Ferritin 329.5 (H) 13.0 - 150.0 ng/mL   POCT Glucose    Collection Time: 18  6:46 AM   Result Value Ref Range    POC Glucose 133 (H) 60 - 115 mg/dl    Performed on ACCU-CHEK    POCT Glucose    Collection Time: 18  7:51 AM   Result Value Ref Range    POC Glucose 139 (H) 60 - 115 mg/dl    Performed on ACCU-CHEK           Xr Pelvis (1-2 Views)  Result Date: 2017  CONCLUSION: RIGHT FEMORAL NECK FRACTURE WITH VARUS DEFORMITY. SMALL BIBASILAR DEPENDENT PULMONARY ATELECTASIS.     Xr Wrist Right (2 Blood sugar 133 without Glucophage - Continue blood sugar checks BID, 1500-calorie ADA diet, adjust/add medications prn. Recent Labs      01/05/18   1628  01/05/18 2011 01/06/18   0631  01/07/18   0646  01/07/18   0751   POCGLU  146*  159*  143*  133*  139*   4. Venous stasis dermatitis, Seborrheic keratosis,  Punctate keratitis - add ET mix bilateral lower extremities and protect her heels by placing them off the bed  4. 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 5.   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 The prognosis for a subependymoma is better than for most ependymal tumors, and is considered a grade I tumor in the [address]) classification. 6.   Severe Right UE pain - no fracture, healed Closed fracture of distal end of left radius now with acute right wrist sprain - upgrade to WBAT, right wrist splint for comfort only. Add lidocaine patch as well as Voltaren gel. 7.   Vitamin D deficiency and osteoporotic fractures - supplement and recheck as outpatient. 8.  Progressive Postoperative anemia -likely secondary to [place] numbness drainage at incision site recheck CBC. Monitor vital signs, check H&H prn. I prescribed iron. Discontinue daily ferritin levels  9. Chronic kidney disease - recheck BMP, limit toxic medications. Encourage hydration. 10.   Urinary incontinence - Add timed voiding, check postvoid residual, check UA to rule out UTI. 11.   Obesity (BMI 30.0-34.9) - Hospital diet, monitor weight, follow-up with family physician after discharge. 12. Mild dementia -  Moderate cognitive deficits recognize status post assessment with SLP.   Will add memory exercises memory no plaque and limited toxic medications                     Sal Guillory D.O., PM&R     Attending    [address] [place], [place] [address]en [facility]

## 2018-01-08 PROCEDURE — 1180000000 HC REHAB R&B

## 2018-01-08 PROCEDURE — 97110 THERAPEUTIC EXERCISES: CPT

## 2018-01-08 PROCEDURE — 97535 SELF CARE MNGMENT TRAINING: CPT

## 2018-01-08 PROCEDURE — 6370000000 HC RX 637 (ALT 250 FOR IP): Performed by: PHYSICAL MEDICINE & REHABILITATION

## 2018-01-08 PROCEDURE — 6370000000 HC RX 637 (ALT 250 FOR IP): Performed by: NURSE PRACTITIONER

## 2018-01-08 PROCEDURE — 6360000002 HC RX W HCPCS: Performed by: NURSE PRACTITIONER

## 2018-01-08 PROCEDURE — 97530 THERAPEUTIC ACTIVITIES: CPT

## 2018-01-08 PROCEDURE — 99232 SBSQ HOSP IP/OBS MODERATE 35: CPT | Performed by: PHYSICAL MEDICINE & REHABILITATION

## 2018-01-08 PROCEDURE — 97116 GAIT TRAINING THERAPY: CPT

## 2018-01-08 RX ADMIN — OXYCODONE HYDROCHLORIDE AND ACETAMINOPHEN 1 TABLET: 5; 325 TABLET ORAL at 08:30

## 2018-01-08 RX ADMIN — Medication 100 MG: at 08:26

## 2018-01-08 RX ADMIN — Medication 1 DROP: at 08:34

## 2018-01-08 RX ADMIN — DOCUSATE SODIUM 100 MG: 100 CAPSULE, LIQUID FILLED ORAL at 19:48

## 2018-01-08 RX ADMIN — Medication 100 MG: at 12:05

## 2018-01-08 RX ADMIN — ENOXAPARIN SODIUM 40 MG: 40 INJECTION, SOLUTION INTRAVENOUS; SUBCUTANEOUS at 08:26

## 2018-01-08 RX ADMIN — Medication 1 DROP: at 14:23

## 2018-01-08 RX ADMIN — ACETAMINOPHEN 650 MG: 325 TABLET, FILM COATED ORAL at 19:53

## 2018-01-08 RX ADMIN — ATORVASTATIN CALCIUM 10 MG: 10 TABLET, FILM COATED ORAL at 19:48

## 2018-01-08 RX ADMIN — DOCUSATE SODIUM 100 MG: 100 CAPSULE, LIQUID FILLED ORAL at 08:26

## 2018-01-08 RX ADMIN — ACETAMINOPHEN 650 MG: 325 TABLET, FILM COATED ORAL at 14:22

## 2018-01-08 ASSESSMENT — PAIN DESCRIPTION - DESCRIPTORS
DESCRIPTORS: ACHING
DESCRIPTORS: SORE
DESCRIPTORS: ACHING
DESCRIPTORS: ACHING

## 2018-01-08 ASSESSMENT — PAIN DESCRIPTION - PAIN TYPE: TYPE: ACUTE PAIN

## 2018-01-08 ASSESSMENT — PAIN SCALES - GENERAL
PAINLEVEL_OUTOF10: 0
PAINLEVEL_OUTOF10: 3
PAINLEVEL_OUTOF10: 3
PAINLEVEL_OUTOF10: 4
PAINLEVEL_OUTOF10: 0
PAINLEVEL_OUTOF10: 2
PAINLEVEL_OUTOF10: 4
PAINLEVEL_OUTOF10: 3
PAINLEVEL_OUTOF10: 3
PAINLEVEL_OUTOF10: 0

## 2018-01-08 ASSESSMENT — PAIN DESCRIPTION - LOCATION
LOCATION: ARM
LOCATION: ARM
LOCATION: ARM;HIP

## 2018-01-08 ASSESSMENT — PAIN DESCRIPTION - ORIENTATION
ORIENTATION: RIGHT;LEFT
ORIENTATION: RIGHT
ORIENTATION: RIGHT;LEFT

## 2018-01-08 ASSESSMENT — PAIN DESCRIPTION - FREQUENCY: FREQUENCY: CONTINUOUS

## 2018-01-08 NOTE — PROGRESS NOTES
Education & Training, Patient/Caregiver Education & Training, Equipment Evaluation, Education, & procurement, Home Management Training, Cognitive/Perceptual Training  Plan Comment: Continue POC  G-Code  OutComes Score  AM-PAC Score  Goals  Patient Goals   Patient goals :  \"Get better and go home\"       Therapy Time   Individual Concurrent Group Co-treatment   Time In 0845         Time Out 0930         Minutes Zully Noble OTR/L Electronically signed by HAO Muñoz/VIKY on 1/8/2018 at 4:31 PM

## 2018-01-08 NOTE — PROGRESS NOTES
Subjective: The patient complains of moderate to severe acute  right wrist and right hip pain partially relieved by Lidoderm, rest, splinting, ice, Percocet and exacerbated by  weightbearing and range of motion and palpation. Her right wrist tenderness seems to be improving and hoping that she'll be able to use it more with ambulation. I'm concerned that she was getting daily ferritin levels. I have discontinued them. I am also concerned about the increased drainage in her right hip incision the Aquacel was removed and will continue to do dry sterile dressings every shift monitor for infection. We are trialing ambulation without splint, Ace Wrap and Lidoderm. ROS x10: The patient also complains of severely impaired mobility and activities of daily living. Otherwise no new problems with vision, hearing, nose, mouth, throat, dermal, cardiovascular, GI, , pulmonary, musculoskeletal, psychiatric or neurological. See Rehab H&P on Rehab chart dated 01/03/18. Vital signs:  /75   Pulse 76   Temp 97 °F (36.1 °C) (Oral)   Resp 18   Ht 5' 7\" (1.702 m)   Wt 211 lb (95.7 kg)   SpO2 94%   BMI 33.05 kg/m²   I/O:   PO/Intake:  fair PO intake, no problems observed or reported. Bowel/Bladder:  continent,  constipation  General:  Patient is well developed, adequately nourished, non-obese and     well kempt. HEENT:    PERRLA, hearing intact to loud voice, external inspection of ear     and nose benign. Inspection of lips, tongue and gums benign  Musculoskeletal: No significant change in strength or tone. All joints stable. Inspection and palpation of digits and nails show no clubbing,       cyanosis or inflammatory conditions. Neuro/Psychiatric: Affect: Bright and pleasant. Alert and oriented to person, place and     situation. No significant change in deep tendon reflexes or     Sensation-she has subtle cognitive deficits  Lungs:  Diminished CTA-B.  Respiration effort is normal at rest.     Heart:   S1 = S2, RRR. No loud murmurs. Abdomen:  Soft, non-tender, no enlargement of liver or spleen. Extremities:  significant lower extremity edema and right hip tenderness. Right wrist is less tender   Skin:    Significant servicing demonstration of the right hip incision mild bilateral upper extremity bruising    Rehabilitation:  Physical therapy: FIMS:  Bed Mobility: Supine to Sit: Minimal assistance  Sit to Supine: Minimal assistance  Scooting: Minimal assistance    Transfers: Sit to Stand: Contact guard assistance  Stand to sit: Contact guard assistance  Bed to Chair: Maximum assistance (max to stand then minima assist and VCs to SPT to mat with platform WW.  ), Ambulation 1  Surface: level tile  Device: Platform Walker right  Assistance: Contact guard assistance  Quality of Gait: Decreased stance on R LE NBOS. Distance: 50ft   Comments: No WC follow. Pt able to ambulate to pre planned destination. , Stairs  # Steps : 1  Stairs Height: 6\"  Rails: Left ascending  Assistance: Minimal assistance, Moderate assistance  Comment: Initiated steps with +2 for safety. Assist to decreased adduction of R LE. Pt with increased fear. FIMS: Bed, Chair, Wheel Chair: 3 - Requires 25-49% assistance to transfer  Walk: 2 - Maximal Assistance Requires up to Maximal Assistance AND requires assistance of one person to walk/operate wheelchair between  feet (Patient performs 25-49% of locomotion effort or goes between  feet)  Distance Walked: 8  Wheel Chair: 2 - Maximal Assistance Requires up to [address] requires assistance of one person to walk/operate wheelchair between [address] in Wheel Chair: 0  Stairs: 0 - Activity Does not Occur ( 0 only for the admission assessment),  , Assessment: pt with god juliana to treatment. Vc to keep walker close to self.      Occupational therapy: FIMS:  Eatin - Feeds self with adaptive equipment/dentures and/or ATELECTASIS. Xr Knee Right (3 Views)  Result Date: 12/29/2017  CONCLUSION: RIGHT FEMORAL NECK FRACTURE WITH VARUS DEFORMITY. SMALL BIBASILAR DEPENDENT PULMONARY ATELECTASIS. Previous extensive, complex labs, notes and diagnostics reviewed and analyzed. ALLERGIES:    Allergies as of 01/02/2018 - Review Complete 01/02/2018   Allergen Reaction Noted    Cephalexin  12/19/2011      (please also verify by checking [facility])          Complex Physical Medicine & Rehab Issues Assess & Plan:   1. Severe abnormality of gait and mobility and impaired self-care and ADL's secondary to acute right hip fracture status post right ANN with severe right wrist strain/sprain. Functional and medical status reassessed regarding patients ability to participate in therapies and patient found to be able to participate in acute intensive comprehensive inpatient rehabilitation program including PT/OT to improve balance, ambulation, ADLs, and to improve the P/AROM. Therapeutic modifications regarding activities in therapies, place, amount of time per day and intensity of therapy made daily. In bed therapies or bedside therapies prn.   2. Bowel and Bladder dysfunction:  frequent toileting, ambulate to bathroom with assistance, check post void residuals. Check for C.difficile x1 if >2 loose stools in 24 hours, continue bowel & bladder program.  Monitor bowel and bladder function. Lactinex 2 PO every AC. MOM prn, Brown Bomb prn, Glycerin suppository prn, enema prn.  3. Severe right hip and right wrist pain generalized OA pain: reassess pain every shift and prior to and after each therapy session, give prn Tylenol and scheduled Percocet, modalities prn in therapy, Lidoderm, K-pad prn. I reviewed OARRS report, no significant opiates, no significant sedatives. Pain is better with scheduled Percocet, I will monitor for sedation.   4. Skin healing Right hip Steve numbness drainage and breakdown risk:  continue pressure relief program.  Daily skin exams and reports from nursing. Dry sterile dressing to the right hip every shift add CoQ10  5. Severe Fatigue due to nutritional and hydration deficiency:  continue to monitor I&Os, calorie counts prn, dietary consult prn. Add vitamin B12 CoQ10 and vitamin D  6. Acute episodic insomnia with situational adjustment disorder:  prn Ambien, monitor for day time sedation. 7. Falls risk elevated:  patient to use call light to get nursing assistance to get up, bed and chair alarm. 8. Elevated DVT risk: progressive activities in PT, continue prophylaxis OTONIEL hose, elevation and  Lovenox consider holding Lovenox if serosanguineous drainage continues . 9. Complex discharge planning:  Discharge date is set for 01/19/18 to home alone with home health PT, OT, RN, aide and . Team meeting Thursday to assess progress towards goals, discuss and address social, psychological and medical comorbidities and to address difficulties they may be having progressing in therapy. Patient and family education is in progress. The patient is to follow-up with their family physician after discharge. Complex Active General Medical Issues that complicate care Assess & Plan:    1. Fracture, hip, right, open type I or II, -status post right total hip arthroplasty - weightbearing as tolerated, up with assist, follow-up with orthopedic surgeon. 2.  History of Panic attacks - add recreational therapy, spiritual care, and music therapy, emotional support, adjust/add medications prn. Add adjustment to disability support group. 3.  Type 2 diabetes mellitus, hyperglycemia, Blood sugar 133 without Glucophage - Continue blood sugar checks BID, 1500-calorie ADA diet, adjust/add medications prn. Recent Labs      01/05/18 2011 01/06/18   0631  01/07/18   0646  01/07/18   0751  01/07/18   1558   POCGLU  159*  143*  133*  139*  179*   4.     Venous stasis dermatitis, Seborrheic keratosis,  Punctate keratitis

## 2018-01-08 NOTE — DISCHARGE SUMMARY
Discharge summary  This patient Elizabeth Ann was admitted to the hospital on 12/29/2017  after sustaining a hip fx. pt then underwent  Procedure(s) (LRB):  RIGHT HIP HEMIARTHROPLASTY (Right) without complications that morning. During the postoperative period,while in hospital, patient was medically managed by the hospitalist. Please see medial notes and H&P for patients additional diagnoses. Ortho agrees with all medical diagnoses and treatments while patient in hospital.    Hospital course  Patient tolerated surgical procedure well and there was no complications. Patient progressed adequtly through their recovery during hospital stay including PT and rehabilitation. DVT prophylaxis was implemented POD#1  Patient was then D/C on 1/2/2018 to Rehab  in stable condition. Patient was instructed on the use of pain medications, the signs and symptoms of infection, and was given our number to call should they have any questions or concerns following discharge.

## 2018-01-08 NOTE — PROGRESS NOTES
Patient was noted feeling dizzy in therapy. Patient was with her therapist. Therapist did ortho vitals and results are on the chart. Patient was returned to her room and placed on her bed. This nurse did manual ortho vitals. Results are on the chart with a heart rate of 68. Will call the hospitalist with this information.

## 2018-01-09 LAB
ANION GAP SERPL CALCULATED.3IONS-SCNC: 13 MEQ/L (ref 7–13)
BACTERIA: ABNORMAL /HPF
BASOPHILS ABSOLUTE: 0 K/UL (ref 0–0.2)
BASOPHILS RELATIVE PERCENT: 0.6 %
BILIRUBIN URINE: ABNORMAL
BLOOD, URINE: NEGATIVE
BUN BLDV-MCNC: 21 MG/DL (ref 8–23)
CALCIUM SERPL-MCNC: 9.6 MG/DL (ref 8.6–10.2)
CHLORIDE BLD-SCNC: 106 MEQ/L (ref 98–107)
CLARITY: ABNORMAL
CO2: 23 MEQ/L (ref 22–29)
COLOR: ABNORMAL
CREAT SERPL-MCNC: 0.73 MG/DL (ref 0.5–0.9)
EOSINOPHILS ABSOLUTE: 0.2 K/UL (ref 0–0.7)
EOSINOPHILS RELATIVE PERCENT: 3.1 %
EPITHELIAL CELLS, UA: ABNORMAL /HPF
GFR AFRICAN AMERICAN: >60
GFR NON-AFRICAN AMERICAN: >60
GLUCOSE BLD-MCNC: 127 MG/DL (ref 74–109)
GLUCOSE URINE: NEGATIVE MG/DL
HCT VFR BLD CALC: 24.4 % (ref 37–47)
HEMOGLOBIN: 8.4 G/DL (ref 12–16)
KETONES, URINE: NEGATIVE MG/DL
LEUKOCYTE ESTERASE, URINE: ABNORMAL
LYMPHOCYTES ABSOLUTE: 1.1 K/UL (ref 1–4.8)
LYMPHOCYTES RELATIVE PERCENT: 13.4 %
MCH RBC QN AUTO: 30.4 PG (ref 27–31.3)
MCHC RBC AUTO-ENTMCNC: 34.3 % (ref 33–37)
MCV RBC AUTO: 88.9 FL (ref 82–100)
MONOCYTES ABSOLUTE: 0.8 K/UL (ref 0.2–0.8)
MONOCYTES RELATIVE PERCENT: 10.1 %
MUCUS: PRESENT
NEUTROPHILS ABSOLUTE: 5.8 K/UL (ref 1.4–6.5)
NEUTROPHILS RELATIVE PERCENT: 72.8 %
NITRITE, URINE: NEGATIVE
PDW BLD-RTO: 14.3 % (ref 11.5–14.5)
PH UA: 5.5 (ref 5–9)
PLATELET # BLD: 406 K/UL (ref 130–400)
POTASSIUM SERPL-SCNC: 4.1 MEQ/L (ref 3.5–5.1)
PROTEIN UA: NEGATIVE MG/DL
RBC # BLD: 2.74 M/UL (ref 4.2–5.4)
RBC UA: ABNORMAL /HPF (ref 0–2)
RENAL EPITHELIAL, UA: ABNORMAL /HPF
SODIUM BLD-SCNC: 142 MEQ/L (ref 132–144)
SPECIFIC GRAVITY UA: 1.03 (ref 1–1.03)
URINE REFLEX TO CULTURE: YES
UROBILINOGEN, URINE: 1 E.U./DL
WBC # BLD: 7.9 K/UL (ref 4.8–10.8)
WBC UA: ABNORMAL /HPF (ref 0–5)

## 2018-01-09 PROCEDURE — 36415 COLL VENOUS BLD VENIPUNCTURE: CPT

## 2018-01-09 PROCEDURE — 6360000002 HC RX W HCPCS: Performed by: NURSE PRACTITIONER

## 2018-01-09 PROCEDURE — 87086 URINE CULTURE/COLONY COUNT: CPT

## 2018-01-09 PROCEDURE — 85025 COMPLETE CBC W/AUTO DIFF WBC: CPT

## 2018-01-09 PROCEDURE — 6370000000 HC RX 637 (ALT 250 FOR IP): Performed by: PHYSICAL MEDICINE & REHABILITATION

## 2018-01-09 PROCEDURE — 81001 URINALYSIS AUTO W/SCOPE: CPT

## 2018-01-09 PROCEDURE — 97116 GAIT TRAINING THERAPY: CPT

## 2018-01-09 PROCEDURE — 1180000000 HC REHAB R&B

## 2018-01-09 PROCEDURE — 86038 ANTINUCLEAR ANTIBODIES: CPT

## 2018-01-09 PROCEDURE — 97535 SELF CARE MNGMENT TRAINING: CPT

## 2018-01-09 PROCEDURE — 6370000000 HC RX 637 (ALT 250 FOR IP): Performed by: NURSE PRACTITIONER

## 2018-01-09 PROCEDURE — 80048 BASIC METABOLIC PNL TOTAL CA: CPT

## 2018-01-09 PROCEDURE — 97530 THERAPEUTIC ACTIVITIES: CPT

## 2018-01-09 PROCEDURE — 99232 SBSQ HOSP IP/OBS MODERATE 35: CPT | Performed by: PHYSICAL MEDICINE & REHABILITATION

## 2018-01-09 PROCEDURE — 6370000000 HC RX 637 (ALT 250 FOR IP): Performed by: INTERNAL MEDICINE

## 2018-01-09 PROCEDURE — 97110 THERAPEUTIC EXERCISES: CPT

## 2018-01-09 RX ORDER — ASPIRIN 81 MG/1
81 TABLET ORAL DAILY
Status: DISCONTINUED | OUTPATIENT
Start: 2018-01-09 | End: 2018-01-23 | Stop reason: HOSPADM

## 2018-01-09 RX ADMIN — ATORVASTATIN CALCIUM 10 MG: 10 TABLET, FILM COATED ORAL at 21:03

## 2018-01-09 RX ADMIN — DOCUSATE SODIUM 100 MG: 100 CAPSULE, LIQUID FILLED ORAL at 21:04

## 2018-01-09 RX ADMIN — Medication 100 MG: at 12:44

## 2018-01-09 RX ADMIN — ACETAMINOPHEN 650 MG: 325 TABLET, FILM COATED ORAL at 14:29

## 2018-01-09 RX ADMIN — ASPIRIN 81 MG: 81 TABLET, COATED ORAL at 09:44

## 2018-01-09 RX ADMIN — Medication 1 DROP: at 09:42

## 2018-01-09 RX ADMIN — DOCUSATE SODIUM 100 MG: 100 CAPSULE, LIQUID FILLED ORAL at 09:38

## 2018-01-09 RX ADMIN — ENOXAPARIN SODIUM 40 MG: 40 INJECTION, SOLUTION INTRAVENOUS; SUBCUTANEOUS at 09:35

## 2018-01-09 RX ADMIN — Medication 1 DROP: at 21:04

## 2018-01-09 RX ADMIN — Medication 100 MG: at 09:37

## 2018-01-09 RX ADMIN — OXYCODONE HYDROCHLORIDE AND ACETAMINOPHEN 1 TABLET: 5; 325 TABLET ORAL at 09:37

## 2018-01-09 ASSESSMENT — PAIN SCALES - GENERAL
PAINLEVEL_OUTOF10: 4
PAINLEVEL_OUTOF10: 0
PAINLEVEL_OUTOF10: 0
PAINLEVEL_OUTOF10: 3

## 2018-01-09 ASSESSMENT — PAIN DESCRIPTION - LOCATION: LOCATION: ARM

## 2018-01-09 ASSESSMENT — PAIN DESCRIPTION - PAIN TYPE: TYPE: ACUTE PAIN

## 2018-01-09 ASSESSMENT — PAIN DESCRIPTION - ORIENTATION: ORIENTATION: RIGHT

## 2018-01-09 NOTE — PROGRESS NOTES
Occupational Therapy  Facility/Department: [facility]  Daily Treatment Note  NAME: Lashay Kathleen  : 1935  MRN: [id number]    Date of Service: 2018    Patient Diagnosis(es): There were no encounter diagnoses. has a past medical history of Abnormality of gait and mobility; Chronic kidney disease; Closed fracture of distal end of left radius; CVA (cerebral vascular accident) ([facility] [address] Essential hypertension; Fracture of hip, closed, right, [facility] H/O total hip arthroplasty, right; History of TIA (transient ischemic attack); Hypercholesteremia; Hyperlipidemia; Obesity (BMI 30.0-34.9); Panic attacks; Postoperative anemia; Type 2 diabetes mellitus without complication ([facility] Urinary incontinence; Venous stasis dermatitis; and Vitamin D deficiency. has a past surgical history that includes Appendectomy (); Upper gastrointestinal endoscopy (03); Colonoscopy; eye surgery; fracture surgery; and partial hip replacement (Right, 2017).     Restrictions  Restrictions/Precautions  Restrictions/Precautions: Fall Risk  Lower Extremity Weight Bearing Restrictions  Right Lower Extremity Weight Bearing: Weight Bearing As Tolerated  Upper Extremity Weight Bearing Restrictions  Right Upper Extremity Weight Bearing: Weight Bearing As Tolerated  Left Upper Extremity Weight Bearing: Weight Bearing As Tolerated  Other: RUE Splint for comfort during ambulation per Dr. Dalton Cruz; x-ray negative for fx  Position Activity Restriction  Hip Precautions: Posterior hip precautions  Other position/activity restrictions: Pt able to recall 2/3/ hip precations  Subjective   General  Chart Reviewed: Yes  Referring Practitioner: Dr. Dalton Cruz  Diagnosis: Right femoral neck fx s/p fall with R ANN on 17 with impaired mobility  Pre Treatment Pain Screening  Pain at present: 3  Scale Used: Numeric Score  Intervention List: Patient able to continue with treatment  Pain Assessment  Patient Currently in Pain: No  Pain Assessment: 0-10  Pain Level: 0  Pain Type: Acute pain  Pain Location: Arm  Pain Orientation: Right  Vital Signs  Patient Currently in Pain: No   Orientation     Objective      Pt. scheduled for AM ADL; pt. declined a shower and completed partial ADL as below at sinkside. ADL  UE Bathing: Setup  LE Bathing: None  UE Dressing: Minimal assistance  LE Dressing: None  Additional Comments: Pt. stated that she had been bathed for lower body earlier to don pants for PT and prefers not to shower again 2° her skin dries out. Pt. preferred to dress only upper body and leave lower body dressed. Following ADL, pt. engaged in repetitive reaching tasks to promote increased sequencing and UE endurance. Pt. was able to place wooden puzzle pieces onto keyhole puzzle with  moderate difficulty and increased time. Pt. tolerated 1lb weight on L wrist for 30 minutes. Pt. also performed tabletop reaching, placing/removing 5/5 grades of graded clothespins without difficulty. Assessment      Discharge Recommendations: Continue to assess pending progress  Activity Tolerance  Activity Tolerance: Patient Tolerated treatment well  Safety Devices  Safety Devices in place: Yes  Type of devices: All fall risk precautions in place       Discharge Recommendations:  Continue to assess pending progress     Plan   Plan  Times per week: 5-7x/week, 15-60 minutes/day  Times per day: Daily  Plan weeks: 1 1/2-2  Current Treatment Recommendations: Strengthening, Balance Training, Endurance Training, Self-Care / ADL, Safety Education & Training, Patient/Caregiver Education & Training, Equipment Evaluation, Education, & procurement, Home Management Training, Cognitive/Perceptual Training  Plan Comment: Continue POC  G-Code     OutComes Score  AM-PAC Score  Goals  Patient Goals   Patient goals :  \"Get better and go home\"       Therapy Time   Individual Concurrent Group Co-treatment   Time In 1030         Time Out 1130         Minutes 60

## 2018-01-09 NOTE — PROGRESS NOTES
Physical Therapy Rehab Treatment Note  Facility/Department: [facility]  Room: O4/P568-59       NAME: Katina See  : 1935 (20 y.o.)  MRN: [id number]  CODE STATUS: Full Code    Date of Service: 2018     Rest rictions:  Restrictions/Precautions: Fall Risk  Body mass index is 33.05 kg/m². SUBJECTIVE: Subjective: Pt states she had a nap and it helped. Pain Screening  Patient Currently in Pain: No     Post Treatment Pain Screenin/10     OBJECTIVE:     Bed mobility  Supine to Sit: Stand by assistance (hospital bed with HOB elevated.  )  Sit to Supine: Minimal assistance (for R LE)  Transfers  Sit to Stand: Stand by assistance  Stand to sit: Stand by assistance  Comment: VCs for set up and to improve technique. Ambulation  Ambulation?: Yes  Ambulation 1  Surface: carpet  Device: Platform Walker right  Assistance: Stand by assistance  Quality of Gait: Decreased stance on R LE. Standing rest break x1 d/t fatigue. Distance: 140ft    Stairs/Curb  Stairs?: Yes   Stairs  # Steps : 4  Stairs Height: 6\"  Rails: Bilateral.  Pt able to tolerate resting R hand on rail. Activity Tolerance  Activity Tolerance: Patient Tolerated treatment well    Exercises  Standing R march x20  Standing heel raises x20     ASSESSMENT/COMMENTS:  Assessment: Improving functional ability with decreased assistance needed. Pt able to progress to 4 steps up and down and increase gait distance.       PLAN OF CARE/Safety:   Safety Devices  Type of devices: Chair alarm in place;Gait belt      Therapy Time:   Individual   Time In 1445   Time Out 1530   Minutes 45     Minutes:      Transfer/Bed mobility training:10      Gait trainin      Neuro re education:     Therapeutic ex:Haider Beck PTA, 18 at 3:32 PM

## 2018-01-09 NOTE — PROGRESS NOTES
murmurs. Abdomen:  Soft, non-tender, no enlargement of liver or spleen. Extremities:  significant lower extremity edema and right hip tenderness. Right wrist is less tender   Skin:    Significant servicing demonstration of the right hip incision mild bilateral upper extremity bruising    Rehabilitation:  Physical therapy: FIMS:  Bed Mobility: Supine to Sit: Minimal assistance  Sit to Supine: Minimal assistance  Scooting: Minimal assistance    Transfers: Sit to Stand: Stand by assistance, Contact guard assistance, Minimal Assistance (assist level varries. Increased time and effort. VCs to maintain hip prec.  )  Stand to sit: Stand by assistance  Bed to Chair: Maximum assistance (max to stand then minima assist and VCs to SPT to mat with platform WW.  ), Ambulation 1  Surface: level tile, carpet  Device: Platform Walker right  Assistance: Stand by assistance  Quality of Gait: Decreased stance on R LE.  VCs for hip prec and to decrease twisting when turning to L. Improved ability to manage WW with change of direction. Distance: 80ft  Comments: No WC follow. Pt able to ambulate to pre planned destination. , Stairs  # Steps : 1  Stairs Height: 6\"  Rails: Left ascending  Assistance: Minimal assistance, Moderate assistance  Comment: Initiated steps with +2 for safety. Assist to decreased adduction of R LE. Pt with increased fear.       FIMS: Bed, Chair, Wheel Chair: 4 - Requires steadying assistance only <25% assist  and/or requires assist with one leg only  Walk: 2 - Maximal Assistance Requires up to [address] requires assistance of one person to walk/operate wheelchair between  feet (Patient performs 25-49% of locomotion effort or goes between  feet)  Distance Walked: 80ft  Wheel Chair: 2 - [address] up to [address] requires assistance of one person to walk/operate wheelchair between [address] in [address]: 0  Stairs: 0 - Monitor bowel and bladder function. Lactinex 2 PO every AC. MOM prn, Brown Bomb prn, Glycerin suppository prn, enema prn.  3. Severe right hip and right wrist pain generalized OA pain: reassess pain every shift and prior to and after each therapy session, give prn Tylenol and scheduled Percocet, modalities prn in therapy, Lidoderm, K-pad prn. I reviewed OARRS report, no significant opiates, no significant sedatives. Pain is better with scheduled Percocet, I will monitor for sedation. 4. Skin healing Right hip [place] numbness drainage and breakdown risk:  continue pressure relief program.  Daily skin exams and reports from nursing. Dry sterile dressing to the right hip every shift add CoQ10  5. Severe Fatigue due to nutritional and hydration deficiency:  continue to monitor I&Os, calorie counts prn, dietary consult prn. Add vitamin B12 CoQ10 and vitamin D  6. Acute episodic insomnia with situational adjustment disorder:  prn Ambien, monitor for day time sedation. 7. Falls risk elevated:  patient to use call light to get nursing assistance to get up, bed and chair alarm. 8. Elevated DVT risk: progressive activities in PT, continue prophylaxis OTONIEL hose, elevation and  Lovenox consider holding Lovenox if serosanguineous drainage continues . 9. Complex discharge planning:  Discharge date is set for 01/19/18 to home alone with home health PT, OT, RN, aide and . Team meeting Thursday to assess progress towards goals, discuss and address social, psychological and medical comorbidities and to address difficulties they may be having progressing in therapy. Patient and family education is in progress. The patient is to follow-up with their family physician after discharge. Complex Active General Medical Issues that complicate care Assess & Plan:    1.  Fracture, hip, right, open type I or II, -status post right total hip arthroplasty - weightbearing as tolerated, up with assist, follow-up with orthopedic surgeon. 2.  History of Panic attacks - add recreational therapy, spiritual care, and music therapy, emotional support, adjust/add medications prn. Add adjustment to disability support group. 3.  Type 2 diabetes mellitus, hyperglycemia, Blood sugar 133 without Glucophage - Continue blood sugar checks BID, 1500-calorie ADA diet, adjust/add medications prn. Recent Labs      01/07/18   0646  01/07/18   0751  01/07/18   1558   POCGLU  133*  139*  179*   4. Venous stasis dermatitis, Seborrheic keratosis,  Punctate keratitis - add ET mix bilateral lower extremities and protect her heels by placing them off the bed  4. 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 Check orthostatic BP. I prescribed abdominal binder, OTONIEL hose. 5.   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 The prognosis for a subependymoma is better than for most ependymal tumors, and is considered a grade I tumor in the [address] [facility]) classification. 6.   Severe Right UE pain - no fracture, healed Closed fracture of distal end of left radius now with acute right wrist sprain - upgrade to WBAT, right wrist splint for comfort only. Add lidocaine patch as well as Voltaren gel. Right wrist needs Ace Wrap.   7.   Vitamin D deficiency and osteoporotic fractures - supplement and recheck as outpatient. 8.  Progressive Postoperative anemia -likely secondary to [place] numbness drainage at incision site recheck CBC. Monitor vital signs, check H&H prn. I prescribed iron. Discontinue daily ferritin levels  9. Chronic kidney disease - recheck BMP, limit toxic medications. Encourage hydration.    10.   Urinary incontinence -

## 2018-01-09 NOTE — PROGRESS NOTES
[facility]   Acute  Rehabilitation  MUSIC THERAPY      Date:  1/9/2018        Patient Name: Denisse Hameed       MRN: [id number]        YOB: 1935 (80 y.o.)       Gender: female  Diagnosis: Right femoral neck fx s/p fall with R ANN on 12/30/17 with impaired mobility  Referring Practitioner: Dr. Willis Hermosillo    RESTRICTIONS/PRECAUTIONS:  Restrictions/Precautions: Fall Risk  Vision: Within Functional Limits  Hearing: Exceptions to [facility]  Hearing Exceptions: Hard of hearing/hearing concerns    Time of Session: 2:40pm - 3:00pm     PAIN RATING BEFORE MUSIC THERAPY SESSION:   [x] No  [] Yes        Location: n/a    Pain Rating: n/a    Comment(s): n/a    ANXIETY RATING BEFORE MUSIC THERAPY SESSION:  [x] No  [] Yes         Anxiety Rating: n/a    Comment(s): n/a    SUBJECTIVE: \"Music therapy is just wonderful! \"     OBJECTIVE:        [x] To Improve Mood     [x] To Increase Social Well-Being  [] To Increase Focus   [x] To Increase Emotional Well-Being  [] To Increase Eye Contact    [] To Increase Spiritual Well-Being   [] To Decrease Anxiety   [x] To Increase Relaxation   [] To Decrease Pain    [] To Increase Communication  [] To Increase Movement to Music       MUSIC INTERVENTION PROVIDED:     [x] Live Music on Voice  [] Recorded Music   [x] Live Music on Guitar  [x] Discussion Related to Music   [] Live Music on Q-chord  [x] Discussion Related to Pt Experience   [] Live Music on Percussion      PARTICIPATION LEVEL OF PATIENT:     [x] Active with discussion   [] Passive with discussion   [x] Active with singing    [] Passive with singing   [] Active with instrument playing  [] Passive with instrument playing   [x] Actively listening to music   [] Passively listening to music.          OUTCOMES OBSERVED:      [x] Improved Mood   [x] Increased Social Well-Being  [] Increased Focus   [x] Increased Emotional Well-Being  [] Increased Eye Contact    [] Increased Spiritual Well-Being   [] Decreased Anxiety   [x]

## 2018-01-09 NOTE — CONSULTS
enlarged, symmetric, no tenderness/mass/nodules  Respiratory: clear to auscultation bilaterally  Cardiovascular: regular rate and rhythm, S1, S2 normal, no murmur, click, rub or gallop  Gastrointestinal: soft, non-tender; bowel sounds normal; no masses,  no organomegaly  Genitourinary: Deferred  Musculoskeletal:extremities normal, atraumatic, no cyanosis or edema. Good capillary refill all extremities. Neurologic: Mental status AAOx3 No facial asymmetry or droop. Normal muscle strength b/l. Psychiatric: Appropriate mood and affect. Good insight and judgement  Hematologic: No obvious bruising or bleeding    No results for input(s): WBC, HGB, PLT in the last 72 hours. Recent Labs      01/09/18   0544   NA  142   K  4.1   CL  106   CO2  23   BUN  21   CREATININE  0.73   GLUCOSE  127*       Assessment and Plan   1. Right total hip status post ANN currently undergoing physical therapy: Pain management, DVT prophylaxis, PT and OT as per primary physician. 2. Type 2 diabetes: Glucose levels relatively well-controlled at this time  3. History of TIA in the past: Continue statin recommend adding antiplatelet agent A1 milligram aspirin daily  4. Dizziness lightheadedness: CBC BMP pending today. Encourage oral intake of water initial orthostatic vitals negative. Patient has been receiving scheduled Percocet however does feel that Tylenol is controlling her pain. This point recommend decreasing narcotic analgesia as tolerated this may be contributing to dizziness and lightheadedness.     DVT prophylaxis with Lovenox    Patient Active Problem List   Diagnosis Code    Panic attacks F41.0    Type 2 diabetes mellitus ([address] E11.9    Venous stasis dermatitis I87.2    Seborrheic keratosis L82.1    Essential hypertension I10    Subependymoma ([address] D43.2    History of cataract extraction Z98.49    Blepharitis of right eye H01.003    Punctate keratitis H16.149    Closed fracture of distal end of left radius S52.502A   

## 2018-01-10 PROCEDURE — 6370000000 HC RX 637 (ALT 250 FOR IP): Performed by: NURSE PRACTITIONER

## 2018-01-10 PROCEDURE — 6370000000 HC RX 637 (ALT 250 FOR IP): Performed by: PHYSICAL MEDICINE & REHABILITATION

## 2018-01-10 PROCEDURE — 97127 HC OT THER IVNTJ W/FOCUS COG FUNCJ: CPT

## 2018-01-10 PROCEDURE — 97110 THERAPEUTIC EXERCISES: CPT

## 2018-01-10 PROCEDURE — 97530 THERAPEUTIC ACTIVITIES: CPT

## 2018-01-10 PROCEDURE — 99232 SBSQ HOSP IP/OBS MODERATE 35: CPT | Performed by: PHYSICAL MEDICINE & REHABILITATION

## 2018-01-10 PROCEDURE — 6370000000 HC RX 637 (ALT 250 FOR IP): Performed by: INTERNAL MEDICINE

## 2018-01-10 PROCEDURE — 97535 SELF CARE MNGMENT TRAINING: CPT

## 2018-01-10 PROCEDURE — 97116 GAIT TRAINING THERAPY: CPT

## 2018-01-10 PROCEDURE — 6360000002 HC RX W HCPCS: Performed by: PHYSICAL MEDICINE & REHABILITATION

## 2018-01-10 PROCEDURE — 6360000002 HC RX W HCPCS: Performed by: NURSE PRACTITIONER

## 2018-01-10 PROCEDURE — 1180000000 HC REHAB R&B

## 2018-01-10 RX ORDER — POLYETHYLENE GLYCOL 3350 17 G/17G
17 POWDER, FOR SOLUTION ORAL DAILY
Status: DISCONTINUED | OUTPATIENT
Start: 2018-01-10 | End: 2018-01-15

## 2018-01-10 RX ADMIN — Medication 100 MG: at 08:37

## 2018-01-10 RX ADMIN — ASPIRIN 81 MG: 81 TABLET, COATED ORAL at 08:49

## 2018-01-10 RX ADMIN — CYANOCOBALAMIN 1000 MCG: 1000 INJECTION, SOLUTION INTRAMUSCULAR at 12:09

## 2018-01-10 RX ADMIN — DOCUSATE SODIUM 100 MG: 100 CAPSULE, LIQUID FILLED ORAL at 20:06

## 2018-01-10 RX ADMIN — ENOXAPARIN SODIUM 40 MG: 40 INJECTION, SOLUTION INTRAVENOUS; SUBCUTANEOUS at 08:36

## 2018-01-10 RX ADMIN — DOCUSATE SODIUM 100 MG: 100 CAPSULE, LIQUID FILLED ORAL at 08:37

## 2018-01-10 RX ADMIN — POLYETHYLENE GLYCOL 3350 17 G: 17 POWDER, FOR SOLUTION ORAL at 16:28

## 2018-01-10 RX ADMIN — ACETAMINOPHEN 650 MG: 325 TABLET, FILM COATED ORAL at 08:38

## 2018-01-10 RX ADMIN — Medication 100 MG: at 12:09

## 2018-01-10 RX ADMIN — ATORVASTATIN CALCIUM 10 MG: 10 TABLET, FILM COATED ORAL at 20:06

## 2018-01-10 ASSESSMENT — PAIN SCALES - GENERAL
PAINLEVEL_OUTOF10: 0
PAINLEVEL_OUTOF10: 3

## 2018-01-10 NOTE — PROGRESS NOTES
[facility]   Acute  Rehabilitation  RECREATIONAL THERAPY    Recreation Therapy Assessment    Date:  1/10/2018        Patient Name: Ying Roman       MRN: [id number]        YOB: 1935 (80 y.o.)       Gender: female  Diagnosis: Right femoral neck fx s/p fall with R ANN on 12/30/17 with impaired mobility  Referring Practitioner: Dr. Geoff Shaw    RESTRICTIONS/PRECAUTIONS:  Restrictions/Precautions: Fall Risk  Vision: Within Functional Limits  Hearing: Exceptions to [facility] Exceptions: Hard of hearing/hearing concerns     [x] Recreation Therapy referral received. [x] Chart reviewed         [x] Patient interviewed       [x] Recreation Therapy services discussed with patient. Patient reports pain is a  [x]new current issue  []chronic issue  []Not an issue at the moment []other:      Ability to provide information regarding leisure and recreation interests:   [x]No issues BUT WAS SLEEPY EYED              []Issues with cognition (memory, confusion)              []Issues with communication (speech, hearing)              []Issues with mood    Patient:    [] States reason for hospitalization does not hinder all of patient's leisure and recreational pursuits. [x] Is aware and engages in leisure and recreational pursuits which include the following recreational domains:   []Physical[x]Social[]Creative [x]Community-based [x] Solitaire   [x] Has an interest in:  [x]  Pet Therapy  [x] Music Therapy  [] Art Therapy [] Recreation Therapy groups    [] Educated on community, recreational, support group services/resources i.e.: [facility]   [x] Plans on resuming some of his/her previous leisure and recreational pursuits after hospitalization. [x] Educated on relaxation techniques   [x]Provided with leisure education   [x] Educated ON coping skills.   [] Declines Recreation Therapy services     Interests: Pt is quite social. She has a group of friends she plays [employer] with. She also enjoys other card games. She is out of the house going shopping, Spiritism, out to lunch. The patient does have indoor/outdoor plants. She occasionally reads and writes letter to family/friends in [facility]. In the past, she crocheted and knit. Observed/noted emotions:    [] Homesick  [] Anger   [] Depressed   [] Anxious    [x] Cooperative   [] Isolated   [x] Pleasant       [] Agitated       Patient provided with the following accessible and independently used recreation/leisure resources when in hospital room:   [x] 5 days week large print orientation/puzzle activity handout  [] Word searches, crossword puzzles  [] Creative art independent use kit  [] Journaling kit  [] Creative writing kit  [] Deck of Cards  []     Recommendations:   Recreation Therapy services offered to all [facility] inpatients:  [x]Recommended  []Not recommended secondary to:     Comments: Pt declined extra leisure kit for in room use as well as a greeting card making activity.      Electronically signed by: Lars Olea  Date: 1/10/2018   Electronically signed by Lars Olea on 1/10/2018 at 1:53 PM

## 2018-01-10 NOTE — PROGRESS NOTES
Occupational Therapy  Facility/Department: [facility]  Daily Treatment Note  NAME: Madeline Knox  : 1935  MRN: [id number]    Date of Service: 1/10/2018    Patient Diagnosis(es): There were no encounter diagnoses. has a past medical history of Abnormality of gait and mobility; Chronic kidney disease; Closed fracture of distal end of left radius; CVA (cerebral vascular accident) ([facility] [address] Essential hypertension; Fracture of hip, closed, right, initial encounter ([facility] H/O total hip arthroplasty, right; History of TIA (transient ischemic attack); Hypercholesteremia; Hyperlipidemia; Obesity (BMI 30.0-34.9); Panic attacks; Postoperative anemia; Type 2 diabetes mellitus without complication ([facility] Urinary incontinence; Venous stasis dermatitis; and Vitamin D deficiency. has a past surgical history that includes Appendectomy (); Upper gastrointestinal endoscopy (03); Colonoscopy; eye surgery; fracture surgery; and partial hip replacement (Right, 2017).     Restrictions  Restrictions/Precautions  Restrictions/Precautions: Fall Risk  Lower Extremity Weight Bearing Restrictions  Right Lower Extremity Weight Bearing: Weight Bearing As Tolerated  Upper Extremity Weight Bearing Restrictions  Right Upper Extremity Weight Bearing: Weight Bearing As Tolerated  Left Upper Extremity Weight Bearing: Weight Bearing As Tolerated  Other: RUE Splint for comfort during ambulation per Dr. Ej Browne; x-ray negative for fx  Position Activity Restriction  Hip Precautions: Posterior hip precautions  Other position/activity restrictions: Pt able to recall 2/3/ hip precations  Subjective   General  Chart Reviewed: Yes  Referring Practitioner: Dr. Ej Browne  Diagnosis: Right femoral neck fx s/p fall with R ANN on 17 with impaired mobility  Pre Treatment Pain Screening  Pain at present: 0  Scale Used: Numeric Score  Intervention List: Patient able to continue with treatment  Pain Assessment  Patient Currently in Pain:

## 2018-01-10 NOTE — PROGRESS NOTES
Physical Therapy Rehab Treatment Note  [facility]  [facility]       NAME: Ge Staff  : 1935 (20 y.o.)  MRN: [id number]  CODE STATUS: Full Code    Date of Service: 1/10/2018  General Comment  Comments: Pt delayed to therapy d/t bathroom use. pt with nsg in bathroom. Restrictions:  Restrictions/Precautions: Fall Risk  Body mass index is 33.05 kg/m². SUBJECTIVE: Subjective: Pt states she was ready for therapy but she was having problems with her bowels and there was no way she could come. Pt states going to the bathroom this morning took alot out of her. Pain Screening  Patient Currently in Pain: No     Post Treatment Pain Screenin/10     OBJECTIVE:      Bed mobility NT AM d/t time. Transfers  Sit to Stand: Stand by assistance  Stand to sit: Stand by assistance    Ambulation  Ambulation?: Yes  Ambulation 1  Surface: carpet  Device: Platform Walker right  Assistance: Stand by assistance  Quality of Gait: Focused gait training on improving step length and heel/toe pattern. Fatigued after 125ft. Distance: 125ft  Stairs/Curb  Stairs?: No (Pt requests to try in PM d/t increased fatigue this AM.  )      Activity Tolerance  Activity Tolerance: Patient Tolerated treatment well    Exercises  Hip Flexion: x20 within hip prec. Hip Abduction: seated x20 with light manual resistance  Knee Long Arc Quad: x 20  Other exercises  Other exercises 1: hip add with ball x20 seated     ASSESSMENT/COMMENTS:  Assessment: Pt with improved sit to stand quality and consistancy requiring fewer cues. PLAN OF CARE/Safety:   Plan Comment: cont current POC.   Complete stairs and bed mobility in PM.      Therapy Time:   Individual   Time In 935   Time Out 1000   Minutes 25     Minutes:      Transfer/Bed mobility trainin      Gait training:10      Neuro re education:     Therapeutic ex:10      Macon Bence, PTA, 01/10/18 at 9:53 AM

## 2018-01-10 NOTE — PROGRESS NOTES
therapy made daily. In bed therapies or bedside therapies prn.   2. Bowel severe constipation and Bladder dysfunction:  frequent toileting, ambulate to bathroom with assistance, check post void residuals. Check for C.difficile x1 if >2 loose stools in 24 hours, continue bowel & bladder program.  Monitor bowel and bladder function. Lactinex 2 PO every AC. MOM prn, Brown Bomb prn, Glycerin suppository prn, enema prn. Status post digital disimpaction. I prescribed Miralax daily. 3. Severe right hip and right wrist pain generalized OA pain: reassess pain every shift and prior to and after each therapy session, give prn Tylenol and scheduled Percocet, modalities prn in therapy, Lidoderm, K-pad prn. I reviewed OARRS report, no significant opiates, no significant sedatives. Pain is better with scheduled Percocet, I will monitor for sedation. 4. Skin healing Right hip [place] numbness drainage and breakdown risk:  continue pressure relief program.  Daily skin exams and reports from nursing. Dry sterile dressing to the right hip every shift add CoQ10  5. Severe Fatigue due to nutritional and hydration deficiency:  continue to monitor I&Os, calorie counts prn, dietary consult prn. Add vitamin B12 CoQ10 and vitamin D  6. Acute episodic insomnia with situational adjustment disorder:  prn Ambien, monitor for day time sedation. 7. Falls risk elevated:  patient to use call light to get nursing assistance to get up, bed and chair alarm. 8. Elevated DVT risk: progressive activities in PT, continue prophylaxis OTONIEL hose, elevation and  Lovenox consider holding Lovenox if serosanguineous drainage continues . 9. Complex discharge planning:  Discharge date is set for 01/19/18 to home alone with home health PT, OT, RN, aide and . I hope to have her using a regular wheeled walker at discharge.   Team meeting Thursday to assess progress towards goals, discuss and address social, psychological and medical

## 2018-01-11 ENCOUNTER — APPOINTMENT (OUTPATIENT)
Dept: ULTRASOUND IMAGING | Age: 83
DRG: 560 | End: 2018-01-11
Attending: PHYSICAL MEDICINE & REHABILITATION
Payer: MEDICARE

## 2018-01-11 LAB — URINE CULTURE, ROUTINE: NORMAL

## 2018-01-11 PROCEDURE — 92507 TX SP LANG VOICE COMM INDIV: CPT

## 2018-01-11 PROCEDURE — 1180000000 HC REHAB R&B

## 2018-01-11 PROCEDURE — 6370000000 HC RX 637 (ALT 250 FOR IP): Performed by: NURSE PRACTITIONER

## 2018-01-11 PROCEDURE — 6370000000 HC RX 637 (ALT 250 FOR IP): Performed by: INTERNAL MEDICINE

## 2018-01-11 PROCEDURE — 97127 HC OT THER IVNTJ W/FOCUS COG FUNCJ: CPT

## 2018-01-11 PROCEDURE — 97530 THERAPEUTIC ACTIVITIES: CPT

## 2018-01-11 PROCEDURE — 97116 GAIT TRAINING THERAPY: CPT

## 2018-01-11 PROCEDURE — 99233 SBSQ HOSP IP/OBS HIGH 50: CPT | Performed by: PHYSICAL MEDICINE & REHABILITATION

## 2018-01-11 PROCEDURE — 97110 THERAPEUTIC EXERCISES: CPT

## 2018-01-11 PROCEDURE — 6370000000 HC RX 637 (ALT 250 FOR IP): Performed by: PHYSICAL MEDICINE & REHABILITATION

## 2018-01-11 PROCEDURE — 97535 SELF CARE MNGMENT TRAINING: CPT

## 2018-01-11 PROCEDURE — 93971 EXTREMITY STUDY: CPT

## 2018-01-11 PROCEDURE — 6360000002 HC RX W HCPCS: Performed by: NURSE PRACTITIONER

## 2018-01-11 RX ADMIN — Medication 100 MG: at 09:51

## 2018-01-11 RX ADMIN — DOCUSATE SODIUM 100 MG: 100 CAPSULE, LIQUID FILLED ORAL at 09:51

## 2018-01-11 RX ADMIN — POLYETHYLENE GLYCOL 3350 17 G: 17 POWDER, FOR SOLUTION ORAL at 17:05

## 2018-01-11 RX ADMIN — ATORVASTATIN CALCIUM 10 MG: 10 TABLET, FILM COATED ORAL at 21:01

## 2018-01-11 RX ADMIN — Medication 100 MG: at 12:06

## 2018-01-11 RX ADMIN — ASPIRIN 81 MG: 81 TABLET, COATED ORAL at 09:51

## 2018-01-11 RX ADMIN — DOCUSATE SODIUM 100 MG: 100 CAPSULE, LIQUID FILLED ORAL at 21:01

## 2018-01-11 RX ADMIN — ACETAMINOPHEN 650 MG: 325 TABLET, FILM COATED ORAL at 21:01

## 2018-01-11 RX ADMIN — ENOXAPARIN SODIUM 40 MG: 40 INJECTION, SOLUTION INTRAVENOUS; SUBCUTANEOUS at 09:51

## 2018-01-11 ASSESSMENT — PAIN SCALES - GENERAL
PAINLEVEL_OUTOF10: 0

## 2018-01-11 NOTE — PROGRESS NOTES
Occupational Therapy  Facility/Department: [facility]  Daily Treatment Note  NAME: Juan Russo  : 1935  MRN: [id number]    Date of Service: 2018    Patient Diagnosis(es): There were no encounter diagnoses. has a past medical history of Abnormality of gait and mobility; Chronic kidney disease; Closed fracture of distal end of left radius; CVA (cerebral vascular accident) ([facility] [address] [facility] Essential hypertension; Fracture of hip, closed, right, [facility] H/O total hip arthroplasty, right; History of TIA (transient ischemic attack); Hypercholesteremia; Hyperlipidemia; Obesity (BMI 30.0-34.9); Panic attacks; Postoperative anemia; Type 2 diabetes mellitus without complication ([facility] Urinary incontinence; Venous stasis dermatitis; and Vitamin D deficiency. has a past surgical history that includes Appendectomy (); Upper gastrointestinal endoscopy (03); Colonoscopy; eye surgery; fracture surgery; and partial hip replacement (Right, 2017).     Restrictions  Restrictions/Precautions  Restrictions/Precautions: Fall Risk  Lower Extremity Weight Bearing Restrictions  Right Lower Extremity Weight Bearing: Weight Bearing As Tolerated  Upper Extremity Weight Bearing Restrictions  Right Upper Extremity Weight Bearing: Weight Bearing As Tolerated  Left Upper Extremity Weight Bearing: Weight Bearing As Tolerated  Other: RUE Splint for comfort during ambulation per Dr. Kendra Singh; x-ray negative for fx  Position Activity Restriction  Hip Precautions: Posterior hip precautions  Other position/activity restrictions: Pt able to recall 2/3/ hip precations  Subjective   General  Chart Reviewed: Yes  Referring Practitioner: Dr. Kendra Singh  Diagnosis: Right femoral neck fx s/p fall with R ANN on 17 with impaired mobility  Pre Treatment Pain Screening  Pain at present: 0  Scale Used: Numeric Score  Intervention List: Patient able to continue with treatment  Pain Assessment  Patient Currently in Pain: No  Pain Assessment: 0-10  Pain Level: 0  Vital Signs  Patient Currently in Pain: No   Orientation     Objective    Pt. completed repetitive overhead reaching task with alternating UE, placing/removing small dowels and rings on vertical renaldo. Pt. was able to place dowels and rings without difficulty, but did not remove them without cues. Pt. had increased difficulty sequencing removal of dowels and rings, despite repeated verbal and visual cues for technique. Cognition  Cognition Comment: Pt. engaged in cognitive activity copying parquetry design. Pt. had max difficulty copying design despite cues and encouragement from therapist. Pt. was able to complete 1 design with max verbal cues and increased time. Pt. frequently placed pieces on the wrong side of neighboring pieces, or rotated incorrectly, despite therapist cues to reorient pieces. Pt. became visibly frustrated at end of task and states \"this is going to keep me up all night! \"     Assessment      Discharge Recommendations: Continue to assess pending progress  Activity Tolerance  Activity Tolerance: Patient Tolerated treatment well  Safety Devices  Safety Devices in place: Yes  Type of devices: All fall risk precautions in place       Discharge Recommendations:  Continue to assess pending progress     Plan   Plan  Times per week: 5-7x/week, 15-60 minutes/day  Times per day: Daily  Plan weeks: 1 1/2-2  Current Treatment Recommendations: Strengthening, Balance Training, Endurance Training, Self-Care / ADL, Safety Education & Training, Patient/Caregiver Education & Training, Equipment Evaluation, Education, & procurement, Home Management Training, Cognitive/Perceptual Training  Plan Comment: Continue POC  G-Code     OutComes Score  AM-PAC Score  Goals  Patient Goals   Patient goals :  \"Get better and go home\"       Therapy Time   Individual Concurrent Group Co-treatment   Time In [facility]         Time Out 1600         Minutes Yue Cheney9,

## 2018-01-11 NOTE — PROGRESS NOTES
[facility]  Speech Language/Pathology  Rehab Daily Note                  Kade Garcia  1/11/2018    Rehab Dx/Hx: Fracture, hip, right, open type I or II, initial encounter ([facility] [address] [S72.001B]    Precautions: falls    ST Dx: [] Aphasia  [] Dysarthria  [] Apraxia   [] Oropharyngeal dysphagia              [] Cognitive Impairment  [] Other:     Date of Admit: 1/2/2018  Room #: O871/G716-03    Time in: 6382 Time out: 1150    Subjective:  Behavior: [x] Alert  [x] Cooperative  []  Pleasant  [] Confused  [] Agitated                                          [] Uncooperative  [] Distractible [] Motivated  [] Self-Limiting [] Anxious          [] Other:    Endurance:  [x] Adequate for participation in SLP sessions  [] Reduced overall              [] Lethargic  [] Other:              Interventions used this date:  [] Speech Treatment       [x] Expressive Language  [] Receptive Language   [] Dysphagia Treatment   [] Cognitive Skill Golden  [] Oral Motor  [] Voice Treatment       []  AAC     [] ESTIM  [] Speech production       [] Therapeutic Meal Monitor               [x] Instruction in compensatory strategies       Objective/Assessment:  Pt was educated in circumlocution strategies  Named 5-6 items in concrete category with min verbal cues and named category given 4 members with 90% acc min assist        Treatment/Activity Tolerance:     [x]  Patient tolerated treatment well []  Patient limited by fatique    []  Patient limited by pain  []  Patient limited by other medical complications   []  Other:     Plan: [x]  Continue per plan of care []  Alter current plan (see comments)    []  Plan of care initiated []  Hold pending MD visit []  Discharge    Pain:  [x] Pt demonstrated no s/s of pain during this visit. none  N/A      Patient/ Caregiver Education:  [x] Patient/Caregiver Educated on session and progression towards goals. [x] Patient/Caregiver stated verbal understanding of directions.    [] Reinforcement

## 2018-01-11 NOTE — PROGRESS NOTES
Subjective: The patient complains of moderate to severe acute  right wrist and right hip pain partially relieved by Lidoderm, rest, splinting, ice, Percocet and exacerbated by  weightbearing and range of motion and palpation. She complains of new non-tender left thigh mass. patient and RN Noted hard area to left medial thigh. Also noted LLE has prominent varicose veins. Per pt no hx of DVT. Currently on ASA, and Lovenox qd    She complains of light headedness relieved with abdominal binder and OTONIEL hose. She also complains of severe constipation relieved with digital disimpaction . ROS x10: The patient also complains of severely impaired mobility and activities of daily living. Otherwise no new problems with vision, hearing, nose, mouth, throat, dermal, cardiovascular, GI, , pulmonary, musculoskeletal, psychiatric or neurological. See Rehab H&P on Rehab chart dated 01/03/18. Vital signs:  /71   Pulse 88   Temp 98 °F (36.7 °C) (Oral)   Resp 17   Ht 5' 7\" (1.702 m)   Wt 211 lb (95.7 kg)   SpO2 93%   BMI 33.05 kg/m²   I/O:   PO/Intake:  fair PO intake, no problems observed or reported. Bowel/Bladder:  continent, severe constipation. General:  Patient is well developed, adequately nourished, non-obese and     well kempt. HEENT:    PERRLA, hearing intact to loud voice, external inspection of ear     and nose benign. Inspection of lips, tongue and gums benign  Musculoskeletal: No significant change in strength or tone. All joints stable. Inspection and palpation of digits and nails show no clubbing,       cyanosis or inflammatory conditions. Neuro/Psychiatric: Affect: Bright and pleasant. Alert and oriented to person, place and     situation. No significant change in deep tendon reflexes or     Sensation-she has subtle cognitive deficits  Lungs:  Diminished CTA-B. Respiration effort is normal at rest.     Heart:   S1 = S2, RRR. No loud murmurs.     Abdomen:  Soft, non-tender, no enlargement of liver or spleen. Extremities:  significant lower extremity edema and right hip tenderness. Right wrist is less tender. Non-tender left thigh mass. Skin:    Significant servicing demonstration of the right hip incision mild bilateral upper extremity bruising    Rehabilitation:  Physical therapy: FIMS:  Bed Mobility: Supine to Sit: Stand by assistance (from hospital bed with HOB elevated.  )  Sit to Supine: Minimal assistance  Scooting: Minimal assistance    Transfers: Sit to Stand: Stand by assistance  Stand to sit: Stand by assistance  Bed to Chair: Maximum assistance (max to stand then minima assist and VCs to SPT to mat with platform WW.  ), Ambulation 1  Surface: carpet  Device: Platform Walker right  Assistance: Stand by assistance  Quality of Gait: Focused gait training on improving step length and heel/toe pattern. Fatigued after 125ft. Distance: 100ft  Comments: No WC follow. Pt able to ambulate to pre planned destination. , Stairs  # Steps : 4  Stairs Height: 6\"  Rails: Bilateral (Pt ulilized r forearm on R stair rail.  )  Assistance: Minimal assistance  Comment: Increased SOB on stairs which decreased on completed.       FIMS: Bed, Chair, Wheel Chair: 4 - Requires steadying assistance only <25% assist  and/or requires assist with one leg only  Walk: 2 - Maximal Assistance Requires up to [address] requires assistance of one person to walk/operate wheelchair between  feet (Patient performs 25-49% of locomotion effort or goes between  feet)  Distance Walked: 100ft  Wheel Chair: 2 - [address] up to [address] requires assistance of one person to walk/operate wheelchair between [address] in [address] [place] [address]: 0  Stairs: 2- Maximal Assistance Performs 25-49% of the effort to go up and down 4 to 6 stairs and requires the assistance of one person only,  , Assessment: Pt with improved sit to stand tolerated, up with assist, follow-up with orthopedic surgeon. 2.  History of Panic attacks - add recreational therapy, spiritual care, and music therapy, emotional support, adjust/add medications prn. Add adjustment to disability support group. 3.  Type 2 diabetes mellitus, hyperglycemia, Blood sugar 133 without Glucophage - Continue blood sugar checks BID, 1500-calorie ADA diet, adjust/add medications prn.        4. No results for input(s): POCGLU in the last 72 hours. Venous stasis dermatitis, Seborrheic keratosis,  Punctate keratitis - add ET mix bilateral lower extremities and protect her heels by placing them off the bed  4. 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 Check orthostatic BP. I prescribed abdominal binder, OTONIEL hose. 5.   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 The prognosis for a subependymoma is better than for most ependymal tumors, and is considered a grade I tumor in the [address] [facility]) classification. 6.   Severe Right UE pain - no fracture, healed Closed fracture of distal end of left radius now with acute right wrist sprain - upgrade to WBAT, right wrist splint for comfort only. Add lidocaine patch as well as Voltaren gel. Right wrist needs Ace Wrap.   7.   Vitamin D deficiency and osteoporotic fractures - supplement and recheck as outpatient. 8.  Progressive Postoperative anemia -likely secondary to [address] numbness drainage at incision site recheck CBC. Monitor vital signs, check H&H prn. I prescribed iron. Discontinue daily ferritin levels  9. Chronic kidney disease - recheck BMP, limit toxic medications. Encourage hydration.    10.  Question UTI, Urinary

## 2018-01-11 NOTE — PROGRESS NOTES
Pt up to BR with walker steady gait, denies pain, some PRASAD, but can recover once seated. Noted red, warm, hard area to left medial thigh. Also noted LLE has prominent varicose veins. Per pt no hx of DVT. Currently on ASA, and Lovenox qd. Notified RN who stated to pass on to day shift oncoming nurse. in AM report. Also sent communication sticky note to the physician. Pt returned to bed call light in reach. Bed alarm engaged and functional.  No further concerns at this time.

## 2018-01-11 NOTE — PROGRESS NOTES
Occupational Therapy  Facility/Department: [facility]  Daily Treatment Note  NAME: Kemar Vigil  : 1935  MRN: [id number]    Date of Service: 2018    Patient Diagnosis(es): There were no encounter diagnoses. has a past medical history of Abnormality of gait and mobility; Chronic kidney disease; Closed fracture of distal end of left radius; CVA (cerebral vascular accident) ([facility] [address] Essential hypertension; Fracture of hip, closed, right, [facility] H/O total hip arthroplasty, right; History of TIA (transient ischemic attack); Hypercholesteremia; Hyperlipidemia; Obesity (BMI 30.0-34.9); Panic attacks; Postoperative anemia; Type 2 diabetes mellitus without complication ([facility] Urinary incontinence; Venous stasis dermatitis; and Vitamin D deficiency. has a past surgical history that includes Appendectomy (); Upper gastrointestinal endoscopy (03); Colonoscopy; eye surgery; fracture surgery; and partial hip replacement (Right, 2017).     Restrictions  Restrictions/Precautions  Restrictions/Precautions: Fall Risk  Lower Extremity Weight Bearing Restrictions  Right Lower Extremity Weight Bearing: Weight Bearing As Tolerated  Upper Extremity Weight Bearing Restrictions  Right Upper Extremity Weight Bearing: Weight Bearing As Tolerated  Left Upper Extremity Weight Bearing: Weight Bearing As Tolerated  Other: RUE Splint for comfort during ambulation per Dr. Merlene Chatterjee; x-ray negative for fx  Position Activity Restriction  Hip Precautions: Posterior hip precautions  Other position/activity restrictions: Pt able to recall 2/3/ hip precations  Subjective   General  Chart Reviewed: Yes  Referring Practitioner: Dr. Merlene Chatterjee  Diagnosis: Right femoral neck fx s/p fall with R ANN on 17 with impaired mobility  Pre Treatment Pain Screening  Pain at present: 0  Scale Used: Numeric Score  Intervention List: Patient able to continue with treatment  Pain Assessment  Patient Currently in Pain: No  Pain Assessment: 0-10  Pain Level: 0  Vital Signs  Patient Currently in Pain: No   Orientation     Objective      Therapist late to tx 2° to with another pt. Will attempt makeup time later if able. Pt. engaged in activity incorporating scanning, fine motor and problem solving, sorting pony beads by color. Pt. required min verbal cues and increased time to use R hand to manipulate beads. Pt. states \"I don't know what these match because I'm blind,\" however, pt. 80% accurate matching colors. Pt. then engaged in repetitive reaching task overhead to improve coordination and sequencing to improve independence with ADL tasks. Pt. was able to place clothespins on yardstick with min difficulty with R hand and no difficulty with L hand. Assessment      Discharge Recommendations: Continue to assess pending progress  Activity Tolerance  Activity Tolerance: Patient Tolerated treatment well  Safety Devices  Safety Devices in place: Yes  Type of devices: All fall risk precautions in place       Discharge Recommendations:  Continue to assess pending progress     Plan   Plan  Times per week: 5-7x/week, 15-60 minutes/day  Times per day: Daily  Plan weeks: 1 1/2-2  Current Treatment Recommendations: Strengthening, Balance Training, Endurance Training, Self-Care / ADL, Safety Education & Training, Patient/Caregiver Education & Training, Equipment Evaluation, Education, & procurement, Home Management Training, Cognitive/Perceptual Training  Plan Comment: Continue POC  G-Code     OutComes Score  AM-PAC Score  Goals  Patient Goals   Patient goals :  \"Get better and go home\"       Therapy Time   Individual Concurrent Group Co-treatment   Time In 1010         Time Out 1030         Minutes [address] HA[address] Lug[address]/L Electronically signed by YEIMY Muñoz on 1/11/2018 at 1:28 PM

## 2018-01-12 PROCEDURE — 6370000000 HC RX 637 (ALT 250 FOR IP): Performed by: NURSE PRACTITIONER

## 2018-01-12 PROCEDURE — 99233 SBSQ HOSP IP/OBS HIGH 50: CPT | Performed by: PHYSICAL MEDICINE & REHABILITATION

## 2018-01-12 PROCEDURE — 6370000000 HC RX 637 (ALT 250 FOR IP): Performed by: INTERNAL MEDICINE

## 2018-01-12 PROCEDURE — 6370000000 HC RX 637 (ALT 250 FOR IP): Performed by: PHYSICAL MEDICINE & REHABILITATION

## 2018-01-12 PROCEDURE — 6360000002 HC RX W HCPCS: Performed by: NURSE PRACTITIONER

## 2018-01-12 PROCEDURE — 1180000000 HC REHAB R&B

## 2018-01-12 RX ORDER — PANTOPRAZOLE SODIUM 40 MG/1
40 TABLET, DELAYED RELEASE ORAL
Status: DISCONTINUED | OUTPATIENT
Start: 2018-01-13 | End: 2018-01-15

## 2018-01-12 RX ADMIN — ATORVASTATIN CALCIUM 10 MG: 10 TABLET, FILM COATED ORAL at 19:23

## 2018-01-12 RX ADMIN — Medication 100 MG: at 08:05

## 2018-01-12 RX ADMIN — RIVAROXABAN 15 MG: 15 TABLET, FILM COATED ORAL at 12:46

## 2018-01-12 RX ADMIN — DOCUSATE SODIUM 100 MG: 100 CAPSULE, LIQUID FILLED ORAL at 08:04

## 2018-01-12 RX ADMIN — Medication 100 MG: at 12:46

## 2018-01-12 RX ADMIN — DOCUSATE SODIUM 100 MG: 100 CAPSULE, LIQUID FILLED ORAL at 19:23

## 2018-01-12 RX ADMIN — ENOXAPARIN SODIUM 40 MG: 40 INJECTION, SOLUTION INTRAVENOUS; SUBCUTANEOUS at 08:05

## 2018-01-12 RX ADMIN — ASPIRIN 81 MG: 81 TABLET, COATED ORAL at 08:05

## 2018-01-12 RX ADMIN — RIVAROXABAN 15 MG: 15 TABLET, FILM COATED ORAL at 17:31

## 2018-01-12 ASSESSMENT — PAIN SCALES - GENERAL: PAINLEVEL_OUTOF10: 0

## 2018-01-12 NOTE — PROGRESS NOTES
Lactinex 2 PO every AC. MOM prn, Brown Bomb prn, Glycerin suppository prn, enema prn. Status post digital disimpaction. I prescribed Miralax daily. 3. Severe right hip and right wrist pain generalized OA pain: reassess pain every shift and prior to and after each therapy session, give prn Tylenol and scheduled Percocet, modalities prn in therapy, Lidoderm, K-pad prn. I reviewed OARRS report, no significant opiates, no significant sedatives. Pain is better with scheduled Percocet, I will monitor for sedation. 4. Skin healing Right hip [place] numbness drainage and breakdown risk:  continue pressure relief program.  Daily skin exams and reports from nursing. Dry sterile dressing to the right hip every shift add CoQ10  5. Severe Fatigue due to nutritional and hydration deficiency:  continue to monitor I&Os, calorie counts prn, dietary consult prn. Add vitamin B12 CoQ10 and vitamin D  6. Acute episodic insomnia with situational adjustment disorder:  prn Ambien, monitor for day time sedation. 7. Falls risk elevated:  patient to use call light to get nursing assistance to get up, bed and chair alarm. 8. Significant Left lobe or extremity superficial thromboembolism and DVT risk: progressive activities in PT, continue prophylaxis OTONIEL hose, elevation and  Xaralto for extensive superficial painful thrombophlebitis . Consult heme on current to rule out hypercoagulable state and to transition off Xaralto to lower doses and to monitor for true DVT. 9. Complex discharge planning:  Discharge date is set for Fri., 01/19/18 to home alone with occasional supervision and home health PT, OT, RN, aide and . I hope to have her using a regular wheeled walker at discharge. Team meeting Thursday to assess progress towards goals, discuss and address social, psychological and medical comorbidities and to address difficulties they may be having progressing in therapy. Patient and family education is in progress. numbness drainage at incision site recheck CBC. Monitor vital signs, check H&H prn. I prescribed iron. Discontinue daily ferritin levels  9. Chronic kidney disease - recheck BMP, limit toxic medications. Encourage hydration. 10.  Question UTI, Urinary incontinence - Add timed voiding, check postvoid residual, UA on 01/09/18 shows few bacteria, check urine C&S. 11.   Obesity (BMI 30.0-34.9) - Hospital diet, monitor weight, follow-up with family physician after discharge. 12. Mild dementia -  Moderate cognitive deficits recognize status post assessment with SLP. Will add memory exercises memory no plaque and limited toxic medications. 13. Light headedness - relieved with abdominal binder and OTONIEL hose. 14. New non-tender left thigh mass, extensive superficial saphenous clot left thigh, left calf - Hospitalist prescribed Xarelto 15mg BID. Hold therapy for24 hours. Consider IVC filter if history of GI bleed. Consult fossa level is consult heme onc  to evaluate for hypercoagulable state.             This note transcribed by Darling Stewart on 01/12/18 at 11:52 a.m.           Sal Guillory D.O., PM&R     Attending    [address]

## 2018-01-13 LAB
HCT VFR BLD CALC: 25.5 % (ref 37–47)
HEMOGLOBIN: 8.5 G/DL (ref 12–16)
IRON SATURATION: 21 % (ref 11–46)
IRON: 44 UG/DL (ref 37–145)
RETICULOCYTE ABSOLUTE COUNT: 0.09 M/CUMM (ref 0.02–0.11)
RETICULOCYTE COUNT PCT: 3 % (ref 0.6–2.2)
TOTAL IRON BINDING CAPACITY: 213 UG/DL (ref 178–450)

## 2018-01-13 PROCEDURE — 97535 SELF CARE MNGMENT TRAINING: CPT

## 2018-01-13 PROCEDURE — 83550 IRON BINDING TEST: CPT

## 2018-01-13 PROCEDURE — 85014 HEMATOCRIT: CPT

## 2018-01-13 PROCEDURE — 83540 ASSAY OF IRON: CPT

## 2018-01-13 PROCEDURE — 6370000000 HC RX 637 (ALT 250 FOR IP): Performed by: INTERNAL MEDICINE

## 2018-01-13 PROCEDURE — 86356 MONONUCLEAR CELL ANTIGEN: CPT

## 2018-01-13 PROCEDURE — 85018 HEMOGLOBIN: CPT

## 2018-01-13 PROCEDURE — 85046 RETICYTE/HGB CONCENTRATE: CPT

## 2018-01-13 PROCEDURE — 36415 COLL VENOUS BLD VENIPUNCTURE: CPT

## 2018-01-13 PROCEDURE — 6370000000 HC RX 637 (ALT 250 FOR IP): Performed by: PHYSICAL MEDICINE & REHABILITATION

## 2018-01-13 PROCEDURE — 97116 GAIT TRAINING THERAPY: CPT

## 2018-01-13 PROCEDURE — 86880 COOMBS TEST DIRECT: CPT

## 2018-01-13 PROCEDURE — 1180000000 HC REHAB R&B

## 2018-01-13 PROCEDURE — 6370000000 HC RX 637 (ALT 250 FOR IP): Performed by: NURSE PRACTITIONER

## 2018-01-13 RX ADMIN — RIVAROXABAN 15 MG: 15 TABLET, FILM COATED ORAL at 17:27

## 2018-01-13 RX ADMIN — POLYETHYLENE GLYCOL 3350 17 G: 17 POWDER, FOR SOLUTION ORAL at 17:27

## 2018-01-13 RX ADMIN — ATORVASTATIN CALCIUM 10 MG: 10 TABLET, FILM COATED ORAL at 20:35

## 2018-01-13 RX ADMIN — RIVAROXABAN 15 MG: 15 TABLET, FILM COATED ORAL at 10:09

## 2018-01-13 RX ADMIN — PANTOPRAZOLE SODIUM 40 MG: 40 TABLET, DELAYED RELEASE ORAL at 06:22

## 2018-01-13 RX ADMIN — Medication 100 MG: at 10:09

## 2018-01-13 RX ADMIN — DOCUSATE SODIUM 100 MG: 100 CAPSULE, LIQUID FILLED ORAL at 20:35

## 2018-01-13 RX ADMIN — Medication 100 MG: at 12:44

## 2018-01-13 RX ADMIN — ASPIRIN 81 MG: 81 TABLET, COATED ORAL at 10:09

## 2018-01-13 RX ADMIN — DOCUSATE SODIUM 100 MG: 100 CAPSULE, LIQUID FILLED ORAL at 10:08

## 2018-01-13 ASSESSMENT — PAIN SCALES - GENERAL: PAINLEVEL_OUTOF10: 0

## 2018-01-14 LAB
DAT POLYSPECIFIC: NORMAL
HCT VFR BLD CALC: 26.3 % (ref 37–47)
HEMOGLOBIN: 8.7 G/DL (ref 12–16)
MISCELLANEOUS LAB TEST ORDER: NORMAL
WHOPPER PROMPT: NORMAL

## 2018-01-14 PROCEDURE — 97110 THERAPEUTIC EXERCISES: CPT

## 2018-01-14 PROCEDURE — 97116 GAIT TRAINING THERAPY: CPT

## 2018-01-14 PROCEDURE — 6370000000 HC RX 637 (ALT 250 FOR IP): Performed by: NURSE PRACTITIONER

## 2018-01-14 PROCEDURE — 36415 COLL VENOUS BLD VENIPUNCTURE: CPT

## 2018-01-14 PROCEDURE — 6370000000 HC RX 637 (ALT 250 FOR IP): Performed by: PHYSICAL MEDICINE & REHABILITATION

## 2018-01-14 PROCEDURE — 85018 HEMOGLOBIN: CPT

## 2018-01-14 PROCEDURE — 85014 HEMATOCRIT: CPT

## 2018-01-14 PROCEDURE — 6370000000 HC RX 637 (ALT 250 FOR IP): Performed by: INTERNAL MEDICINE

## 2018-01-14 PROCEDURE — 1180000000 HC REHAB R&B

## 2018-01-14 RX ADMIN — POLYETHYLENE GLYCOL 3350 17 G: 17 POWDER, FOR SOLUTION ORAL at 16:34

## 2018-01-14 RX ADMIN — DOCUSATE SODIUM 100 MG: 100 CAPSULE, LIQUID FILLED ORAL at 20:49

## 2018-01-14 RX ADMIN — Medication 100 MG: at 08:57

## 2018-01-14 RX ADMIN — ATORVASTATIN CALCIUM 10 MG: 10 TABLET, FILM COATED ORAL at 20:49

## 2018-01-14 RX ADMIN — ASPIRIN 81 MG: 81 TABLET, COATED ORAL at 08:57

## 2018-01-14 RX ADMIN — DOCUSATE SODIUM 100 MG: 100 CAPSULE, LIQUID FILLED ORAL at 08:59

## 2018-01-14 RX ADMIN — RIVAROXABAN 15 MG: 15 TABLET, FILM COATED ORAL at 16:34

## 2018-01-14 RX ADMIN — RIVAROXABAN 15 MG: 15 TABLET, FILM COATED ORAL at 08:57

## 2018-01-14 RX ADMIN — PANTOPRAZOLE SODIUM 40 MG: 40 TABLET, DELAYED RELEASE ORAL at 06:34

## 2018-01-14 RX ADMIN — Medication 100 MG: at 13:04

## 2018-01-14 ASSESSMENT — PAIN SCALES - GENERAL: PAINLEVEL_OUTOF10: 0

## 2018-01-14 ASSESSMENT — ENCOUNTER SYMPTOMS
DIARRHEA: 0
NAUSEA: 0
VOMITING: 0
CONSTIPATION: 0
COUGH: 0
SHORTNESS OF BREATH: 0
WHEEZING: 0
SPUTUM PRODUCTION: 0

## 2018-01-14 NOTE — PROGRESS NOTES
Lidoderm, K-pad prn. I reviewed OARRS report, no significant opiates, no significant sedatives. Pain is better with scheduled Percocet, I will monitor for sedation. 4. Skin healing Right hip [place] numbness drainage and breakdown risk:  continue pressure relief program.  Daily skin exams and reports from nursing. Dry sterile dressing to the right hip every shift add CoQ10  5. Severe Fatigue due to nutritional and hydration deficiency:  continue to monitor I&Os, calorie counts prn, dietary consult prn. Add vitamin B12 CoQ10 and vitamin D  6. Acute episodic insomnia with situational adjustment disorder:  prn Ambien, monitor for day time sedation. 7. Falls risk elevated:  patient to use call light to get nursing assistance to get up, bed and chair alarm. 8. Elevated DVT risk: progressive activities in PT, continue prophylaxis OTONIEL hose, elevation and  Lovenox consider holding Lovenox if serosanguineous drainage continues . 9. Complex discharge planning:  Discharge date is set for Fri., 01/19/18 to home alone with occasional supervision and home health PT, OT, RN, aide and . I hope to have her using a regular wheeled walker at discharge. Team meeting Thursday to assess progress towards goals, discuss and address social, psychological and medical comorbidities and to address difficulties they may be having progressing in therapy. Patient and family education is in progress. The patient is to follow-up with their family physician after discharge. Complex Active General Medical Issues that complicate care Assess & Plan:    1. Fracture, hip, right, open type I or II, -status post right total hip arthroplasty - weightbearing as tolerated, up with assist, follow-up with orthopedic surgeon. 2.  History of Panic attacks - add recreational therapy, spiritual care, and music therapy, emotional support, adjust/add medications prn. Add adjustment to disability support group.   3.  Type 2 diabetes deficits recognize status post assessment with SLP. Will add memory exercises memory no plaque and limited toxic medications. 13. Light headedness - relieved with abdominal binder and OTONIEL hose.   14. New non-tender left thigh mass.         Yevgeniy Lyman MD covering          Tenzin Burrell D.O., PM&R     Attending    [address]

## 2018-01-14 NOTE — PROGRESS NOTES
occur  Dressing-Lower: 0 - Did not occur  Toileting: 3 - Able to perform 2 tasks  Toilet Transfer: 0 - Did not occur  Tub Transfer: 0 - Activity does not occur  Shower Transfer: 4 - Minimal contact assistance, pt. expends 75% or more effort,  , Assessment: Pt. demonstrated decreased balance, strength, endurance and recall of precautions, which impacts her ability to safely perform ADL tasks. Pt. would benefit from skilled OT to assess these deficits and promote increased independence with ADL tasks    Speech therapy: FIMS: Comprehension: 4 - Patient understands basic needs 75-90%+ of the time  Expression: 4 - Expresses basic ideas/needs 75-90%+ of the time  Social Interaction: 5 - Patient is appropriate with supervision/cues  Problem Solving: 3 - Patient solves simple/routine tasks 50%-74%  Memory: 3 - Patient remembers 50%-74% of the time      Lab/X-ray studies reviewed, analyzed and discussed with patient and staff:   Recent Results (from the past 24 hour(s))   MISC ARUP 1    Collection Time: 01/13/18  6:00 AM   Result Value Ref Range    [place] Prompt 2,005,006    Hemoglobin and Hematocrit, Blood    Collection Time: 01/13/18  6:02 AM   Result Value Ref Range    Hemoglobin 8.5 (L) 12.0 - 16.0 g/dL    Hematocrit 25.5 (L) 37.0 - 47.0 %   Iron and TIBC    Collection Time: 01/13/18  6:02 AM   Result Value Ref Range    Iron 44 37 - 145 ug/dL    TIBC 213 178 - 450 ug/dL    Iron Saturation 21 11 - 46 %   Reticulocytes    Collection Time: 01/13/18  6:02 AM   Result Value Ref Range    Retic Ct Pct 3.0 (H) 0.6 - 2.2 %    Retic Ct Abs 0.087 0.022 - 0.111 m/cumm           Xr Femur Right (min 2 Views)  Result Date: 12/29/2017  CONCLUSION: RIGHT FEMORAL NECK FRACTURE WITH VARUS DEFORMITY. SMALL BIBASILAR DEPENDENT PULMONARY ATELECTASIS. Xr Knee Right (3 Views)  Result Date: 12/29/2017  CONCLUSION: RIGHT FEMORAL NECK FRACTURE WITH VARUS DEFORMITY. SMALL BIBASILAR DEPENDENT PULMONARY ATELECTASIS.        Previous extensive,

## 2018-01-15 LAB
ANION GAP SERPL CALCULATED.3IONS-SCNC: 12 MEQ/L (ref 7–13)
BASOPHILS ABSOLUTE: 0.1 K/UL (ref 0–0.2)
BASOPHILS RELATIVE PERCENT: 0.8 %
BUN BLDV-MCNC: 14 MG/DL (ref 8–23)
CALCIUM SERPL-MCNC: 9.8 MG/DL (ref 8.6–10.2)
CHLORIDE BLD-SCNC: 106 MEQ/L (ref 98–107)
CO2: 23 MEQ/L (ref 22–29)
CREAT SERPL-MCNC: 0.75 MG/DL (ref 0.5–0.9)
EOSINOPHILS ABSOLUTE: 0.2 K/UL (ref 0–0.7)
EOSINOPHILS RELATIVE PERCENT: 3.2 %
GFR AFRICAN AMERICAN: >60
GFR NON-AFRICAN AMERICAN: >60
GLUCOSE BLD-MCNC: 140 MG/DL (ref 74–109)
HCT VFR BLD CALC: 27.1 % (ref 37–47)
HEMOGLOBIN: 9 G/DL (ref 12–16)
LYMPHOCYTES ABSOLUTE: 1.2 K/UL (ref 1–4.8)
LYMPHOCYTES RELATIVE PERCENT: 16.6 %
MCH RBC QN AUTO: 29.3 PG (ref 27–31.3)
MCHC RBC AUTO-ENTMCNC: 33.1 % (ref 33–37)
MCV RBC AUTO: 88.3 FL (ref 82–100)
MONOCYTES ABSOLUTE: 0.6 K/UL (ref 0.2–0.8)
MONOCYTES RELATIVE PERCENT: 8.7 %
NEUTROPHILS ABSOLUTE: 5.3 K/UL (ref 1.4–6.5)
NEUTROPHILS RELATIVE PERCENT: 70.7 %
PDW BLD-RTO: 14.1 % (ref 11.5–14.5)
PLATELET # BLD: 517 K/UL (ref 130–400)
POTASSIUM SERPL-SCNC: 3.9 MEQ/L (ref 3.5–5.1)
RBC # BLD: 3.06 M/UL (ref 4.2–5.4)
SODIUM BLD-SCNC: 141 MEQ/L (ref 132–144)
WBC # BLD: 7.5 K/UL (ref 4.8–10.8)

## 2018-01-15 PROCEDURE — 36415 COLL VENOUS BLD VENIPUNCTURE: CPT

## 2018-01-15 PROCEDURE — 1180000000 HC REHAB R&B

## 2018-01-15 PROCEDURE — 97535 SELF CARE MNGMENT TRAINING: CPT

## 2018-01-15 PROCEDURE — 80048 BASIC METABOLIC PNL TOTAL CA: CPT

## 2018-01-15 PROCEDURE — 97110 THERAPEUTIC EXERCISES: CPT

## 2018-01-15 PROCEDURE — 97112 NEUROMUSCULAR REEDUCATION: CPT

## 2018-01-15 PROCEDURE — 6370000000 HC RX 637 (ALT 250 FOR IP): Performed by: NURSE PRACTITIONER

## 2018-01-15 PROCEDURE — 97116 GAIT TRAINING THERAPY: CPT

## 2018-01-15 PROCEDURE — 6370000000 HC RX 637 (ALT 250 FOR IP): Performed by: INTERNAL MEDICINE

## 2018-01-15 PROCEDURE — 99232 SBSQ HOSP IP/OBS MODERATE 35: CPT | Performed by: PHYSICAL MEDICINE & REHABILITATION

## 2018-01-15 PROCEDURE — 97530 THERAPEUTIC ACTIVITIES: CPT

## 2018-01-15 PROCEDURE — 6370000000 HC RX 637 (ALT 250 FOR IP): Performed by: PHYSICAL MEDICINE & REHABILITATION

## 2018-01-15 PROCEDURE — 85025 COMPLETE CBC W/AUTO DIFF WBC: CPT

## 2018-01-15 PROCEDURE — 97127 HC SP THER IVNTJ W/FOCUS COG FUNCJ: CPT

## 2018-01-15 RX ORDER — OXYMETAZOLINE HYDROCHLORIDE 0.05 G/100ML
2 SPRAY NASAL 2 TIMES DAILY
Status: DISCONTINUED | OUTPATIENT
Start: 2018-01-15 | End: 2018-01-15

## 2018-01-15 RX ORDER — FLUTICASONE PROPIONATE 50 MCG
2 SPRAY, SUSPENSION (ML) NASAL DAILY
Status: DISCONTINUED | OUTPATIENT
Start: 2018-01-15 | End: 2018-01-23 | Stop reason: HOSPADM

## 2018-01-15 RX ORDER — CALCIUM CARBONATE 200(500)MG
1000 TABLET,CHEWABLE ORAL 3 TIMES DAILY PRN
Status: DISCONTINUED | OUTPATIENT
Start: 2018-01-15 | End: 2018-01-23 | Stop reason: HOSPADM

## 2018-01-15 RX ADMIN — PANTOPRAZOLE SODIUM 40 MG: 40 TABLET, DELAYED RELEASE ORAL at 05:43

## 2018-01-15 RX ADMIN — POLYETHYLENE GLYCOL 3350 17 G: 17 POWDER, FOR SOLUTION ORAL at 17:45

## 2018-01-15 RX ADMIN — Medication 100 MG: at 08:00

## 2018-01-15 RX ADMIN — FLUTICASONE PROPIONATE 2 SPRAY: 50 SPRAY, METERED NASAL at 23:19

## 2018-01-15 RX ADMIN — CETIRIZINE HYDROCHLORIDE 5 MG: 5 SOLUTION ORAL at 23:18

## 2018-01-15 RX ADMIN — ATORVASTATIN CALCIUM 10 MG: 10 TABLET, FILM COATED ORAL at 23:18

## 2018-01-15 RX ADMIN — Medication 1 DROP: at 17:45

## 2018-01-15 RX ADMIN — DOCUSATE SODIUM 100 MG: 100 CAPSULE, LIQUID FILLED ORAL at 08:00

## 2018-01-15 RX ADMIN — Medication 100 MG: at 12:23

## 2018-01-15 RX ADMIN — OXYMETAZOLINE HYDROCHLORIDE 2 SPRAY: 5 SPRAY NASAL at 12:23

## 2018-01-15 ASSESSMENT — PAIN SCALES - GENERAL
PAINLEVEL_OUTOF10: 0
PAINLEVEL_OUTOF10: 2
PAINLEVEL_OUTOF10: 0

## 2018-01-15 ASSESSMENT — PAIN DESCRIPTION - PROGRESSION: CLINICAL_PROGRESSION: NOT CHANGED

## 2018-01-15 ASSESSMENT — PAIN DESCRIPTION - ORIENTATION: ORIENTATION: RIGHT

## 2018-01-15 ASSESSMENT — PAIN DESCRIPTION - PAIN TYPE: TYPE: ACUTE PAIN

## 2018-01-15 ASSESSMENT — PAIN DESCRIPTION - LOCATION: LOCATION: WRIST

## 2018-01-15 ASSESSMENT — PAIN DESCRIPTION - DESCRIPTORS: DESCRIPTORS: ACHING

## 2018-01-15 NOTE — PROGRESS NOTES
oxyCODONE-acetaminophen (PERCOCET) 5-325 MG per tablet 1 tablet  1 tablet Oral Q4H PRN [place] Abdi, CNP   1 tablet at 01/04/18 6950    Or    oxyCODONE-acetaminophen (PERCOCET) 5-325 MG per tablet 2 tablet  2 tablet Oral Q4H PRN [place] Abdi, CNP        atorvastatin (LIPITOR) tablet 10 mg  10 mg Oral Daily [place] Abdi, CNP   10 mg at 01/14/18 2049    calcium carbonate (TUMS) chewable tablet 500 mg  500 mg Oral TID PRN [place] Abdi, CNP        cinnamon oil liquid 1 drop  1 drop Oral 4x Daily [place] Abdi, CNP   Stopped at 01/15/18 1035       OBJECTIVE  PHYSICAL EXAM:   /82   Pulse 80   Temp 97 °F (36.1 °C) (Oral)   Resp 18   Ht 5' 7\" (1.702 m)   Wt 211 lb (95.7 kg)   SpO2 93%   BMI 33.05 kg/m²   Body mass index is 33.05 kg/m². CONSTITUTIONAL:  awake, alert, cooperative, no apparent distress, and appears stated age  NECK:  Supple, symmetrical, trachea midline, no adenopathy, thyroid symmetric, not enlarged and no tenderness, skin normal  BACK:  Symmetric, no curvature, spinous processes are non-tender on palpation, paraspinous muscles are non-tender on palpation, no costal vertebral tenderness  LUNGS:  No increased work of breathing, good air exchange, clear to auscultation bilaterally, no crackles or wheezing  CARDIOVASCULAR:  Normal apical impulse, regular rate and rhythm, normal S1 and S2, no S3 or S4, and no murmur noted  ABDOMEN:  No scars, normal bowel sounds, soft, non-distended, non-tender, no masses palpated, no hepatosplenomegally  MUSCULOSKELETAL:  There is no redness, warmth, or swelling of the joints. Full range of motion noted. Motor strength is 5 out of 5 all extremities bilaterally. Tone is normal.  NEUROLOGIC:  Awake, alert, oriented to name, place and time. Cranial nerves II-XII are grossly intact. Motor is 5 out of 5 bilaterally. Cerebellar finger to nose, heel to shin intact. Sensory is intact.   Babinski down going, Romberg negative, and gait is normal.  SKIN: no bruising or bleeding, no lesions, or jaundice    DATA:     Recent Results (from the past 24 hour(s))   CBC Auto Differential    Collection Time: 01/15/18  7:19 AM   Result Value Ref Range    WBC 7.5 4.8 - 10.8 K/uL    RBC 3.06 (L) 4.20 - 5.40 M/uL    Hemoglobin 9.0 (L) 12.0 - 16.0 g/dL    Hematocrit 27.1 (L) 37.0 - 47.0 %    MCV 88.3 82.0 - 100.0 fL    MCH 29.3 27.0 - 31.3 pg    MCHC 33.1 33.0 - 37.0 %    RDW 14.1 11.5 - 14.5 %    Platelets 892 (H) 087 - 400 K/uL    Neutrophils % 70.7 %    Lymphocytes % 16.6 %    Monocytes % 8.7 %    Eosinophils % 3.2 %    Basophils % 0.8 %    Neutrophils # 5.3 1.4 - 6.5 K/uL    Lymphocytes # 1.2 1.0 - 4.8 K/uL    Monocytes # 0.6 0.2 - 0.8 K/uL    Eosinophils # 0.2 0.0 - 0.7 K/uL    Basophils # 0.1 0.0 - 0.2 K/uL   Basic Metabolic Panel    Collection Time: 01/15/18  7:19 AM   Result Value Ref Range    Sodium 141 132 - 144 mEq/L    Potassium 3.9 3.5 - 5.1 mEq/L    Chloride 106 98 - 107 mEq/L    CO2 23 22 - 29 mEq/L    Anion Gap 12 7 - 13 mEq/L    Glucose 140 (H) 74 - 109 mg/dL    BUN 14 8 - 23 mg/dL    CREATININE 0.75 0.50 - 0.90 mg/dL    GFR Non-African American >60.0 >60    GFR  >60.0 >60    Calcium 9.8 8.6 - 10.2 mg/dL       ASSESSMENT AND PLAN   1. Right hips fracture s/p ANN  2.  DM2-controlled-continue accuchecks with ss coverage dm meds and diet  3. Urinary incontinence  4. Abnormal gait and mobility secondary to right hip fractue  5. Hyperlipidemia  6. Hx of TIA  7.  htn-controlled-continue antihypertensive meds  Pt was seen and evaluated and discussed care with PA.  Agree with assessment and plan  Electronically signed by Mary Beth Gonsalez MD on 1/15/18 at 3:31 PM        SIGNATURE: KHADRA Beatty PATIENT NAME: Chelsi Boykin   DATE: January 15, 2018 MRN: [id number]   TIME: 12:57 PM PAGER: [phone]     Dr. Devorah Sanford, [address] [place] [address]

## 2018-01-15 NOTE — PROGRESS NOTES
Physical Therapy Rehab Treatment Note  Facility/Department: [facility]  Room: [place] [id number]-       NAME: Kemar Vigil  : 1935 (89 y.o.)  MRN: [id number]  CODE STATUS: Full Code    Date of Service: 1/15/2018  Chart Reviewed: Yes  Patient assessed for rehabilitation services?: Yes    Restrictions:  Restrictions/Precautions: Fall Risk  Body mass index is 33.05 kg/m². SUBJECTIVE: Subjective: \"Nothing to tell\"  Pain Screening  Patient Currently in Pain: No     Post Treatment Pain Screenin     OBJECTIVE:   Overall Orientation Status: Within Normal Limits    Bed mobility  Supine to Sit: Supervision  Sit to Supine: Stand by assistance  Comment: required VCs and increased time for technique to complete at SBA level. Transfers  Sit to Stand: Supervision  Stand to sit: Supervision    Ambulation  Ambulation?: Yes  Ambulation 1  Surface: level tile;carpet  Device: Rolling Walker  Assistance: Stand by assistance  Quality of Gait: Increased antalgic gait but able to tolerate WBing with R wrist on [employer]. Distance: 160ft  Comments: Progressed gait to [employer] without R platform. Stairs/Curb  NT PM d/t increased fatigue     Other exercises  Other exercises 1: standing R march, B heel raises x10 in stand   Ambulating around obstacles with [employer] focusing on maintaining hip prec when turning to L.      ASSESSMENT/COMMENTS: Increased fatigue this PM with increased seated rest breaks taken. Pt tolerated gait with [employer] without platform. PLAN OF CARE/Safety:   Safety Devices  Type of devices:  All fall risk precautions in place    Therapy Time:   Individual   Time In 1500   Time Out 1530   Minutes 30     Minutes:      Transfer/Bed mobility trainin      Gait training:10      Neuro re education:     Therapeutic ex:Lalo Glover PTA, 01/15/18 at 3:23 PM

## 2018-01-15 NOTE — PROGRESS NOTES
Pt experienced nosebleed in right nare, stopped spontaniously, Dr Parris Hernández saw pt, packed nare with gauze, will continue to monitor

## 2018-01-15 NOTE — PROGRESS NOTES
wrist is less tender.-Superficial clot in the saphenous on the left with of the calf level of bilevel     Non-tender left thigh mass. Skin:    Significant servicing demonstration of the right hip incision mild bilateral upper extremity bruising    Rehabilitation:  Physical therapy: FIMS:  Bed Mobility: Supine to Sit: Minimal assistance  Sit to Supine: Minimal assistance  Scooting: Minimal assistance    Transfers: Sit to Stand: Contact guard assistance, Stand by assistance  Stand to sit: Stand by assistance  Bed to Chair: Contact guard assistance, Ambulation 1  Surface: level tile  Device: Platform Walker right  Assistance: Stand by assistance  Quality of Gait: Decreased luigi, min difficulty with chnage in directions, VCs for posture. Distance: 100ft  Comments: distanced limited by fatigue this date, Stairs  # Steps : 4  Stairs Height: 6\"  Rails: Bilateral  Assistance: Contact guard assistance  Comment: VCs x1 for LE to lead with. FIMS: Bed, Chair, Wheel Chair: 0 - Did not occur  Walk: 2 - Maximal Assistance Requires up to Maximal Assistance AND requires assistance of one person to walk/operate wheelchair between  feet (Patient performs 25-49% of locomotion effort or goes between  feet)  Distance Walked: 100ft  Wheel Chair: 2 - Maximal Assistance Requires up to [address] requires assistance of one person to walk/operate wheelchair between [facility] [address] in [address] [place] [address] Street: 0  Stairs: 2- Maximal Assistance Performs 25-49% of the effort to go up and down 4 to 6 stairs and requires the assistance of one person only,  , Assessment: Increased gait distance today. Required assist and cues to follow hip prec with bed mobility.       Occupational therapy: FIMS:  Eatin - Feeds self with setup/supervision/cues and/or requires only setup/supervision/cues to perform tube feedings  Groomin - Did not occur  Bathin - Did not occur  Dressing-Upper: 0 - Did not PULMONARY ATELECTASIS. Previous extensive, complex labs, notes and diagnostics reviewed and analyzed. ALLERGIES:    Allergies as of 01/02/2018 - Review Complete 01/02/2018   Allergen Reaction Noted    Cephalexin  12/19/2011      (please also verify by checking [facility])      Complex Physical Medicine & Rehab Issues Assess & Plan:   1. Severe abnormality of gait and mobility and impaired self-care and ADL's secondary to acute right hip fracture status post right ANN with severe right wrist strain/sprain. Functional and medical status reassessed regarding patients ability to participate in therapies and patient found to be able to participate in acute intensive comprehensive inpatient rehabilitation program including PT/OT to improve balance, ambulation, ADLs, and to improve the P/AROM. Therapeutic modifications regarding activities in therapies, place, amount of time per day and intensity of therapy made daily. In bed therapies or bedside therapies prn.   2. Bowel severe constipation and Bladder dysfunction:  frequent toileting, ambulate to bathroom with assistance, check post void residuals. Check for C.difficile x1 if >2 loose stools in 24 hours, continue bowel & bladder program.  Monitor bowel and bladder function. Lactinex 2 PO every AC. MOM prn, Brown Bomb prn, Glycerin suppository prn, enema prn. Status post digital disimpaction. I prescribed Miralax daily. 3. Severe right hip and right wrist pain generalized OA pain: reassess pain every shift and prior to and after each therapy session, give prn Tylenol and scheduled Percocet, modalities prn in therapy, Lidoderm, K-pad prn. I reviewed OARRS report, no significant opiates, no significant sedatives. Pain is better with scheduled Percocet, I will monitor for sedation. 4. Skin healing Right hip [place] numbness drainage and breakdown risk:  continue pressure relief program.  Daily skin exams and reports from nursing. Dry sterile dressing to

## 2018-01-15 NOTE — PROGRESS NOTES
Physical Therapy Rehab Treatment Note  Facility/Department: [facility]  Room: F379/P228-38       NAME: Sacha Obando  : 1935 (74 y.o.)  MRN: [id number]  CODE STATUS: Full Code    Date of Service: 1/15/2018  Chart Reviewed: Yes  Patient assessed for rehabilitation services?: Yes  Family / Caregiver Present: No  General Comment  Comments: my right eye feels a little better    Restrictions:  Restrictions/Precautions: Fall Risk  Lower Extremity Weight Bearing Restrictions  Right Lower Extremity Weight Bearing: Weight Bearing As Tolerated  Upper Extremity Weight Bearing Restrictions  Right Upper Extremity Weight Bearing: Weight Bearing As Tolerated  Left Upper Extremity Weight Bearing: Weight Bearing As Tolerated  Body mass index is 33.05 kg/m².      SUBJECTIVE: Subjective: i feel a little stronger this pm  Pain Screening  Patient Currently in Pain: No  Pre Treatment Pain Screening  Pain at present: 0  Scale Used: Numeric Score  Intervention List: Patient able to continue with treatment    Post Treatment Pain Screenin  Pain Assessment  Pain Assessment: 0-10  Pain Level: 0    OBJECTIVE:   Overall Orientation Status: Within Normal Limits  Neuromuscular Education  NDT Treatment: Standing  Neuromuscular Comments: static standing with and without support    Transfers  Sit to Stand: Contact guard assistance;Stand by assistance  Stand to sit: Contact guard assistance;Stand by assistance  Stand Pivot Transfers: Minimal Assistance    Ambulation  Ambulation?: Yes  Ambulation 1  Surface: carpet  Device: Rolling Walker;Platform Walker right  Assistance: Stand by assistance  Quality of Gait: inconsistent step length, varying brandi, flexed trunk  Distance: 120 feet  Comments: distance limited by fatigue        Activity Tolerance  Activity Tolerance: Patient limited by fatigue    Exercises  Quad Sets: x 20  Gluteal Sets: x 20  Knee Short Arc Quad: x 20   Knee Active Range of Motion: x 10  Knee Passive Range of Motion: x

## 2018-01-15 NOTE — PROGRESS NOTES
Physical Therapy Rehab Treatment Note  Facility/Department: [facility]  Room: H788/P202-78       NAME: Jana Bah  : 1935 (89 y.o.)  MRN: [id number]  CODE STATUS: Full Code    Date of Service: 1/15/2018  Chart Reviewed: Yes  Family / Caregiver Present: No  General Comment  Comments: my nose is bleeding and my right eye is irritated. Restrictions:  Restrictions/Precautions: Fall Risk  Lower Extremity Weight Bearing Restrictions  Right Lower Extremity Weight Bearing: Weight Bearing As Tolerated  Upper Extremity Weight Bearing Restrictions  Right Upper Extremity Weight Bearing: Weight Bearing As Tolerated  Left Upper Extremity Weight Bearing: Weight Bearing As Tolerated  Body mass index is 33.05 kg/m². SUBJECTIVE: Subjective: i've been in bed most of the weekend. Pain Screening  Patient Currently in Pain: Denies  Pre Treatment Pain Screening  Pain at present: 0  Scale Used: Numeric Score    Post Treatment Pain Screenin  Pain Assessment  Pain Assessment: 0-10  Pain Level: 0    OBJECTIVE:   Overall Orientation Status: Within Normal Limits      Neuromuscular Education  NDT Treatment: Standing  Neuromuscular Comments: static standing with and without support  Transfers  Sit to Stand: Contact guard assistance;Stand by assistance  Stand to sit: Contact guard assistance;Stand by assistance    Ambulation  Ambulation?: Yes  Ambulation 1  Surface: carpet  Device: Rolling Walker;Platform Walker right  Assistance: Stand by assistance  Quality of Gait: inconsistent step length, varying brandi, flexed trunk  Distance: 120 feet  Comments: distance limited by fatigue      Activity Tolerance  Activity Tolerance: Patient limited by fatigue  ASSESSMENT/COMMENTS:tolerated session well, is a little fatigued from being in bed this weekend. PLAN OF CARE/Safety:   Safety Devices  Type of devices:  All fall risk precautions in place      Therapy Time:   Individual   Time In 0900   Time Out 0930   Minutes 30 Minutes:30      Transfer/Bed mobility training:10      Gait training:10      Neuro re education:10     Therapeutic ex:      Allyson Albert PTA, 01/15/18 at 9:32 AM

## 2018-01-15 NOTE — PROGRESS NOTES
Hospitalist Progress Note      PCP: Brad Shaen DO    Date of Admission: 1/2/2018    Chief Complaint:  Hip fx    [unfilled]    Subjective/Interval history: Patient seen and evaluated at the bedside. Working well with PT/OT. No questions or concerns. Afebrile, vitals stable. Nosebleed tonight, packed with gauze 4x4. Continued on xarelto. Educated to notify nursing of any further bleeding. ROS:  Review of Systems   Constitutional: Negative for chills and fever. Respiratory: Negative for cough, sputum production, shortness of breath and wheezing. Cardiovascular: Negative for chest pain and palpitations. Gastrointestinal: Negative for constipation, diarrhea, nausea and vomiting. Medications:  Reviewed    Infusion Medications    dextrose       Scheduled Medications    rivaroxaban  15 mg Oral BID WC    pantoprazole  40 mg Oral QAM AC    polyethylene glycol  17 g Oral Daily    aspirin  81 mg Oral Daily    oxyCODONE-acetaminophen  1 tablet Oral TID    lidocaine  3 patch Transdermal Daily    cyanocobalamin  1,000 mcg Intramuscular Weekly    coenzyme Q10  100 mg Oral BID    docusate sodium  100 mg Oral BID    atorvastatin  10 mg Oral Daily    cinnamon oil  1 drop Oral 4x Daily     PRN Meds: acetaminophen, dextrose, dextrose, glucagon (rDNA), oxyCODONE-acetaminophen **OR** oxyCODONE-acetaminophen, calcium carbonate    No intake or output data in the 24 hours ending 01/14/18 6506    Exam:    /70   Pulse 74   Temp 98 °F (36.7 °C) (Oral)   Resp 18   Ht 5' 7\" (1.702 m)   Wt 211 lb (95.7 kg)   SpO2 96%   BMI 33.05 kg/m²     Constitutional: alert, appears stated age and cooperative  Skin: Skin color, texture, turgor normal. No rashes or lesions  Eyes:Eye: Normal external eye, conjunctiva, TORI. ENT: Head: Normocephalic, no lesions, without obvious abnormality.   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 Type 2 diabetes: Glucose levels relatively well-controlled at this time    History of TIA in the past: Continue statin recommend adding antiplatelet agent 81 milligram aspirin daily    SVT: Continue Xarelto 15 BID for 21 days, then change to 20mg daily for 21 days. CBC, BMP today when starting anticoagulation. CBC in am for epistaxis. US shows:    NEGATIVE FOR ACUTE DVT OF THE LEFT LOWER EXTREMITY FROM THE GROIN TO THE KNEE. INCIDENTAL THROMBUS NOTED WITHIN THE LEFT GREATER AND LESSER SAPHENOUS VEINS. Dizziness lightheadedness: CBC BMP pending today. Encourage oral intake of water initial orthostatic vitals negative. Patient has been receiving scheduled Percocet however does feel that Tylenol is controlling her pain. This point recommend decreasing narcotic analgesia as tolerated this may be contributing to dizziness and lightheadedness. Additional work up or/and treatment plan may be added today or then after based on clinical progression. I am managing a portion of pt care. Some medical issues are handled by other specialists. Additional work up and treatment should be done in out pt setting by pt PCP and other out pt providers. In addition to examining and evaluating pt, I spent additional time explaining care, normal and abnormal findings, and treatment plan. All of pt questions were answered. Counseling, diet and education were  provided.  Case will be

## 2018-01-15 NOTE — PROGRESS NOTES
Motion: x 10  Ankle Pumps: x 20  Physio/Swiss Ball: elevated legs on swiss ball for comfort and positional change     ASSESSMENT/COMMENTS:pt states she has more energy this pm and wants to ambulate with the ww without platform attachment. PLAN OF CARE/Safety:   Safety Devices  Type of devices:  All fall risk precautions in place      Therapy Time:   Individual   Time In 1430   Time Out 1500   Minutes 30     Minutes:30      Transfer/Bed mobility training:10      Gait training:      Neuro re education:     Therapeutic ex:20      Rajendra Acevedo PTA, 01/15/18 at 2:58 PM

## 2018-01-16 LAB
ANA INTERPRETATION: NORMAL
ANTI-NUCLEAR ANTIBODY (ANA): NEGATIVE

## 2018-01-16 PROCEDURE — 1180000000 HC REHAB R&B

## 2018-01-16 PROCEDURE — 99232 SBSQ HOSP IP/OBS MODERATE 35: CPT | Performed by: PHYSICAL MEDICINE & REHABILITATION

## 2018-01-16 PROCEDURE — 97116 GAIT TRAINING THERAPY: CPT

## 2018-01-16 PROCEDURE — 97535 SELF CARE MNGMENT TRAINING: CPT

## 2018-01-16 PROCEDURE — 97110 THERAPEUTIC EXERCISES: CPT | Performed by: INTERNAL MEDICINE

## 2018-01-16 PROCEDURE — 6370000000 HC RX 637 (ALT 250 FOR IP): Performed by: PHYSICAL MEDICINE & REHABILITATION

## 2018-01-16 PROCEDURE — 6370000000 HC RX 637 (ALT 250 FOR IP): Performed by: NURSE PRACTITIONER

## 2018-01-16 PROCEDURE — 97110 THERAPEUTIC EXERCISES: CPT

## 2018-01-16 RX ADMIN — ATORVASTATIN CALCIUM 10 MG: 10 TABLET, FILM COATED ORAL at 22:02

## 2018-01-16 ASSESSMENT — PAIN DESCRIPTION - PAIN TYPE: TYPE: ACUTE PAIN

## 2018-01-16 ASSESSMENT — PAIN SCALES - GENERAL
PAINLEVEL_OUTOF10: 0
PAINLEVEL_OUTOF10: 0

## 2018-01-16 NOTE — HOME CARE
Date: 12/20/17     OUTPATIENT TESTING                                   [] [facility]         [] Other Tests                                    Home Care Coordinator: RN Signature Electronically signed by Ward Officer, RN on 1/16/18 at 3:32 PM   1/16/2018

## 2018-01-16 NOTE — PROGRESS NOTES
Individual Concurrent Group Co-treatment   Time In 1300         Time Out 1400         Minutes 60            Variance: 30  Reason: Med Hold (Pt. eating lunch and on phone)    YEIMY Mcelroy Electronically signed by YEIMY Mcelroy on 1/16/2018 at 4:21 PM

## 2018-01-16 NOTE — PROGRESS NOTES
Subjective: The patient complains of moderate to severe acute  right wrist and right hip pain partially relieved by Lidoderm, rest, splinting, ice, Percocet and exacerbated by  weightbearing and range of motion and palpation. She complains of repeat nose bleed improving with Afrin, now with [employer]. She complains of clot related left lower extremity pain, okay to due Lidoderm and K-pad. She will be discussed again on Thursday at the Treatment Team meeting, she may need to stay longer. ROS x10: The patient also complains of severely impaired mobility and activities of daily living. Otherwise no new problems with vision, hearing, nose, mouth, throat, dermal, cardiovascular, GI, , pulmonary, musculoskeletal, psychiatric or neurological. See Rehab H&P on Rehab chart dated 01/03/18. Vital signs:  BP (!) 152/77   Pulse 78   Temp 98.1 °F (36.7 °C) (Oral)   Resp 18   Ht 5' 7\" (1.702 m)   Wt 211 lb (95.7 kg)   SpO2 97%   BMI 33.05 kg/m²   I/O:   PO/Intake:  fair PO intake, no problems observed or reported. Bowel/Bladder:  continent, severe constipation. General:  Patient is well developed, adequately nourished, non-obese and     well kempt. HEENT:    PERRLA, hearing intact to loud voice, external inspection of ear     and nose benign. Inspection of lips, tongue and gums benign. Nose bleed. Musculoskeletal: No significant change in strength or tone. All joints stable. Inspection and palpation of digits and nails show no clubbing,       cyanosis or inflammatory conditions. Neuro/Psychiatric: Affect: Bright and pleasant. Alert and oriented to person, place and     situation. No significant change in deep tendon reflexes or     Sensation-she has subtle cognitive deficits  Lungs:  Diminished CTA-B. Respiration effort is normal at rest.     Heart:   S1 = S2, RRR. No loud murmurs.     Abdomen:  Soft, non-tender, no enlargement of liver or spleen. Extremities:  significant lower extremity edema and right hip tenderness. Right wrist is less tender.-Superficial clot in the saphenous on the left with of the calf level of bilevel     Non-tender left thigh mass. Skin:    Significant servicing demonstration of the right hip incision mild bilateral upper extremity bruising    Rehabilitation:  Physical therapy: FIMS:  Bed Mobility: Supine to Sit: Minimal assistance  Sit to Supine: Stand by assistance  Scooting: Minimal assistance    Transfers: Sit to Stand: Supervision  Stand to sit: Supervision  Bed to Chair: Contact guard assistance  Stand Pivot Transfers: Minimal Assistance, Ambulation 1  Surface: level tile, carpet  Device: Rolling Walker  Assistance: Stand by assistance  Quality of Gait: Increased antalgic gait but able to tolerate WBing with R wrist on [employer]. Distance: 160ft  Comments: Progressed gait to [employer] without R platform.  , Stairs  # Steps : 4  Stairs Height: 6\"  Rails: Bilateral  Assistance: Stand by assistance  Comment: VCs x1 for LE to lead with. FIMS: Bed, Chair, Wheel Chair: 5 - Requires setup/supervision/cues  Walk: 2 - Maximal Assistance Requires up to Maximal Assistance AND requires assistance of one person to walk/operate wheelchair between  feet (Patient performs 25-49% of locomotion effort or goes between  feet)  Distance Walked: 125ft  Wheel Chair: 2 - Maximal Assistance Requires up to [address] requires assistance of one person to walk/operate wheelchair between [address] in [address]: 0  Stairs: 2- Maximal Assistance Performs 25-49% of the effort to go up and down 4 to 6 stairs and requires the assistance of one person only,  , Assessment: Increased gait distance today. Required assist and cues to follow hip prec with bed mobility.       Occupational therapy: FIMS:  Eatin - Feeds self with setup/supervision/cues and/or requires only setup/supervision/cues to perform tube feedings  Groomin - Requires setup/cues to do all tasks  Bathing: 3 - Able to bathe 5-7 areas  Dressing-Upper: 5 - Requires setup/supervision/cues and/or requires assist with presthesis/brace only  Dressing-Lower: 3 - Requires assist with 2-3 parts of dressing  Toiletin - Requires steadying assistance only  Toilet Transfer: 5 - Requires setup/supervision/cues  Tub Transfer: 0 - Activity does not occur  Shower Transfer: 0 - Activity does not occur,  , Assessment: Pt. demonstrated decreased balance, strength, endurance and recall of precautions, which impacts her ability to safely perform ADL tasks. Pt. would benefit from continued skilled OT to assess these deficits and promote increased independence with ADL tasks. Speech therapy: FIMS: Comprehension: 6 - Complex ideas 90% or device (hearing aid/glasses)  Expression: 6 - Device used to express complex ideas/needs  Social Interaction: 7 - Patient has appropriate behavior/relations 100% of the time  Problem Solvin - Independent with device (e.g. notes, schedules)  Memory: 6 - Patient requires device to recall (e.g. memory book)      Lab/X-ray studies reviewed, analyzed and discussed with patient and staff:   No results found for this or any previous visit (from the past 24 hour(s)). Xr Femur Right (min 2 Views)  Result Date: 2017  CONCLUSION: RIGHT FEMORAL NECK FRACTURE WITH VARUS DEFORMITY. SMALL BIBASILAR DEPENDENT PULMONARY ATELECTASIS. Xr Knee Right (3 Views)  Result Date: 2017  CONCLUSION: RIGHT FEMORAL NECK FRACTURE WITH VARUS DEFORMITY. SMALL BIBASILAR DEPENDENT PULMONARY ATELECTASIS. Previous extensive, complex labs, notes and diagnostics reviewed and analyzed. ALLERGIES:    Allergies as of 2018 - Review Complete 2018   Allergen Reaction Noted    Cephalexin  2011      (please also verify by checking [facility])      Complex Physical Medicine & Rehab Issues Assess & Plan:   1.  Severe abnormality of gait and mobility and impaired self-care and ADL's secondary to acute right hip fracture status post right ANN with severe right wrist strain/sprain. Functional and medical status reassessed regarding patients ability to participate in therapies and patient found to be able to participate in acute intensive comprehensive inpatient rehabilitation program including PT/OT to improve balance, ambulation, ADLs, and to improve the P/AROM. Therapeutic modifications regarding activities in therapies, place, amount of time per day and intensity of therapy made daily. In bed therapies or bedside therapies prn.   2. Bowel severe constipation and Bladder dysfunction:  frequent toileting, ambulate to bathroom with assistance, check post void residuals. Check for C.difficile x1 if >2 loose stools in 24 hours, continue bowel & bladder program.  Monitor bowel and bladder function. Lactinex 2 PO every AC. MOM prn, Brown Bomb prn, Glycerin suppository prn, enema prn. Status post digital disimpaction. I prescribed Miralax daily. 3. Severe right hip and right wrist pain generalized OA pain, clot related left lower extremity pain: reassess pain every shift and prior to and after each therapy session, give prn Tylenol and scheduled Percocet, modalities prn in therapy, Lidoderm, K-pad prn. I reviewed OARRS report, no significant opiates, no significant sedatives. Pain is better with scheduled Percocet, I will monitor for sedation. 4. Skin healing Right hip Matheny numbness drainage and breakdown risk:  continue pressure relief program.  Daily skin exams and reports from nursing. Dry sterile dressing to the right hip every shift add CoQ10  5. Severe Fatigue due to nutritional and hydration deficiency:  continue to monitor I&Os, calorie counts prn, dietary consult prn. Add vitamin B12 CoQ10 and vitamin D  6. Acute episodic insomnia with situational adjustment disorder:  prn Ambien, monitor for day time sedation.   7. Falls risk

## 2018-01-17 LAB
BASOPHILS ABSOLUTE: 0.1 K/UL (ref 0–0.2)
BASOPHILS RELATIVE PERCENT: 1 %
EOSINOPHILS ABSOLUTE: 0.3 K/UL (ref 0–0.7)
EOSINOPHILS RELATIVE PERCENT: 4.2 %
HCT VFR BLD CALC: 25.7 % (ref 37–47)
HEMOGLOBIN: 8.6 G/DL (ref 12–16)
LYMPHOCYTES ABSOLUTE: 1.1 K/UL (ref 1–4.8)
LYMPHOCYTES RELATIVE PERCENT: 17.8 %
MCH RBC QN AUTO: 30 PG (ref 27–31.3)
MCHC RBC AUTO-ENTMCNC: 33.5 % (ref 33–37)
MCV RBC AUTO: 89.4 FL (ref 82–100)
MONOCYTES ABSOLUTE: 0.7 K/UL (ref 0.2–0.8)
MONOCYTES RELATIVE PERCENT: 10.9 %
NEUTROPHILS ABSOLUTE: 4.2 K/UL (ref 1.4–6.5)
NEUTROPHILS RELATIVE PERCENT: 66.1 %
PDW BLD-RTO: 14.7 % (ref 11.5–14.5)
PLATELET # BLD: 436 K/UL (ref 130–400)
RBC # BLD: 2.88 M/UL (ref 4.2–5.4)
WBC # BLD: 6.4 K/UL (ref 4.8–10.8)

## 2018-01-17 PROCEDURE — 92508 TX SP LANG VOICE COMM GROUP: CPT

## 2018-01-17 PROCEDURE — 97535 SELF CARE MNGMENT TRAINING: CPT

## 2018-01-17 PROCEDURE — 36415 COLL VENOUS BLD VENIPUNCTURE: CPT

## 2018-01-17 PROCEDURE — 99232 SBSQ HOSP IP/OBS MODERATE 35: CPT | Performed by: PHYSICAL MEDICINE & REHABILITATION

## 2018-01-17 PROCEDURE — 6370000000 HC RX 637 (ALT 250 FOR IP): Performed by: INTERNAL MEDICINE

## 2018-01-17 PROCEDURE — 85025 COMPLETE CBC W/AUTO DIFF WBC: CPT

## 2018-01-17 PROCEDURE — 6370000000 HC RX 637 (ALT 250 FOR IP): Performed by: PHYSICAL MEDICINE & REHABILITATION

## 2018-01-17 PROCEDURE — 1180000000 HC REHAB R&B

## 2018-01-17 PROCEDURE — 97530 THERAPEUTIC ACTIVITIES: CPT

## 2018-01-17 PROCEDURE — 6370000000 HC RX 637 (ALT 250 FOR IP): Performed by: NURSE PRACTITIONER

## 2018-01-17 PROCEDURE — 97110 THERAPEUTIC EXERCISES: CPT

## 2018-01-17 PROCEDURE — 97116 GAIT TRAINING THERAPY: CPT

## 2018-01-17 RX ADMIN — RIVAROXABAN 15 MG: 15 TABLET, FILM COATED ORAL at 16:13

## 2018-01-17 RX ADMIN — ATORVASTATIN CALCIUM 10 MG: 10 TABLET, FILM COATED ORAL at 21:30

## 2018-01-17 RX ADMIN — ASPIRIN 81 MG: 81 TABLET, COATED ORAL at 10:22

## 2018-01-17 ASSESSMENT — PAIN SCALES - GENERAL
PAINLEVEL_OUTOF10: 0

## 2018-01-17 NOTE — PROGRESS NOTES
Occupational Therapy  Facility/Department: [facility]  Daily Treatment Note  NAME: Therese Means  : 1935  MRN: [id number]    Date of Service: 2018    Patient Diagnosis(es): There were no encounter diagnoses. has a past medical history of Abnormality of gait and mobility; Chronic kidney disease; Closed fracture of distal end of left radius; CVA (cerebral vascular accident) ([facility] [address] Essential hypertension; Fracture of hip, closed, right, initial encounter ([facility] [address] H/O total hip arthroplasty, right; History of TIA (transient ischemic attack); Hypercholesteremia; Hyperlipidemia; Obesity (BMI 30.0-34.9); Panic attacks; Postoperative anemia; Type 2 diabetes mellitus without complication ([facility] [address] Urinary incontinence; Venous stasis dermatitis; and Vitamin D deficiency. has a past surgical history that includes Appendectomy (); Upper gastrointestinal endoscopy (03); Colonoscopy; eye surgery; fracture surgery; and partial hip replacement (Right, 2017).     Restrictions  Restrictions/Precautions  Restrictions/Precautions: Fall Risk  Lower Extremity Weight Bearing Restrictions  Right Lower Extremity Weight Bearing: Weight Bearing As Tolerated  Upper Extremity Weight Bearing Restrictions  Right Upper Extremity Weight Bearing: Weight Bearing As Tolerated  Left Upper Extremity Weight Bearing: Weight Bearing As Tolerated  Other: RUE Splint for comfort during ambulation per Dr. Katheryn Agudelo; x-ray negative for fx  Position Activity Restriction  Hip Precautions: Posterior hip precautions  Other position/activity restrictions: Pt able to recall 3/3 hip precautions with verbal cues  Subjective   General  Chart Reviewed: Yes  Patient assessed for rehabilitation services?: Yes  Referring Practitioner: Dr. Katheryn Agudelo  Diagnosis: Right femoral neck fx s/p fall with R ANN on 17 with impaired mobility  Pre Treatment Pain Screening  Pain at present: 0  Scale Used: Numeric Score  Intervention List: Patient able to continue with treatment  Pain Assessment  Patient Currently in Pain: No  Pain Assessment: 0-10  Pain Level: 0  Vital Signs  Patient Currently in Pain: No   Orientation     Objective    Pt. was able to complete seated cognitive task, assembling 'Perfection' game. Pt. had min difficulty and required increased time and min cuing to appropriately manipulate pieces and place into board. Pt. also was able to place/remove wooden washers and dowels in vertical renaldo with min difficulty and occasional rest breaks. Balance  Standing Balance: Modified independent   Standing Balance  Time: 1 x 10 minutes, 1 x 8 minutes  Activity: Pt. stood to place \"Topple\" game pieces, using alternating UE with no LOB and no difficulty. Pt. was also able to place/remove playing cards and clothespins from yardstick. Pt. had no LOB during any standing tasks today and demonstrates increased balance and endurance. Sit to stand: Modified independent  Stand to sit: Modified independent     Transfers  Sit to stand: Modified independent  Stand to sit: Modified independent      Assessment      Discharge Recommendations: Continue to assess pending progress  Activity Tolerance  Activity Tolerance: Patient Tolerated treatment well  Safety Devices  Safety Devices in place: Yes  Type of devices: All fall risk precautions in place       Discharge Recommendations:  Continue to assess pending progress     Plan   Plan  Times per week: 5-7x/week, 15-60 minutes/day  Times per day: Daily  Plan weeks: 1 1/2-2  Current Treatment Recommendations: Strengthening, Balance Training, Endurance Training, Self-Care / ADL, Safety Education & Training, Patient/Caregiver Education & Training, Equipment Evaluation, Education, & procurement, Home Management Training, Cognitive/Perceptual Training  Plan Comment: Continue per OT POC   G-Code     OutComes Score  AM-PAC Score     Goals  Patient Goals   Patient goals :  \"Get better and go home\"       Therapy Time   Individual Concurrent Group Co-treatment   Time In 1330         Time Out 1400         Minutes [email or web address] HAO Juarez/L Electronically signed by HAO Santos/L on 1/17/2018 at 4:30 PM

## 2018-01-17 NOTE — PROGRESS NOTES
spleen. Extremities:  significant lower extremity edema and right hip tenderness. Right wrist is less tender.-Superficial clot in the saphenous on the left with of the calf level of bilevel     Non-tender left thigh mass. Skin:    Significant servicing demonstration of the right hip incision mild bilateral upper extremity bruising    Rehabilitation:  Physical therapy: FIMS:  Bed Mobility: Supine to Sit: Minimal assistance  Sit to Supine: Stand by assistance  Scooting: Minimal assistance    Transfers: Sit to Stand: Stand by assistance  Stand to sit: Stand by assistance  Bed to Chair: Stand by assistance (with [employer])  Stand Pivot Transfers: Minimal Assistance, Ambulation 1  Surface: level tile, carpet  Device: Rolling Walker  Assistance: Stand by assistance  Quality of Gait: Recip pattern. Mild antalgic gait. Tolerated WBing through R UE well. VCs for upright posture. Fatigued after gait.     Distance: 150ft  Comments: Progressed gait to [employer] without R platform.  , Stairs  # Steps : 4  Stairs Height: 6\"  Rails: Bilateral  Assistance: Stand by assistance  Comment: VCs for LE to lead with ascending x2    FIMS: Bed, Chair, Wheel Chair: 5 - Requires setup/supervision/cues  Walk: 2 - Maximal Assistance Requires up to [address] requires assistance of one person to walk/operate wheelchair between  feet (Patient performs 25-49% of locomotion effort or goes between  feet)  Distance Walked: 125ft  Wheel Chair: 2 - [address] up to [address] requires assistance of one person to walk/operate wheelchair between [address] in [address] [facility] [facility] [address]: 0  Stairs: 2- Maximal Assistance Performs 25-49% of the effort to go up and down 4 to 6 stairs and requires the assistance of one person only,  , Assessment: Increased fatigue today requiring more rest breaks and cues for fatigue    Occupational therapy: FIMS:  Eatin - Feeds self with setup/supervision/cues and/or therapy, Lidoderm, K-pad prn. I reviewed OARRS report, no significant opiates, no significant sedatives. Pain is better with scheduled Percocet, I will monitor for sedation. 4. Skin healing Right hip Steve numbness drainage and breakdown risk:  continue pressure relief program.  Daily skin exams and reports from nursing. Dry sterile dressing to the right hip every shift add CoQ10  5. Severe Fatigue due to nutritional and hydration deficiency:  continue to monitor I&Os, calorie counts prn, dietary consult prn. Add vitamin B12 CoQ10 and vitamin D  6. Acute episodic insomnia with situational adjustment disorder:  prn Ambien, monitor for day time sedation. 7. Falls risk elevated:  patient to use call light to get nursing assistance to get up, bed and chair alarm. 8. Significant Left lobe or extremity superficial thromboembolism and DVT risk: progressive activities in PT, continue prophylaxis OTONIEL hose, elevation. [place] [facility] for extensive superficial painful thrombophlebitis . Consult heme on current to rule out hypercoagulable state and to transition off Xaralto to lower doses and to monitor for true DVT. Hold Xarelto and ASA due to nose bleed 01/15/18. 9. Complex discharge planning:  Discharge date is set for Fri., 01/19/18 to home alone with occasional supervision and home health PT, OT, RN, aide and  versus SNF. I hope to have her using a regular wheeled walker at discharge. Team meeting Thursday to assess progress towards goals, discuss and address social, psychological and medical comorbidities and to address difficulties they may be having progressing in therapy. Patient and family education is in progress. The patient is to follow-up with their family physician after discharge. Complex Active General Medical Issues that complicate care Assess & Plan:    1.  Fracture, hip, right, open type I or II, -status post right total hip arthroplasty - weightbearing as tolerated, up with assist, voiding, check postvoid residual, UA on 01/09/18 shows few bacteria, check urine C&S. 11.   Obesity (BMI 30.0-34.9) - Hospital diet, monitor weight, follow-up with family physician after discharge. 12. Mild dementia -  Moderate cognitive deficits recognize status post assessment with SLP. Will add memory exercises memory no plaque and limited toxic medications. 13. Light headedness - relieved with abdominal binder and OTONIEL hose. 14. New non-tender left thigh mass, extensive superficial saphenous clot left thigh, left calf - Hospitalist prescribed Xarelto 15mg BID - hold Xarelto and ASA due to nose bleed 01/15/18. Hold therapy for 24 hours. Hematology note reviewed  15. Repeat nose bleed -  status post  [employer]. Continue, Ocean Nasal Spray. I reordered Afrin Nasal Spray.              This note transcribed by Miles Shoemaker on 01/17/18 at 11:22 a.m.           Indio Garcia D.O., PM&R     Attending    [address] [place], [place] [address]elias Pabon

## 2018-01-17 NOTE — PROGRESS NOTES
Occupational Therapy  Facility/Department: [facility]  Daily Treatment Note  NAME: Naomi Quigley  : 1935  MRN: [id number]    Date of Service: 2018    Patient Diagnosis(es): There were no encounter diagnoses. has a past medical history of Abnormality of gait and mobility; Chronic kidney disease; Closed fracture of distal end of left radius; CVA (cerebral vascular accident) ([facility] [address] Essential hypertension; Fracture of hip, closed, right, initial encounter ([facility] H/O total hip arthroplasty, right; History of TIA (transient ischemic attack); Hypercholesteremia; Hyperlipidemia; Obesity (BMI 30.0-34.9); Panic attacks; Postoperative anemia; Type 2 diabetes mellitus without complication ([facility] Urinary incontinence; Venous stasis dermatitis; and Vitamin D deficiency. has a past surgical history that includes Appendectomy (); Upper gastrointestinal endoscopy (03); Colonoscopy; eye surgery; fracture surgery; and partial hip replacement (Right, 2017).     Restrictions  Restrictions/Precautions  Restrictions/Precautions: Fall Risk  Lower Extremity Weight Bearing Restrictions  Right Lower Extremity Weight Bearing: Weight Bearing As Tolerated  Upper Extremity Weight Bearing Restrictions  Right Upper Extremity Weight Bearing: Weight Bearing As Tolerated  Left Upper Extremity Weight Bearing: Weight Bearing As Tolerated  Other: RUE Splint for comfort during ambulation per Dr. Indiaan Edge; x-ray negative for fx  Position Activity Restriction  Hip Precautions: Posterior hip precautions  Other position/activity restrictions: Pt able to recall 2/3/ hip precations  Subjective   General  Chart Reviewed: Yes  Patient assessed for rehabilitation services?: Yes  Referring Practitioner: Dr. Indiana Edge  Diagnosis: Right femoral neck fx s/p fall with R ANN on 17 with impaired mobility  Pre Treatment Pain Screening  Pain at present: 0  Scale Used: Numeric Score  Intervention List: Patient able to continue with treatment  Pain Assessment  Patient Currently in Pain: No  Pain Assessment: 0-10  Pain Level: 0  Vital Signs  Patient Currently in Pain: No   Orientation     Objective      Pt. requested to brush her teeth 2° just finished breakfast.     ADL  Grooming: Modified independent  (Therapist wheeled pt. to sink, pt cleaned teeth independently with increased time)      In therapy department, pt. completed problem solving task, placing/removing wooden puzzle pieces on vertical puzzle renaldo with min difficulty to rotate pieces. Pt. had no difficulty with 1lb wrist weight on LUE, no  weight on RUE. Pt. also sorted washers onto dowels in standing with SBA x 5 minutes without difficulty. Assessment      Discharge Recommendations: Continue to assess pending progress  Activity Tolerance  Activity Tolerance: Patient Tolerated treatment well  Safety Devices  Safety Devices in place: Yes  Type of devices: All fall risk precautions in place       Discharge Recommendations:  Continue to assess pending progress     Plan   Plan  Times per week: 5-7x/week, 15-60 minutes/day  Times per day: Daily  Plan weeks: 1 1/2-2  Current Treatment Recommendations: Strengthening, Balance Training, Endurance Training, Self-Care / ADL, Safety Education & Training, Patient/Caregiver Education & Training, Equipment Evaluation, Education, & procurement, Home Management Training, Cognitive/Perceptual Training  Plan Comment: Continue per OT POC   G-Code     OutComes Score  AM-PAC Score  Goals  Patient Goals   Patient goals :  \"Get better and go home\"       Therapy Time   Individual Concurrent Group Co-treatment   Time In 0930         Time Out 1000         Minutes [address] HAO Tejeda/L Electronically signed by YEIMY Almazan on 1/17/2018 at 3:02 PM

## 2018-01-18 LAB
ANION GAP SERPL CALCULATED.3IONS-SCNC: 12 MEQ/L (ref 7–13)
BASOPHILS ABSOLUTE: 0.1 K/UL (ref 0–0.2)
BASOPHILS RELATIVE PERCENT: 0.9 %
BUN BLDV-MCNC: 20 MG/DL (ref 8–23)
CALCIUM SERPL-MCNC: 9.6 MG/DL (ref 8.6–10.2)
CHLORIDE BLD-SCNC: 108 MEQ/L (ref 98–107)
CO2: 22 MEQ/L (ref 22–29)
CREAT SERPL-MCNC: 0.7 MG/DL (ref 0.5–0.9)
EOSINOPHILS ABSOLUTE: 0.3 K/UL (ref 0–0.7)
EOSINOPHILS RELATIVE PERCENT: 4.9 %
GFR AFRICAN AMERICAN: >60
GFR NON-AFRICAN AMERICAN: >60
GLUCOSE BLD-MCNC: 127 MG/DL (ref 74–109)
HCT VFR BLD CALC: 25.2 % (ref 37–47)
HEMOGLOBIN: 8.6 G/DL (ref 12–16)
LYMPHOCYTES ABSOLUTE: 1.1 K/UL (ref 1–4.8)
LYMPHOCYTES RELATIVE PERCENT: 19.4 %
MCH RBC QN AUTO: 29.9 PG (ref 27–31.3)
MCHC RBC AUTO-ENTMCNC: 34.1 % (ref 33–37)
MCV RBC AUTO: 87.6 FL (ref 82–100)
MONOCYTES ABSOLUTE: 0.7 K/UL (ref 0.2–0.8)
MONOCYTES RELATIVE PERCENT: 11.1 %
NEUTROPHILS ABSOLUTE: 3.7 K/UL (ref 1.4–6.5)
NEUTROPHILS RELATIVE PERCENT: 63.7 %
PDW BLD-RTO: 14.6 % (ref 11.5–14.5)
PLATELET # BLD: 419 K/UL (ref 130–400)
POTASSIUM SERPL-SCNC: 3.9 MEQ/L (ref 3.5–5.1)
RBC # BLD: 2.87 M/UL (ref 4.2–5.4)
SODIUM BLD-SCNC: 142 MEQ/L (ref 132–144)
WBC # BLD: 5.9 K/UL (ref 4.8–10.8)

## 2018-01-18 PROCEDURE — 6370000000 HC RX 637 (ALT 250 FOR IP): Performed by: PHYSICAL MEDICINE & REHABILITATION

## 2018-01-18 PROCEDURE — 99233 SBSQ HOSP IP/OBS HIGH 50: CPT | Performed by: PHYSICAL MEDICINE & REHABILITATION

## 2018-01-18 PROCEDURE — 97110 THERAPEUTIC EXERCISES: CPT

## 2018-01-18 PROCEDURE — 97530 THERAPEUTIC ACTIVITIES: CPT

## 2018-01-18 PROCEDURE — 97535 SELF CARE MNGMENT TRAINING: CPT

## 2018-01-18 PROCEDURE — 85025 COMPLETE CBC W/AUTO DIFF WBC: CPT

## 2018-01-18 PROCEDURE — 92507 TX SP LANG VOICE COMM INDIV: CPT

## 2018-01-18 PROCEDURE — 36415 COLL VENOUS BLD VENIPUNCTURE: CPT

## 2018-01-18 PROCEDURE — 6370000000 HC RX 637 (ALT 250 FOR IP): Performed by: INTERNAL MEDICINE

## 2018-01-18 PROCEDURE — 2500000003 HC RX 250 WO HCPCS: Performed by: PHYSICAL MEDICINE & REHABILITATION

## 2018-01-18 PROCEDURE — 97116 GAIT TRAINING THERAPY: CPT

## 2018-01-18 PROCEDURE — 6370000000 HC RX 637 (ALT 250 FOR IP): Performed by: NURSE PRACTITIONER

## 2018-01-18 PROCEDURE — 80048 BASIC METABOLIC PNL TOTAL CA: CPT

## 2018-01-18 PROCEDURE — 1180000000 HC REHAB R&B

## 2018-01-18 RX ORDER — DOCUSATE SODIUM 100 MG/1
100 CAPSULE, LIQUID FILLED ORAL 2 TIMES DAILY
Status: DISCONTINUED | OUTPATIENT
Start: 2018-01-18 | End: 2018-01-23 | Stop reason: HOSPADM

## 2018-01-18 RX ADMIN — ATORVASTATIN CALCIUM 10 MG: 10 TABLET, FILM COATED ORAL at 19:43

## 2018-01-18 RX ADMIN — MICONAZOLE NITRATE: 20 POWDER TOPICAL at 19:45

## 2018-01-18 RX ADMIN — DOCUSATE SODIUM 100 MG: 100 CAPSULE, LIQUID FILLED ORAL at 12:13

## 2018-01-18 RX ADMIN — MICONAZOLE NITRATE: 20 POWDER TOPICAL at 12:42

## 2018-01-18 RX ADMIN — DOCUSATE SODIUM 100 MG: 100 CAPSULE, LIQUID FILLED ORAL at 19:43

## 2018-01-18 RX ADMIN — RIVAROXABAN 15 MG: 15 TABLET, FILM COATED ORAL at 07:51

## 2018-01-18 ASSESSMENT — PAIN DESCRIPTION - DESCRIPTORS: DESCRIPTORS: PRESSURE

## 2018-01-18 ASSESSMENT — PAIN SCALES - GENERAL
PAINLEVEL_OUTOF10: 0

## 2018-01-18 ASSESSMENT — PAIN DESCRIPTION - ORIENTATION: ORIENTATION: RIGHT

## 2018-01-18 ASSESSMENT — PAIN DESCRIPTION - LOCATION: LOCATION: GROIN

## 2018-01-18 NOTE — PROGRESS NOTES
Subjective: The patient complains of moderate right wrist and right hip pain partially relieved by Lidoderm, rest, splinting, ice, Percocet and exacerbated by  weightbearing and range of motion and palpation. She is feeling better now with the nasal packing out of her nose. Her incision is healing well, staples are out. She complains of yeast rash under left breat, I prescribed Nystatin Powder. She also complains of severe constipation, I prescribed Colace. Her discharge date was extended to 01/23/18. ROS x10: The patient also complains of severely impaired mobility and activities of daily living. Otherwise no new problems with vision, hearing, nose, mouth, throat, dermal, cardiovascular, GI, , pulmonary, musculoskeletal, psychiatric or neurological. See Rehab H&P on Rehab chart dated 01/03/18. Vital signs:  /60   Pulse 70   Temp 96 °F (35.6 °C) (Oral)   Resp 18   Ht 5' 7\" (1.702 m)   Wt 211 lb (95.7 kg)   SpO2 92%   BMI 33.05 kg/m²   I/O:   PO/Intake:  fair PO intake, no problems observed or reported. Bowel/Bladder:  continent, severe constipation. General:  Patient is well developed, adequately nourished, non-obese and     well kempt. HEENT:    PERRLA, hearing intact to loud voice, external inspection of ear and nose benign. Inspection of lips, tongue and gums benign. Musculoskeletal: No significant change in strength or tone. All joints stable. Inspection and palpation of digits and nails show no clubbing,       cyanosis or inflammatory conditions. Neuro/Psychiatric: Affect: Bright and pleasant. Alert and oriented to person, place and     situation. No significant change in deep tendon reflexes or     Sensation-she has subtle cognitive deficits  Lungs:  Diminished CTA-B. Respiration effort is normal at rest.     Heart:   S1 = S2, RRR. No loud murmurs.     Abdomen:  Soft, non-tender, no enlargement of liver or spleen. Extremities:  significant lower extremity edema and right hip tenderness. Right wrist is less tender.-Superficial clot in the saphenous on the left with of the calf level of bilevel     Non-tender left thigh mass. Skin:   Significant servicing demonstration of the right hip incision mild bilateral upper     extremity bruising. Yeast rash under left breast.      Rehabilitation:  Physical therapy: FIMS:  Bed Mobility: Supine to Sit: Minimal assistance  Sit to Supine: Stand by assistance  Scooting: Minimal assistance    Transfers: Sit to Stand: Modified independent, Supervision  Stand to sit: Modified independent, Supervision  Bed to Chair: Modified independent, Supervision  Stand Pivot Transfers: Minimal Assistance  Comment: Discussion with pt regarging confidence level for DC. Pt expressing concerns that she will not be ready to go home alone 1/19/17. With further discussion, pt admits that her confidence is her limiting factor. Agrees that practice will improve her confidence and provided reassurance that her performance was excellent. , Ambulation 1  Surface: level tile, carpet  Device: Rolling Walker  Assistance: Supervision  Quality of Gait: 1 VC to keep eyes up. Pt with near symmetrical step through pattern. Appropriate bracing on [employer]. Slow speed, however no LOB.    Distance: 150ft  Comments: Pt states that sometimes her wrist hurts her when she walks, but that today it is much better, Stairs  # Steps : 4  Stairs Height: 6\"  Rails: Bilateral  Curbs: 4\"  Device: Rolling walker  Assistance: Contact guard assistance (Educated pt on technique with curb step.  )  Comment: VCs for LE to lead with ascending x2    FIMS: Bed, Chair, Wheel Chair: 5 - Requires setup/supervision/cues  Walk: 5 - Supervision Requires standby supervision or cuing to walk/operate wheelchair at least 150 feet  Distance Walked: 150ft  Wheel Chair: 2 - Maximal Assistance Requires up to [address] requires assistance of one person to walk/operate wheelchair between [address] in Wheel Chair: 0  Stairs: 2- Maximal Assistance Performs 25-49% of the effort to go up and down 4 to 6 stairs and requires the assistance of one person only,  , Assessment: Improving and progressing towards goal achievement. Occupational therapy: FIMS:  Eatin - Patient feeds self  Groomin - Requires setup/cues to do all tasks  Bathin - Able to bathe 8-9 areas  Dressing-Upper: 5 - Requires setup/supervision/cues and/or requires assist with presthesis/brace only  Dressing-Lower: 3 - Requires assist with 2-3 parts of dressing  Toiletin - Requires setup/supervision/cues  Toilet Transfer: 6 - Independent with device (grab bar/walker/slide bar)  Tub Transfer: 0 - Activity does not occur  Shower Transfer: 0 - Activity does not occur,  , Assessment: Pt. demonstrated decreased balance, strength, endurance and recall of precautions, which impacts her ability to safely perform ADL tasks. Pt. would benefit from continued skilled OT to assess these deficits and promote increased independence with ADL tasks.     Speech therapy: FIMS: Comprehension: 6 - Complex ideas 90% or device (hearing aid/glasses)  Expression: 7 - Patient expresses complex ideas/needs  Social Interaction: 7 - Patient has appropriate behavior/relations 100% of the time  Problem Solvin - Patient able to solve simple/routine tasks  Memory: 5 - Patient requires prompting with stress/unfamiliar situations      Lab/X-ray studies reviewed, analyzed and discussed with patient and staff:   Recent Results (from the past 24 hour(s))   CBC Auto Differential    Collection Time: 18  5:39 AM   Result Value Ref Range    WBC 5.9 4.8 - 10.8 K/uL    RBC 2.87 (L) 4.20 - 5.40 M/uL    Hemoglobin 8.6 (L) 12.0 - 16.0 g/dL    Hematocrit 25.2 (L) 37.0 - 47.0 %    MCV 87.6 82.0 - 100.0 fL    MCH 29.9 27.0 - 31.3 pg    MCHC 34.1 33.0 - 37.0 %    RDW 14.6 (H) 11.5 - 14.5 %    Platelets dressing to the right hip every shift, add CoQ10.  5. Severe Fatigue due to nutritional and hydration deficiency:  continue to monitor I&Os, calorie counts prn, dietary consult prn. Add vitamin B12 CoQ10 and vitamin D.  6. Acute episodic insomnia with situational adjustment disorder:  prn Ambien, monitor for day time sedation. 7. Falls risk elevated:  patient to use call light to get nursing assistance to get up, bed and chair alarm. 8. Significant Left lobe or extremity superficial thromboembolism and DVT risk: progressive activities in PT, continue prophylaxis OTONIEL falcon, elevation. [place] for extensive superficial painful thrombophlebitis . Consult heme/onc to rule out hypercoagulable state and to transition off Xaralto to lower doses and to monitor for true DVT. Hold Xarelto and ASA due to nose bleed 01/15/18. I discussed Xarelto dosing with Dr. Serena Durand. 9. Complex discharge planning:  Discharge date is set for 01/23/18 to home alone with occasional supervision and home health PT, OT, RN, aide and . I hope to have her using a regular wheeled walker at discharge. Team meeting Thursday to assess progress towards goals, discuss and address social, psychological and medical comorbidities and to address difficulties they may be having progressing in therapy. Patient and family education is in progress. The patient is to follow-up with their family physician after discharge. Complex Active General Medical Issues that complicate care Assess & Plan:    1. Fracture, hip, right, open type I or II, -status post right total hip arthroplasty - weightbearing as tolerated, up with assist, follow-up with orthopedic surgeon. Incision healing well, staples are out. 2.  History of Panic attacks - add recreational therapy, spiritual care, and music therapy, emotional support, adjust/add medications prn. Add adjustment to disability support group.   3.  Type 2 diabetes mellitus, hyperglycemia, Blood sugar 133 without Glucophage - Continue blood sugar checks BID, 1500-calorie ADA diet, adjust/add medications prn.        4. No results for input(s): POCGLU in the last 72 hours. Venous stasis dermatitis, Seborrheic keratosis,  Punctate keratitis - add ET mix bilateral lower extremities and protect her heels by placing them off the bed  4. 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 Check orthostatic BP. I prescribed abdominal binder, OTONIEL hose. 5.   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 The prognosis for a subependymoma is better than for most ependymal tumors, and is considered a grade I tumor in the [address] [facility]) classification. 6.   Severe Right UE pain - no fracture, healed Closed fracture of distal end of left radius now with acute right wrist sprain - upgrade to WBAT, right wrist splint for comfort only. Add lidocaine patch as well as Voltaren gel. Right wrist needs Ace Wrap.   7.   Vitamin D deficiency and osteoporotic fractures - supplement and recheck as outpatient. 8.  Progressive Postoperative anemia -likely secondary to Stvee numbness drainage at incision site recheck CBC. Monitor vital signs, check H&H prn. I prescribed iron. Discontinue daily ferritin levels  9. Chronic kidney disease - recheck BMP, limit toxic medications. Encourage hydration. 10.  Question UTI, Urinary incontinence - Add timed voiding, check postvoid residual, UA on 01/09/18 shows few bacteria, check urine C&S. 11.   Obesity (BMI 30.0-34.9) - Hospital diet, monitor weight, follow-up with family physician after discharge.    12. Mild dementia -  Moderate cognitive deficits recognize status post assessment with SLP. Will add memory exercises memory no plaque and limited toxic medications. 13. Light headedness - relieved with abdominal binder and OTONIEL hose. 14. New non-tender left thigh mass, extensive superficial saphenous clot left thigh, left calf - Hospitalist prescribed Xarelto 15mg BID - hold Xarelto and ASA due to nose bleed 01/15/18. Hold therapy for 24 hours. Hematology note reviewed  15. Repeat nose bleed -  status post  [employer]. Continue, Ocean Nasal Spray. I reordered Afrin Nasal Spray.              This note transcribed by Lou Campoverde on 01/18/18 at 11:04 a.m.           Mai Cash D.O., PM&R     Attending    [facility]

## 2018-01-18 NOTE — PROGRESS NOTES
Occupational Therapy  Facility/Department: [facility]  Daily Treatment Note  NAME: Lloyd Munguia  : 1935  MRN: [id number]    Date of Service: 2018    Patient Diagnosis(es): There were no encounter diagnoses. has a past medical history of Abnormality of gait and mobility; Chronic kidney disease; Closed fracture of distal end of left radius; CVA (cerebral vascular accident) ([facility] [address] Essential hypertension; Fracture of hip, closed, right, initial encounter ([facility] [address] H/O total hip arthroplasty, right; History of TIA (transient ischemic attack); Hypercholesteremia; Hyperlipidemia; Obesity (BMI 30.0-34.9); Panic attacks; Postoperative anemia; [facility] Urinary incontinence; Venous stasis dermatitis; and Vitamin D deficiency. has a past surgical history that includes Appendectomy (); Upper gastrointestinal endoscopy (03); Colonoscopy; eye surgery; fracture surgery; and partial hip replacement (Right, 2017).     Restrictions  Restrictions/Precautions  Restrictions/Precautions: Fall Risk  Lower Extremity Weight Bearing Restrictions  Right Lower Extremity Weight Bearing: Weight Bearing As Tolerated  Upper Extremity Weight Bearing Restrictions  Right Upper Extremity Weight Bearing: Weight Bearing As Tolerated  Left Upper Extremity Weight Bearing: Weight Bearing As Tolerated  Other: RUE Splint for comfort during ambulation per Dr. Michael Todd; x-ray negative for fx  Position Activity Restriction  Hip Precautions: Posterior hip precautions  Other position/activity restrictions: Pt able to recall 3/3 hip precautions with verbal cues  Subjective   General  Chart Reviewed: Yes  Patient assessed for rehabilitation services?: Yes  Referring Practitioner: Dr. Michael Todd  Diagnosis: Right femoral neck fx s/p fall with R ANN on 17 with impaired mobility  Pre Treatment Pain Screening  Pain at present: 0  Scale Used: Numeric Score  Intervention List: Patient able to

## 2018-01-18 NOTE — PROGRESS NOTES
Physical Therapy Rehab Treatment Note  [facility]  [facility]       NAME: Juan Russo  : 1935 (43 y.o.)  MRN: [id number]  CODE STATUS: Full Code    Date of Service: 2018     Restrictions:  Restrictions/Precautions: Fall Risk    SUBJECTIVE: Subjective: I got a shower and had my staples taken out. Pain Screening  Patient Currently in Pain: Yes     Post Treatment Pain Screenin/10    OBJECTIVE: Focused treatment on increasing standing activity to increase endurance and strength. Transfers  Sit to Stand: Supervision  Stand to sit: Supervision  Bed to Chair: Supervision (with [employer])    Ambulation  Ambulation 1  Surface: level tile;carpet  Device: Rolling Walker  Assistance: Supervision  Quality of Gait: Antalgic gait pattern  Distance: [id number]    Exercises  Comments: Sink ex x10 reps B LEs. 1 seated rest break taken between. Fwd, retro, sidestepping in parallel bars x3 reps ea. Other exercises   Seated hip add with towel roll x20, LAQ x20, hip abd with manual resistance x20     ASSESSMENT/COMMENTS:Pt tolerated increased standing activity well.        PLAN OF CARE/Safety:   Safety Devices  Type of devices: Chair alarm in place;Gait belt    Therapy Time:   Individual   Time In 1500   Time Out 1600   Minutes 60     Minutes:      Transfer/Bed mobility training:      Gait training:15      Neuro re education:     Therapeutic ex:45    Miguel A KongidentSONY, 18 at 3:58 PM

## 2018-01-19 LAB
BASOPHILS ABSOLUTE: 0.1 K/UL (ref 0–0.2)
BASOPHILS RELATIVE PERCENT: 1 %
EOSINOPHILS ABSOLUTE: 0.3 K/UL (ref 0–0.7)
EOSINOPHILS RELATIVE PERCENT: 4.6 %
HCT VFR BLD CALC: 25.7 % (ref 37–47)
HEMOGLOBIN: 8.6 G/DL (ref 12–16)
LYMPHOCYTES ABSOLUTE: 1.3 K/UL (ref 1–4.8)
LYMPHOCYTES RELATIVE PERCENT: 22.7 %
MCH RBC QN AUTO: 29.3 PG (ref 27–31.3)
MCHC RBC AUTO-ENTMCNC: 33.3 % (ref 33–37)
MCV RBC AUTO: 88 FL (ref 82–100)
MONOCYTES ABSOLUTE: 0.6 K/UL (ref 0.2–0.8)
MONOCYTES RELATIVE PERCENT: 10.3 %
NEUTROPHILS ABSOLUTE: 3.6 K/UL (ref 1.4–6.5)
NEUTROPHILS RELATIVE PERCENT: 61.4 %
PDW BLD-RTO: 14.7 % (ref 11.5–14.5)
PLATELET # BLD: 415 K/UL (ref 130–400)
RBC # BLD: 2.93 M/UL (ref 4.2–5.4)
WBC # BLD: 5.9 K/UL (ref 4.8–10.8)

## 2018-01-19 PROCEDURE — 6370000000 HC RX 637 (ALT 250 FOR IP): Performed by: INTERNAL MEDICINE

## 2018-01-19 PROCEDURE — 85025 COMPLETE CBC W/AUTO DIFF WBC: CPT

## 2018-01-19 PROCEDURE — 97535 SELF CARE MNGMENT TRAINING: CPT

## 2018-01-19 PROCEDURE — 97116 GAIT TRAINING THERAPY: CPT

## 2018-01-19 PROCEDURE — 97110 THERAPEUTIC EXERCISES: CPT | Performed by: INTERNAL MEDICINE

## 2018-01-19 PROCEDURE — 97530 THERAPEUTIC ACTIVITIES: CPT

## 2018-01-19 PROCEDURE — 36415 COLL VENOUS BLD VENIPUNCTURE: CPT

## 2018-01-19 PROCEDURE — 99232 SBSQ HOSP IP/OBS MODERATE 35: CPT | Performed by: PHYSICAL MEDICINE & REHABILITATION

## 2018-01-19 PROCEDURE — 1180000000 HC REHAB R&B

## 2018-01-19 PROCEDURE — 6370000000 HC RX 637 (ALT 250 FOR IP): Performed by: PHYSICAL MEDICINE & REHABILITATION

## 2018-01-19 PROCEDURE — 6370000000 HC RX 637 (ALT 250 FOR IP): Performed by: NURSE PRACTITIONER

## 2018-01-19 RX ADMIN — ATORVASTATIN CALCIUM 10 MG: 10 TABLET, FILM COATED ORAL at 19:34

## 2018-01-19 RX ADMIN — DOCUSATE SODIUM 100 MG: 100 CAPSULE, LIQUID FILLED ORAL at 10:04

## 2018-01-19 RX ADMIN — MICONAZOLE NITRATE: 20 POWDER TOPICAL at 10:06

## 2018-01-19 RX ADMIN — RIVAROXABAN 20 MG: 20 TABLET, FILM COATED ORAL at 17:30

## 2018-01-19 RX ADMIN — DOCUSATE SODIUM 100 MG: 100 CAPSULE, LIQUID FILLED ORAL at 19:34

## 2018-01-19 ASSESSMENT — PAIN SCALES - GENERAL
PAINLEVEL_OUTOF10: 0

## 2018-01-19 NOTE — PROGRESS NOTES
Physical Therapy    Facility/Department: [facility] Daily Treatment Note    NAME: Ivana Fleming    : 1935 (8 y.o.)  MRN: [id number]    Account: [de-identified]  Gender: female    Date of Service: 18      SUBJECTIVE:     Start of tx pain 0/10  Location:   Description:   Response:     End of tx pain 0/10  Location:   Description:   Response:     Falls Safety Armband Present: [x] Yes  [] No    The below exercises were completed to facilitate strength, motor control   and muscular endurance. Seated:   AP: x20   LAQ: x20   Hip Flex: x20 L   Abduction: x20   Adduction: x20   Glut set: x20    The below exercises were completed to facilitate strength, motor control   and muscular endurance.     Supine:   QS: x20  SAQ: x20  Heel slides: x20    Safety/Judgment:     Safety Devices  Type of devices: Chair alarm in place;Gait belt    Minutes:      Transfer/Bed mobility training:      Gait training:      Neuro re education:      Therapeutic ex: 30    Therapy Time:    Individual   Time In 1000   Time Out 1030   Minutes 30

## 2018-01-19 NOTE — PROGRESS NOTES
nursing assistance to get up, bed and chair alarm. 8. Significant Left lobe or extremity superficial thromboembolism and DVT risk: progressive activities in PT, continue prophylaxis OTONIEL falcon, elevation. [address] for extensive superficial painful thrombophlebitis . Consult heme/onc to rule out hypercoagulable state and to transition off Xaralto to lower doses and to monitor for true DVT. Hold Xarelto and ASA due to nose bleed 01/15/18. I discussed Xarelto dosing with Dr. Meg De Luna. 9. Complex discharge planning:  Discharge date is set for 01/23/18 to home alone with occasional supervision and home health PT, OT, RN, aide and . I hope to have her using a regular wheeled walker at discharge. Patient and family education is in progress. The patient is to follow-up with their family physician after discharge. Complex Active General Medical Issues that complicate care Assess & Plan:    1. Fracture, hip, right, open type I or II, -status post right total hip arthroplasty - weightbearing as tolerated, up with assist, follow-up with orthopedic surgeon. Incision healing well, staples are out. 2.  History of Panic attacks - add recreational therapy, spiritual care, and music therapy, emotional support, adjust/add medications prn. Add adjustment to disability support group. 3.  Type 2 diabetes mellitus, hyperglycemia, Blood sugar 133 without Glucophage - Continue blood sugar checks BID, 1500-calorie ADA diet, adjust/add medications prn.        4. No results for input(s): POCGLU in the last 72 hours. Venous stasis dermatitis, Seborrheic keratosis,  Punctate keratitis - add ET mix bilateral lower extremities and protect her heels by placing them off the bed  4.    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           Sla Guillory D.O., PM&R     Attending    [address]

## 2018-01-19 NOTE — PROGRESS NOTES
and go home\"       Therapy Time   Individual Concurrent Group Co-treatment   Time In 1330         Time Out 1400         Minutes HAO Hernandez/L Electronically signed by HAO Pitts/L on 1/19/2018 at 4:18 PM

## 2018-01-19 NOTE — PROGRESS NOTES
1430   Time Out 1500   Minutes 30     Minutes:30      Transfer/Bed mobility training:10      Gait training:10      Neuro re education:10     Therapeutic ex:      Isabel Finnegan PTA, 01/19/18 at 5:21 PM

## 2018-01-19 NOTE — PROGRESS NOTES
Occupational Therapy  Facility/Department: [facility]  Daily Treatment Note  NAME: Jana Bah  : 1935  MRN: [id number]    Date of Service: 2018    Patient Diagnosis(es): There were no encounter diagnoses. has a past medical history of Abnormality of gait and mobility; Chronic kidney disease; Closed fracture of distal end of left radius; CVA (cerebral vascular accident) ([facility] [address] Essential hypertension; Fracture of hip, closed, right, initial encounter ([facility] H/O total hip arthroplasty, right; History of TIA (transient ischemic attack); Hypercholesteremia; Hyperlipidemia; Obesity (BMI 30.0-34.9); Panic attacks; Postoperative anemia; Type 2 diabetes mellitus without complication ([facility] Urinary incontinence; Venous stasis dermatitis; and Vitamin D deficiency. has a past surgical history that includes Appendectomy (); Upper gastrointestinal endoscopy (03); Colonoscopy; eye surgery; fracture surgery; and partial hip replacement (Right, 2017).     Restrictions  Restrictions/Precautions  Restrictions/Precautions: Fall Risk  Lower Extremity Weight Bearing Restrictions  Right Lower Extremity Weight Bearing: Weight Bearing As Tolerated  Upper Extremity Weight Bearing Restrictions  Right Upper Extremity Weight Bearing: Weight Bearing As Tolerated  Left Upper Extremity Weight Bearing: Weight Bearing As Tolerated  Other: RUE Splint for comfort during ambulation per Dr. Johnny Hammer; x-ray negative for fx  Position Activity Restriction  Hip Precautions: Posterior hip precautions  Other position/activity restrictions: Pt able to recall 3/3 hip precautions with verbal cues  Subjective   General  Chart Reviewed: Yes  Patient assessed for rehabilitation services?: Yes  Referring Practitioner: Dr. Johnny Hammer  Diagnosis: Right femoral neck fx s/p fall with R ANN on 17 with impaired mobility  Pre Treatment Pain Screening  Pain at present: 0  Scale Used: Numeric Score  Intervention List: Patient able to

## 2018-01-19 NOTE — PROGRESS NOTES
Physical Therapy Rehab Treatment Note  Facility/Department: [facility]  Room: [place] [address]       NAME: Madeline Knox  : 1935 (80 y.o.)  MRN: [id number]  CODE STATUS: Full Code    Date of Service: 2018  Chart Reviewed: Yes  Family / Caregiver Present: No  General Comment  Comments: agreeable to all aspects of tx    Restrictions:  Restrictions/Precautions: Fall Risk  Lower Extremity Weight Bearing Restrictions  Right Lower Extremity Weight Bearing: Weight Bearing As Tolerated  Upper Extremity Weight Bearing Restrictions  Right Upper Extremity Weight Bearing: Weight Bearing As Tolerated  Left Upper Extremity Weight Bearing: Weight Bearing As Tolerated       SUBJECTIVE: Subjective: do you think i am ready for home? Pain Screening  Patient Currently in Pain: No  Pre Treatment Pain Screening  Pain at present: 0  Scale Used: Numeric Score  Intervention List: Patient able to continue with treatment    Post Treatment Pain Screenin  Pain Assessment  Pain Assessment: 0-10  Pain Level: 0    OBJECTIVE:   Overall Orientation Status: Within Functional Limits    Transfers  Sit to Stand: Supervision  Stand to sit: Supervision    Ambulation  Ambulation?: Yes  Ambulation 1  Surface: level tile;carpet  Device: Rolling Walker  Assistance: Supervision  Quality of Gait: slow steady pace, wbos  Distance: 150 feet  Stairs/Curb  Stairs?: No        Activity Tolerance  Activity Tolerance: Patient Tolerated treatment well  ASSESSMENT/COMMENTS:tolerated session well without complaint. PLAN OF CARE/Safety:   Safety Devices  Type of devices:  All fall risk precautions in place      Therapy Time:   Individual   Time In 1030   Time Out 1100   Minutes 30     Minutes:30      Transfer/Bed mobility training:10      Gait training:10      Neuro re education:     Therapeutic ex:10      Sergio Alexander PTA, 18 at 11:01 AM

## 2018-01-20 PROCEDURE — 97116 GAIT TRAINING THERAPY: CPT

## 2018-01-20 PROCEDURE — 97110 THERAPEUTIC EXERCISES: CPT

## 2018-01-20 PROCEDURE — 99231 SBSQ HOSP IP/OBS SF/LOW 25: CPT | Performed by: PHYSICAL MEDICINE & REHABILITATION

## 2018-01-20 PROCEDURE — 6370000000 HC RX 637 (ALT 250 FOR IP): Performed by: PHYSICAL MEDICINE & REHABILITATION

## 2018-01-20 PROCEDURE — 1180000000 HC REHAB R&B

## 2018-01-20 PROCEDURE — 6370000000 HC RX 637 (ALT 250 FOR IP): Performed by: NURSE PRACTITIONER

## 2018-01-20 PROCEDURE — 6370000000 HC RX 637 (ALT 250 FOR IP): Performed by: INTERNAL MEDICINE

## 2018-01-20 RX ADMIN — ASPIRIN 81 MG: 81 TABLET, COATED ORAL at 08:38

## 2018-01-20 RX ADMIN — CETIRIZINE HYDROCHLORIDE 5 MG: 5 SOLUTION ORAL at 08:41

## 2018-01-20 RX ADMIN — DOCUSATE SODIUM 100 MG: 100 CAPSULE, LIQUID FILLED ORAL at 08:38

## 2018-01-20 RX ADMIN — RIVAROXABAN 20 MG: 20 TABLET, FILM COATED ORAL at 16:54

## 2018-01-20 RX ADMIN — ATORVASTATIN CALCIUM 10 MG: 10 TABLET, FILM COATED ORAL at 21:45

## 2018-01-20 RX ADMIN — MAGNESIUM HYDROXIDE 30 ML: 400 SUSPENSION ORAL at 21:45

## 2018-01-20 RX ADMIN — MICONAZOLE NITRATE: 20 POWDER TOPICAL at 21:48

## 2018-01-20 RX ADMIN — MICONAZOLE NITRATE: 20 POWDER TOPICAL at 08:43

## 2018-01-20 RX ADMIN — DOCUSATE SODIUM 100 MG: 100 CAPSULE, LIQUID FILLED ORAL at 21:45

## 2018-01-20 ASSESSMENT — PAIN SCALES - GENERAL: PAINLEVEL_OUTOF10: 0

## 2018-01-20 NOTE — PROGRESS NOTES
feet  Distance Traveled in Wheel Chair: 0  Stairs: 2- Maximal Assistance Performs 25-49% of the effort to go up and down 4 to 6 stairs and requires the assistance of one person only,  , Assessment: Improving and progressing towards goal achievement. Occupational therapy: FIMS:  Eatin - Feeds self with adaptive equipment/dentures and/or feeds self with modified diet and/or performs own tube feeding  Groomin - Patient independent with all grooming tasks  Bathin - Able to bathe all 10 areas with setup/sup/cues  Dressing-Upper: 5 - Requires setup/supervision/cues and/or requires assist with presthesis/brace only  Dressing-Lower: 4 - Requires assist with buttons/zippers/shoelaces and/or assist with shoes only  Toiletin - Requires device (grab bar/walker/etc.)  Toilet Transfer: 6 - Independent with device (grab bar/walker/slide bar)  Tub Transfer: 0 - Activity does not occur  Shower Transfer: 0 - Activity does not occur,  , Assessment: Pt. demonstrated decreased balance, strength, endurance and recall of precautions, which impacts her ability to safely perform ADL tasks. Pt. would benefit from continued skilled OT to assess these deficits and promote increased independence with ADL tasks. Speech therapy: FIMS: Comprehension: 6 - Complex ideas 90% or device (hearing aid/glasses)  Expression: 5 - Expresses basic ideas/needs only (hungry/hot/pain)  Social Interaction: 6 - Patient requires medication for mood and/or effect  Problem Solvin - Patient able to solve simple/routine tasks  Memory: 6 - Patient requires device to recall (e.g. memory book)      Lab/X-ray studies reviewed, analyzed and discussed with patient and staff:   No results found for this or any previous visit (from the past 24 hour(s)). Xr Femur Right (min 2 Views)  Result Date: 2017  CONCLUSION: RIGHT FEMORAL NECK FRACTURE WITH VARUS DEFORMITY. SMALL BIBASILAR DEPENDENT PULMONARY ATELECTASIS.     Xr Knee Right (3 Views)  Result Date: 12/29/2017  CONCLUSION: RIGHT FEMORAL NECK FRACTURE WITH VARUS DEFORMITY. SMALL BIBASILAR DEPENDENT PULMONARY ATELECTASIS. Previous extensive, complex labs, notes and diagnostics reviewed and analyzed. ALLERGIES:    Allergies as of 01/02/2018 - Review Complete 01/02/2018   Allergen Reaction Noted    Cephalexin  12/19/2011      (please also verify by checking [facility])       Complex Physical Medicine & Rehab Issues Assess & Plan:   1. Severe abnormality of gait and mobility and impaired self-care and ADL's secondary to acute right hip fracture status post right ANN with severe right wrist strain/sprain. Functional and medical status reassessed regarding patients ability to participate in therapies and patient found to be able to participate in acute intensive comprehensive inpatient rehabilitation program including PT/OT to improve balance, ambulation, ADLs, and to improve the P/AROM. Therapeutic modifications regarding activities in therapies, place, amount of time per day and intensity of therapy made daily. In bed therapies or bedside therapies prn.   2. Bowel severe constipation and Bladder dysfunction:  frequent toileting, ambulate to bathroom with assistance, check post void residuals. Check for C.difficile x1 if >2 loose stools in 24 hours, continue bowel & bladder program.  Monitor bowel and bladder function. Lactinex 2 PO every AC. MOM prn, Brown Bomb prn, Glycerin suppository prn, enema prn. Status post digital disimpaction. I prescribed Miralax daily. I prescribed Colace. 3. Severe right hip and right wrist pain generalized OA pain, clot related left lower extremity pain: reassess pain every shift and prior to and after each therapy session, give prn Tylenol and scheduled Percocet, modalities prn in therapy, Lidoderm, K-pad prn. I reviewed OARRS report, no significant opiates, no significant sedatives.    Pain is better with scheduled Percocet, I will monitor for sedation. 4. Yeast rash under left breast, Skin healing Right hip [place] numbness drainage and breakdown risk:  I prescribed Nystatin Powder, continue pressure relief program.  Daily skin exams and reports from nursing. Dry sterile dressing to the right hip every shift, add CoQ10.  5. Severe Fatigue due to nutritional and hydration deficiency:  continue to monitor I&Os, calorie counts prn, dietary consult prn. Add vitamin B12 CoQ10 and vitamin D.  6. Acute episodic insomnia with situational adjustment disorder:  prn Ambien, monitor for day time sedation. 7. Falls risk elevated:  patient to use call light to get nursing assistance to get up, bed and chair alarm. 8. Significant Left lobe or extremity superficial thromboembolism and DVT risk: progressive activities in PT, continue prophylaxis milla Mcdermott. Dardanelle Learn for extensive superficial painful thrombophlebitis . Consult heme/onc to rule out hypercoagulable state and to transition off Xaralto to lower doses and to monitor for true DVT. Hold Xarelto and ASA due to nose bleed 01/15/18. I discussed Xarelto dosing with Dr. Mandy Yang. 9. Complex discharge planning:  Discharge date is set for 01/23/18 to home alone with occasional supervision and home health PT, OT, RN, aide and . I hope to have her using a regular wheeled walker at discharge. Patient and family education is in progress. The patient is to follow-up with their family physician after discharge. Complex Active General Medical Issues that complicate care Assess & Plan:    1. Fracture, hip, right, open type I or II, -status post right total hip arthroplasty - weightbearing as tolerated, up with assist, follow-up with orthopedic surgeon. Incision healing well, staples are out. 2.  History of Panic attacks - add recreational therapy, spiritual care, and music therapy, emotional support, adjust/add medications prn. Add adjustment to disability support group.   3.  Type 2

## 2018-01-20 NOTE — PROGRESS NOTES
INPATIENT PROGRESS NOTE    SERVICE DATE:  1/20/2018   SERVICE TIME:  12:02 PM      SUBJECTIVE    INTERVAL HPI: 80year old female who is voicing no new complaints.   No overnight events, no new complaints tolerating rehab  ROS: 12 point ROS is negative  MEDICATIONS:    Current Facility-Administered Medications   Medication Dose Route Frequency Provider Last Rate Last Dose    docusate sodium (COLACE) capsule 100 mg  100 mg Oral BID Kaitlynn Scullin, DO   100 mg at 01/20/18 0838    miconazole (MICOTIN) 2 % powder   Topical BID Kaitlynn Scullin, DO        rivaroxaban (XARELTO) tablet 20 mg  20 mg Oral Daily Jong D Sedar, DO   20 mg at 01/19/18 1730    sodium chloride (OCEAN, BABY AYR) 0.65 % nasal spray 1 spray  1 spray Each Nare PRN Kaitlynn Scullin, DO        calcium carbonate (TUMS) chewable tablet 1,000 mg  1,000 mg Oral TID PRN Julian Sasha Sedar, DO        cetirizine HCl (ZYRTEC) 5 MG/5ML syrup 5 mg  5 mg Oral Daily Jong D Sedar, DO   5 mg at 01/20/18 0841    fluticasone (FLONASE) 50 MCG/ACT nasal spray 2 spray  2 spray Nasal Daily Julian Sasha Sedar, DO   2 spray at 01/15/18 2319    aspirin EC tablet 81 mg  81 mg Oral Daily Jong D Sedar, DO   81 mg at 01/20/18 0838    lidocaine (LIDODERM) 5 % 3 patch  3 patch Transdermal Daily Kaitlynn Scullin, DO   1 patch at 01/20/18 0842    acetaminophen (TYLENOL) tablet 650 mg  650 mg Oral Q4H PRN Poncho Miser, CNP   650 mg at 01/11/18 2101    dextrose 5 % solution  100 mL/hr Intravenous PRN Poncho Miser, CNP        oxyCODONE-acetaminophen (PERCOCET) 5-325 MG per tablet 1 tablet  1 tablet Oral Q4H PRN Poncho Miser, CNP   1 tablet at 01/04/18 0909    Or    oxyCODONE-acetaminophen (PERCOCET) 5-325 MG per tablet 2 tablet  2 tablet Oral Q4H PRN Poncho Miser, CNP        atorvastatin (LIPITOR) tablet 10 mg  10 mg Oral Daily Poncho Miser, CNP   10 mg at 01/19/18 1934       OBJECTIVE  PHYSICAL EXAM:   BP (!) 157/76   Pulse 86   Temp 98 °F (36.7 °C) (Oral)   Resp 17   Ht 5' 7\" (1.702 m)   Wt 211 lb (95.7 kg)   SpO2 97%   BMI 33.05 kg/m²   Body mass index is 33.05 kg/m². CONSTITUTIONAL:  awake, alert, cooperative, no apparent distress, and appears stated age  NECK:  Supple, symmetrical, trachea midline, no adenopathy, thyroid symmetric, not enlarged and no tenderness, skin normal  BACK:  Symmetric, no curvature, spinous processes are non-tender on palpation, paraspinous muscles are non-tender on palpation, no costal vertebral tenderness  LUNGS:  No increased work of breathing, good air exchange, clear to auscultation bilaterally, no crackles or wheezing  CARDIOVASCULAR:  Normal apical impulse, regular rate and rhythm, normal S1 and S2, no S3 or S4, and no murmur noted  ABDOMEN:  No scars, normal bowel sounds, soft, non-distended, non-tender, no masses palpated, no hepatosplenomegally  MUSCULOSKELETAL:  + LE edema trace  NEUROLOGIC:  Awake, alert, oriented to name, place and time. Cranial nerves II-XII are grossly intact. Motor is 5 out of 5 bilaterally. Cerebellar finger to nose, heel to shin intact. Sensory is intact. Babinski down going, Romberg negative, and gait is normal.  SKIN:  no bruising or bleeding, no lesions, or jaundice      DATA:     No results found for this or any previous visit (from the past 24 hour(s)). Past Medical History:   Diagnosis Date    Abnormality of gait and mobility     Chronic kidney disease     Closed fracture of distal end of left radius 7/19/2017 July 2017-[facility]    CVA [facility]cerebral vascular accident) ([facility] [address] 01/2016    Essential hypertension 1/8/2016    Fracture of hip, closed, right, initial encounter ([facility] [address] 12/29/2017    H/O total hip arthroplasty, right 12/30/2017    History of TIA (transient ischemic attack) 12/31/2017    Hypercholesteremia 1/8/2016    Hyperlipidemia     Obesity (BMI 30.0-34. 9)     BMI 33.2    Panic attacks     Postoperative anemia 12/31/2017    Type 2 diabetes mellitus without complication ([facility] [address]    

## 2018-01-20 NOTE — PROGRESS NOTES
Physical Therapy Rehab Treatment Note  Fac[facility]/Department: [facility]  [facility]       NAME: Bryant Medina  : 1935 (79 y.o.)  MRN: [id number]  CODE STATUS: Full Code    Date of Service: 2018     Restrictions:  Restrictions/Precautions: Fall Risk     SUBJECTIVE:    Pre and Post Treatment Pain Screenin/10     OBJECTIVE:      Transfers  Sit to Stand: Supervision  Stand to sit: Supervision  Bed to Chair: Supervision (with [address] [place] [address])    Ambulation  Ambulation?: Yes  Ambulation 1  Surface: level tile;carpet  Device: Rolling Walker  Assistance: Modified Independent;Supervision  Quality of Gait: mild antalgic pattern  Distance: 150ft  Stairs/Curb  Stairs?: Yes   Stairs  # Steps : 4  Stairs Height: 6\"  Rails: Bilateral     Other exercises  Other exercises 1: standing B march x10 and B heel raises x20 ea to build LE strength and stability   ASSESSMENT/COMMENTS: Pt tolerated treatment well. Occasional cues to improve technique and ease with transfers.        PLAN OF CARE/Safety:   Plan Comment: Cont to progress pt per POC     Therapy Time:   Individual   Time In 1100   Time Out 1130   Minutes 30     Minutes:      Transfer/Bed mobility training:      Gait trainin      Neuro re education:     Therapeutic ex:10      Lois Hurtado PTA, 18 at 11:29 AM

## 2018-01-21 PROCEDURE — 6370000000 HC RX 637 (ALT 250 FOR IP): Performed by: PHYSICAL MEDICINE & REHABILITATION

## 2018-01-21 PROCEDURE — 97535 SELF CARE MNGMENT TRAINING: CPT

## 2018-01-21 PROCEDURE — 97116 GAIT TRAINING THERAPY: CPT

## 2018-01-21 PROCEDURE — 6370000000 HC RX 637 (ALT 250 FOR IP): Performed by: INTERNAL MEDICINE

## 2018-01-21 PROCEDURE — 6370000000 HC RX 637 (ALT 250 FOR IP): Performed by: NURSE PRACTITIONER

## 2018-01-21 PROCEDURE — 99231 SBSQ HOSP IP/OBS SF/LOW 25: CPT | Performed by: PHYSICAL MEDICINE & REHABILITATION

## 2018-01-21 PROCEDURE — 1180000000 HC REHAB R&B

## 2018-01-21 RX ADMIN — DOCUSATE SODIUM 100 MG: 100 CAPSULE, LIQUID FILLED ORAL at 21:49

## 2018-01-21 RX ADMIN — MICONAZOLE NITRATE: 20 POWDER TOPICAL at 21:50

## 2018-01-21 RX ADMIN — ASPIRIN 81 MG: 81 TABLET, COATED ORAL at 10:34

## 2018-01-21 RX ADMIN — DOCUSATE SODIUM 100 MG: 100 CAPSULE, LIQUID FILLED ORAL at 10:34

## 2018-01-21 RX ADMIN — MICONAZOLE NITRATE: 20 POWDER TOPICAL at 10:35

## 2018-01-21 RX ADMIN — RIVAROXABAN 20 MG: 20 TABLET, FILM COATED ORAL at 16:56

## 2018-01-21 RX ADMIN — MAGNESIUM HYDROXIDE 30 ML: 400 SUSPENSION ORAL at 21:58

## 2018-01-21 RX ADMIN — ATORVASTATIN CALCIUM 10 MG: 10 TABLET, FILM COATED ORAL at 21:49

## 2018-01-21 ASSESSMENT — PAIN SCALES - GENERAL
PAINLEVEL_OUTOF10: 0

## 2018-01-21 NOTE — PROGRESS NOTES
to go up and down 4 to 6 stairs and requires the assistance of one person only,  , Assessment: Improving and progressing towards goal achievement. Occupational therapy: FIMS:  Eatin - Feeds self with adaptive equipment/dentures and/or feeds self with modified diet and/or performs own tube feeding  Groomin - Patient independent with all grooming tasks  Bathin - Able to bathe all 10 areas with setup/sup/cues  Dressing-Upper: 5 - Requires setup/supervision/cues and/or requires assist with presthesis/brace only  Dressing-Lower: 4 - Requires assist with buttons/zippers/shoelaces and/or assist with shoes only  Toiletin - Requires device (grab bar/walker/etc.)  Toilet Transfer: 6 - Independent with device (grab bar/walker/slide bar)  Tub Transfer: 0 - Activity does not occur  Shower Transfer: 0 - Activity does not occur,  , Assessment: Pt. demonstrated decreased balance, strength, endurance and recall of precautions, which impacts her ability to safely perform ADL tasks. Pt. would benefit from continued skilled OT to assess these deficits and promote increased independence with ADL tasks. Speech therapy: FIMS: Comprehension: 6 - Complex ideas 90% or device (hearing aid/glasses)  Expression: 5 - Expresses basic ideas/needs only (hungry/hot/pain)  Social Interaction: 6 - Patient requires medication for mood and/or effect  Problem Solvin - Patient able to solve simple/routine tasks  Memory: 6 - Patient requires device to recall (e.g. memory book)      Lab/X-ray studies reviewed, analyzed and discussed with patient and staff:   No results found for this or any previous visit (from the past 24 hour(s)). Xr Femur Right (min 2 Views)  Result Date: 2017  CONCLUSION: RIGHT FEMORAL NECK FRACTURE WITH VARUS DEFORMITY. SMALL BIBASILAR DEPENDENT PULMONARY ATELECTASIS. Xr Knee Right (3 Views)  Result Date: 2017  CONCLUSION: RIGHT FEMORAL NECK FRACTURE WITH VARUS DEFORMITY.  SMALL BIBASILAR DEPENDENT PULMONARY ATELECTASIS. Previous extensive, complex labs, notes and diagnostics reviewed and analyzed. ALLERGIES:    Allergies as of 01/02/2018 - Review Complete 01/02/2018   Allergen Reaction Noted    Cephalexin  12/19/2011      (please also verify by checking [facility])       Complex Physical Medicine & Rehab Issues Assess & Plan:   1. Severe abnormality of gait and mobility and impaired self-care and ADL's secondary to acute right hip fracture status post right ANN with severe right wrist strain/sprain. Functional and medical status reassessed regarding patients ability to participate in therapies and patient found to be able to participate in acute intensive comprehensive inpatient rehabilitation program including PT/OT to improve balance, ambulation, ADLs, and to improve the P/AROM. Therapeutic modifications regarding activities in therapies, place, amount of time per day and intensity of therapy made daily. In bed therapies or bedside therapies prn.   2. Bowel severe constipation and Bladder dysfunction:  frequent toileting, ambulate to bathroom with assistance, check post void residuals. Check for C.difficile x1 if >2 loose stools in 24 hours, continue bowel & bladder program.  Monitor bowel and bladder function. Lactinex 2 PO every AC. MOM prn, Brown Bomb prn, Glycerin suppository prn, enema prn. Status post digital disimpaction. I prescribed Miralax daily. I prescribed Colace. 3. Severe right hip and right wrist pain generalized OA pain, clot related left lower extremity pain: reassess pain every shift and prior to and after each therapy session, give prn Tylenol and scheduled Percocet, modalities prn in therapy, Lidoderm, K-pad prn. I reviewed OARRS report, no significant opiates, no significant sedatives. Pain is better with scheduled Percocet, I will monitor for sedation.   4. Yeast rash under left breast, Skin healing Right hip [place] numbness drainage and breakdown risk: I prescribed Nystatin Powder, continue pressure relief program.  Daily skin exams and reports from nursing. Dry sterile dressing to the right hip every shift, add CoQ10.  5. Severe Fatigue due to nutritional and hydration deficiency:  continue to monitor I&Os, calorie counts prn, dietary consult prn. Add vitamin B12 CoQ10 and vitamin D.  6. Acute episodic insomnia with situational adjustment disorder:  prn Ambien, monitor for day time sedation. 7. Falls risk elevated:  patient to use call light to get nursing assistance to get up, bed and chair alarm. 8. Significant Left lobe or extremity superficial thromboembolism and DVT risk: progressive activities in PT, continue prophylaxis milla Mcdermott. Heriberto Learn for extensive superficial painful thrombophlebitis . Consult heme/onc to rule out hypercoagulable state and now on Xaralto to lower doses-20 mg a day and to monitor for true DVT. 9. Complex discharge planning:  Discharge date is set for 01/23/18 to home alone with occasional supervision and home health PT, OT, RN, aide and . I hope to have her using a regular wheeled walker at discharge. Patient and family education is in progress. The patient is to follow-up with their family physician after discharge. Complex Active General Medical Issues that complicate care Assess & Plan:    1. Fracture, hip, right, open type I or II, -status post right total hip arthroplasty - weightbearing as tolerated, up with assist, follow-up with orthopedic surgeon. Incision healing well, staples are out. 2.  History of Panic attacks - add recreational therapy, spiritual care, and music therapy, emotional support, adjust/add medications prn. Add adjustment to disability support group.   3.  Type 2 diabetes mellitus-diet controlled, hyperglycemia, Blood sugar 133 without Glucophage - Continue blood sugar checks BID, 1500-calorie ADA diet, adjust/add medications prn.        4.   Venous stasis dermatitis,

## 2018-01-22 LAB
ANION GAP SERPL CALCULATED.3IONS-SCNC: 11 MEQ/L (ref 7–13)
BASOPHILS ABSOLUTE: 0.1 K/UL (ref 0–0.2)
BASOPHILS RELATIVE PERCENT: 1.2 %
BUN BLDV-MCNC: 14 MG/DL (ref 8–23)
CALCIUM SERPL-MCNC: 10 MG/DL (ref 8.6–10.2)
CHLORIDE BLD-SCNC: 109 MEQ/L (ref 98–107)
CO2: 24 MEQ/L (ref 22–29)
CREAT SERPL-MCNC: 0.71 MG/DL (ref 0.5–0.9)
EOSINOPHILS ABSOLUTE: 0.2 K/UL (ref 0–0.7)
EOSINOPHILS RELATIVE PERCENT: 4.8 %
GFR AFRICAN AMERICAN: >60
GFR NON-AFRICAN AMERICAN: >60
GLUCOSE BLD-MCNC: 140 MG/DL (ref 74–109)
HCT VFR BLD CALC: 26.3 % (ref 37–47)
HEMOGLOBIN: 8.5 G/DL (ref 12–16)
LYMPHOCYTES ABSOLUTE: 1.2 K/UL (ref 1–4.8)
LYMPHOCYTES RELATIVE PERCENT: 22.4 %
MCH RBC QN AUTO: 28.9 PG (ref 27–31.3)
MCHC RBC AUTO-ENTMCNC: 32.4 % (ref 33–37)
MCV RBC AUTO: 89.4 FL (ref 82–100)
MONOCYTES ABSOLUTE: 0.5 K/UL (ref 0.2–0.8)
MONOCYTES RELATIVE PERCENT: 10.1 %
NEUTROPHILS ABSOLUTE: 3.2 K/UL (ref 1.4–6.5)
NEUTROPHILS RELATIVE PERCENT: 61.5 %
PDW BLD-RTO: 14.7 % (ref 11.5–14.5)
PLATELET # BLD: 417 K/UL (ref 130–400)
POTASSIUM SERPL-SCNC: 3.7 MEQ/L (ref 3.5–5.1)
RBC # BLD: 2.94 M/UL (ref 4.2–5.4)
SODIUM BLD-SCNC: 144 MEQ/L (ref 132–144)
WBC # BLD: 5.1 K/UL (ref 4.8–10.8)

## 2018-01-22 PROCEDURE — 1180000000 HC REHAB R&B

## 2018-01-22 PROCEDURE — 36415 COLL VENOUS BLD VENIPUNCTURE: CPT

## 2018-01-22 PROCEDURE — 97110 THERAPEUTIC EXERCISES: CPT

## 2018-01-22 PROCEDURE — 6370000000 HC RX 637 (ALT 250 FOR IP): Performed by: PHYSICAL MEDICINE & REHABILITATION

## 2018-01-22 PROCEDURE — 97535 SELF CARE MNGMENT TRAINING: CPT

## 2018-01-22 PROCEDURE — 85025 COMPLETE CBC W/AUTO DIFF WBC: CPT

## 2018-01-22 PROCEDURE — 6370000000 HC RX 637 (ALT 250 FOR IP): Performed by: INTERNAL MEDICINE

## 2018-01-22 PROCEDURE — 97116 GAIT TRAINING THERAPY: CPT

## 2018-01-22 PROCEDURE — 99232 SBSQ HOSP IP/OBS MODERATE 35: CPT | Performed by: PHYSICAL MEDICINE & REHABILITATION

## 2018-01-22 PROCEDURE — 97530 THERAPEUTIC ACTIVITIES: CPT

## 2018-01-22 PROCEDURE — 80048 BASIC METABOLIC PNL TOTAL CA: CPT

## 2018-01-22 PROCEDURE — 6370000000 HC RX 637 (ALT 250 FOR IP): Performed by: NURSE PRACTITIONER

## 2018-01-22 RX ORDER — LIDOCAINE 50 MG/G
3 PATCH TOPICAL DAILY
Qty: 30 PATCH | Refills: 0 | Status: SHIPPED | OUTPATIENT
Start: 2018-01-23 | End: 2018-05-01 | Stop reason: ALTCHOICE

## 2018-01-22 RX ORDER — POLYETHYLENE GLYCOL 3350 17 G/17G
17 POWDER, FOR SOLUTION ORAL DAILY PRN
Status: DISCONTINUED | OUTPATIENT
Start: 2018-01-22 | End: 2018-01-23 | Stop reason: HOSPADM

## 2018-01-22 RX ORDER — FLUTICASONE PROPIONATE 50 MCG
2 SPRAY, SUSPENSION (ML) NASAL DAILY
Qty: 1 BOTTLE | Refills: 3 | Status: SHIPPED | OUTPATIENT
Start: 2018-01-23

## 2018-01-22 RX ADMIN — RIVAROXABAN 20 MG: 20 TABLET, FILM COATED ORAL at 17:06

## 2018-01-22 RX ADMIN — DOCUSATE SODIUM 100 MG: 100 CAPSULE, LIQUID FILLED ORAL at 10:37

## 2018-01-22 RX ADMIN — ASPIRIN 81 MG: 81 TABLET, COATED ORAL at 10:37

## 2018-01-22 RX ADMIN — ATORVASTATIN CALCIUM 10 MG: 10 TABLET, FILM COATED ORAL at 19:40

## 2018-01-22 RX ADMIN — MICONAZOLE NITRATE: 20 POWDER TOPICAL at 10:39

## 2018-01-22 ASSESSMENT — ENCOUNTER SYMPTOMS
NAUSEA: 0
DIARRHEA: 0
CONSTIPATION: 0
WHEEZING: 0
SHORTNESS OF BREATH: 0
VOMITING: 0
COUGH: 0
SPUTUM PRODUCTION: 0

## 2018-01-22 ASSESSMENT — PAIN SCALES - GENERAL
PAINLEVEL_OUTOF10: 0

## 2018-01-22 NOTE — PROGRESS NOTES
Occupational Therapy    Occupational Therapy Discharge Report  Date: 2018    Patient Name: Denisse Hameed  MRN: [id number]     : 1935     Restrictions   Hip precautions    Subjective    · Referring practitioner: Dr. Willis Hermosillo        Objective    ADL  Feeding: Modified independent   Grooming: Independent  UE Bathing: Modified independent   LE Bathing: Modified independent  (Pt. sponge bathed and declined to wash feet; pt. independently stated that at home she would use her long sponge to bathe feet)  UE Dressing: Independent  LE Dressing: Supervision (Assist with OTONIEL hose and encouragement during AE use 2° pt. anxiety)  Toileting: Modified independent     Overall Orientation Status: Within Functional Limits       Transfers  Sit to stand: Modified independent  Stand to sit: Modified independent         Functional Mobility  Functional - Mobility Device: Rolling Walker  Activity: To/from bathroom;Transport items  Assist Level: Modified independent   Toilet Transfers  Toilet - Technique: Ambulating  Equipment Used: Grab bars  Toilet Transfer: Modified independent  Shower Transfers  Shower Transfers: Not tested  Shower Transfers Comments: Pt. refused, preferring to sponge bathe.   Pt. has been SBA for transfers in the past.     Perception  Overall Perceptual Status: WFL  Sensation  Overall Sensation Status: WFL    Posture: Fair      LUE AROM (degrees)  LUE AROM : WFL  Left Hand AROM (degrees)  Left Hand AROM: WFL  RUE AROM (degrees)  RUE AROM : WFL  Right Hand AROM (degrees)  Right Hand AROM: WFL    LUE Strength  Gross LUE Strength: WFL  L Shoulder Flex: 3/5  L Shoulder Ext: 3/5  L Shoulder ABduction: 3/5  L Shoulder ADduction: 3/5  L Shoulder Horiz ABduction: 3/5  L Shoulder Horiz ADduction: 3/5  L Elbow Flex: 3/5  L Elbow Ext: 3/5  L Wrist Flex: 3/5  L Wrist Ext: 3/5  L Hand Grasp: 3/5  L Hand Release: 3/5  RUE Strength  Gross RUE Strength: WFL  R Shoulder Flex: 3/5  R Shoulder Ext: 3/5  R Shoulder ABduction: 3/5  R Shoulder ADduction: 3/5  R Shoulder Horiz ABduction: 3/5  R Shoulder Horiz ADduction: 3/5  R Elbow Flex: 3/5  R Elbow Ext: 3/5  R Wrist Flex: 3/5  R Wrist Ext: 3/5  R Hand Grasp: 3/5  R Hand Release: 3/5         Assessment   OT D/C RECOMMENDATIONS  REQUIRES OT FOLLOW UP: Yes  Type: Home OT         Overall Cognitive Status: Exceptions  Arousal/Alertness: Appropriate responses to stimuli  Following Commands: Follows one step commands consistently; Follows multistep commands with increased time; Follows multistep commands with repitition  Attention Span: Appears intact  Memory: Decreased recall of precautions;Decreased short term memory  Safety Judgement: Decreased awareness of need for assistance  Problem Solving: Assistance required to generate solutions;Assistance required to implement solutions;Assistance required to correct errors made  Insights: Decreased awareness of deficits  Initiation: Requires cues for some  Sequencing: Requires cues for some  Cognition Comment: Comprehension: Mod I, Expression: Mod I, Social Interaction: Mod I, Problem solving: Supervision, Memory: Supervision     Plan   D/c home with home health.      G-Shekhar Florez Electronically signed by HAO Martinez/L on 1/22/2018 at 4:43 PM

## 2018-01-22 NOTE — PROGRESS NOTES
diabetes mellitus without complication ([facility] [address]     Urinary incontinence     Venous stasis dermatitis 10/31/2013    Vitamin D deficiency 10/24/2017     Past Surgical History:   Procedure Laterality Date    APPENDECTOMY  1952    COLONOSCOPY      EYE SURGERY      FRACTURE SURGERY      L wrist    VT PARTIAL HIP REPLACEMENT Right 12/30/2017    RIGHT HIP HEMIARTHROPLASTY performed by Sundar Baez MD at [address]  4/4/03       Indications for Skilled Intervention: Decreased functional mobility , Decreased ROM, Decreased strength, Decreased balance, Decreased safe awareness, Decreased coordination    LOF: upon DC, pt demonstrates the following abilities. Pt able to state and follow hip prec with functional ability. Handout issued for list of hip prec and instruction for icing hip.        Bed mobility  Supine to Sit: Modified independent  Sit to Supine: Modified independent  Comment: Mild increased time and effort to complete.       Transfers  Sit to Stand: Modified independent  Stand to sit: Modified independent  Bed to Chair: Modified independent (with [employer])  Car Transfer: Supervision (Pt with increased ease and good technique. Pt states she will be picked up in a SUV. )     Ambulation  Ambulation?: Yes  Ambulation 1  Surface: level tile;carpet  Device: Rolling Walker  Assistance: Modified Independent  Quality of Gait: Mild antalgic gait pattern  Distance: 200ft  Stairs/Curb  Stairs?: Yes   Stairs  # Steps : 12  Stairs Height: 6\"  Rails: Bilateral  Curbs: 4\"  Device: Rolling walker  Assistance: Modified independent        Summary of POC: Throughout rehab stay, pt received skilled physical therapy treatment 2.0 hours daily for 3 weeks.     Skilled Services Provided: Strengthening, ROM, Balance Training, Functional Mobility Training, Transfer Training, Gait Training, Stair training, Neuromuscular Re-education, Manual Therapy - Soft Tissue Mobilization, Home Exercise Program, Safety Education & Training, Patient/Caregiver Education & Training, Equipment Evaluation, Education, & procurement, Modalities (Please refer to daily notes for all treatment details)    ASSESSMENT: Pt demonstrating independence in basic mobility. Appropriate for DC from PT program at this time. Discharge Plan: DC home with f/u PT rec. [employer] for ambulation.     Electronically signed by Isai Osorio PT on 1/24/2018 at 9:21 AM

## 2018-01-22 NOTE — PROGRESS NOTES
Physical Therapy Rehab Treatment Note  Facility/Department: [facility]  Room: Z616/N579-56       NAME: Denisse Hameed  : 1935 (68 y.o.)  MRN: [id number]  CODE STATUS: Full Code    Date of Service: 2018  General Comment  Comments: Pt's son present and observed. Educated son on assisting pt with car transfer and stair negotion if needed. Restrictions:  Restrictions/Precautions: Fall Risk    SUBJECTIVE:    Pain Screening  Patient Currently in Pain: No     Post Treatment Pain Screenin/10     OBJECTIVE: Pt able to state and follow hip prec with functional ability. Handout issued for list of hip prec and instruction for icing hip. Bed mobility  Supine to Sit: Modified independent  Sit to Supine: Modified independent  Comment: Mild increased time and effort to complete. Transfers  Sit to Stand: Modified independent  Stand to sit: Modified independent  Bed to Chair: Modified independent (with [employer])  Car Transfer: Supervision (Pt with increased ease and good technique. Pt states she will be picked up in a SUV.  )    Ambulation  Ambulation?: Yes  Ambulation 1  Surface: level tile;carpet  Device: Rolling Walker  Assistance: Modified Independent  Quality of Gait: Mild antalgic gait pattern  Distance: 200ft  Stairs/Curb  Stairs?: Yes   Stairs  # Steps : 12  Stairs Height: 6\"  Rails: Bilateral  Curbs: 4\"  Device: Rolling walker  Assistance: Modified independent     Activity Tolerance  Activity Tolerance: Patient Tolerated treatment well    Exercises  Hip Abduction: seated x20, &adduction with ball  Knee Long Arc Quad: x20  Ankle Pumps: x 20     ASSESSMENT/COMMENTS:  Assessment: LTGs met.       PLAN OF CARE/Safety:   Plan Comment: Review and issue HEP for LE seated and sink ex this PM.      Therapy Time:   Individual   Time In 1430   Time Out 1500   Minutes 30     Minutes:      Transfer/Bed mobility training:10      Gait trainin      Neuro re education:     Therapeutic ex:     Elaine Geiger PTA, 01/22/18 at 2:56 PM

## 2018-01-22 NOTE — PROGRESS NOTES
language deficits. · Motor Speech: Patient demonstrates no motor speech deficits. · Swallow: Patient demonstrates no dysphagia.                      Clinical Bedside assessment was not completed                       Instrumental assessment was not completed                      Patient is discharged to Home on 1/23/18                Kassandra Anaya

## 2018-01-22 NOTE — PROGRESS NOTES
continue with treatment  Pain Assessment  Patient Currently in Pain: No  Pain Assessment: 0-10  Pain Level: 0  Vital Signs  Patient Currently in Pain: No   Orientation  Orientation  Overall Orientation Status: Within Functional Limits  Objective      Pt. seen for morning d/c ADL. Pt. refused shower this AM 2° fatigue. Tx. started late 2° pt. finishing breakfast. Sponge bath sink side as below. ADL  Feeding: Modified independent   Grooming: Independent  UE Bathing: Modified independent   LE Bathing: Modified independent  (Pt. sponge bathed and declined to wash feet; pt. independently stated that at home she would use her long sponge to bathe feet)  UE Dressing: Independent  LE Dressing: Supervision (Assist with OTONIEL hose and encouragement during AE use 2° pt. anxiety)  Toileting: Modified independent         Standing Balance  Sit to stand: Modified independent  Stand to sit: Modified independent  Functional Mobility  Functional - Mobility Device: Rolling Walker  Activity: To/from bathroom;Transport items  Assist Level: Modified independent   Toilet Transfers  Toilet - Technique: Ambulating  Equipment Used: Grab bars  Toilet Transfer: Modified independent  Shower Transfers  Shower Transfers: Not tested  Shower Transfers Comments: Pt. refused, preferring to sponge bathe. Transfers  Sit to stand: Modified independent  Stand to sit: Modified independent      Assessment      Discharge Recommendations: Continue to assess pending progress  Activity Tolerance  Activity Tolerance: Patient Tolerated treatment well  Safety Devices  Safety Devices in place: Yes  Type of devices:  All fall risk precautions in place       Discharge Recommendations:  Continue to assess pending progress     Plan   Plan  Times per week: 5-7x/week, 15-60 minutes/day  Times per day: Daily  Plan weeks: 1 1/2-2  Current Treatment Recommendations: Strengthening, Balance Training, Endurance Training, Self-Care / ADL, Safety Education & Training,

## 2018-01-22 NOTE — PROGRESS NOTES
Subjective: The patient complains of moderate right wrist and right hip pain partially relieved by Lidoderm, rest, splinting, ice, Percocet and exacerbated by  weightbearing and range of motion and palpation. She is feeling better now with the nasal packing out of her nose. I extended her discharge date to 01/23/18 to work on balance, family training and endurance. I am still concerned about her anemia. So far looks stable. However, I have reordered the Hemoccults of her stool. The clot in her leg is getting much better even on the lower doses of the Xaralto. She complains of constipation. ROS x10: The patient also complains of severely impaired mobility and activities of daily living. Otherwise no new problems with vision, hearing, nose, mouth, throat, dermal, cardiovascular, GI, , pulmonary, musculoskeletal, psychiatric or neurological. See Rehab H&P on Rehab chart dated 01/03/18. Vital signs:  BP (!) 152/68   Pulse 76   Temp 98 °F (36.7 °C) (Oral)   Resp 16   Ht 5' 7\" (1.702 m)   Wt 211 lb (95.7 kg)   SpO2 94%   BMI 33.05 kg/m²   I/O:   PO/Intake:  fair PO intake, no problems observed or reported. Bowel/Bladder:  continent, severe constipation. General:  Patient is well developed, adequately nourished, non-obese and     well kempt. HEENT:    PERRLA, hearing intact to loud voice, external inspection of ear and nose benign. Inspection of lips, tongue and gums benign. Musculoskeletal: No significant change in strength or tone. All joints stable. Inspection and palpation of digits and nails show no clubbing,       cyanosis or inflammatory conditions. Neuro/Psychiatric: Affect: Bright and pleasant. Alert and oriented to person, place and     situation. No significant change in deep tendon reflexes or     Sensation-she has subtle cognitive deficits  Lungs:  Diminished CTA-B. Respiration effort is normal at rest.     Heart:   S1 = S2, RRR.   No loud 0  Stairs: 2- Maximal Assistance Performs 25-49% of the effort to go up and down 4 to 6 stairs and requires the assistance of one person only,  , Assessment: Min difficulty with car transfer, increased assistance to get out d/t lower surface. Decreased abilities to push with R UE with transfers although fair safety awareness. Education on scenarios of maintaining hip precautions such as getting out of the car, taking socks off, etc. Visitors present for tx. Occupational therapy: FIMS:  Eatin - Feeds self with adaptive equipment/dentures and/or feeds self with modified diet and/or performs own tube feeding  Groomin - Requires setup/cues to do all tasks  Bathing: 3 - Able to bathe 5-7 areas  Dressing-Upper: 5 - Requires setup/supervision/cues and/or requires assist with presthesis/brace only  Dressing-Lower: 3 - Requires assist with 2-3 parts of dressing  Toiletin - Requires device (grab bar/walker/etc.)  Toilet Transfer: 6 - Independent with device (grab bar/walker/slide bar)  Tub Transfer: 0 - Activity does not occur  Shower Transfer: 0 - Activity does not occur,  , Assessment: Pt. demonstrated decreased balance, strength, endurance and recall of precautions, which impacts her ability to safely perform ADL tasks. Pt. would benefit from continued skilled OT to assess these deficits and promote increased independence with ADL tasks.     Speech therapy: FIMS: Comprehension: 6 - Complex ideas 90% or device (hearing aid/glasses)  Expression: 6 - Device used to express complex ideas/needs  Social Interaction: 6 - Patient requires medication for mood and/or effect  Problem Solvin - Patient able to solve simple/routine tasks  Memory: 6 - Patient requires device to recall (e.g. memory book)      Lab/X-ray studies reviewed, analyzed and discussed with patient and staff:   Recent Results (from the past 24 hour(s))   CBC Auto Differential    Collection Time: 18  5:47 AM   Result Value Ref Range    WBC 5.1 4.8 - 10.8 K/uL    RBC 2.94 (L) 4.20 - 5.40 M/uL    Hemoglobin 8.5 (L) 12.0 - 16.0 g/dL    Hematocrit 26.3 (L) 37.0 - 47.0 %    MCV 89.4 82.0 - 100.0 fL    MCH 28.9 27.0 - 31.3 pg    MCHC 32.4 (L) 33.0 - 37.0 %    RDW 14.7 (H) 11.5 - 14.5 %    Platelets 570 (H) 580 - 400 K/uL    Neutrophils % 61.5 %    Lymphocytes % 22.4 %    Monocytes % 10.1 %    Eosinophils % 4.8 %    Basophils % 1.2 %    Neutrophils # 3.2 1.4 - 6.5 K/uL    Lymphocytes # 1.2 1.0 - 4.8 K/uL    Monocytes # 0.5 0.2 - 0.8 K/uL    Eosinophils # 0.2 0.0 - 0.7 K/uL    Basophils # 0.1 0.0 - 0.2 K/uL   Basic Metabolic Panel    Collection Time: 01/22/18  5:47 AM   Result Value Ref Range    Sodium 144 132 - 144 mEq/L    Potassium 3.7 3.5 - 5.1 mEq/L    Chloride 109 (H) 98 - 107 mEq/L    CO2 24 22 - 29 mEq/L    Anion Gap 11 7 - 13 mEq/L    Glucose 140 (H) 74 - 109 mg/dL    BUN 14 8 - 23 mg/dL    CREATININE 0.71 0.50 - 0.90 mg/dL    GFR Non-African American >60.0 >60    GFR  >60.0 >60    Calcium 10.0 8.6 - 10.2 mg/dL           Xr Femur Right (min 2 Views)  Result Date: 12/29/2017  CONCLUSION: RIGHT FEMORAL NECK FRACTURE WITH VARUS DEFORMITY. SMALL BIBASILAR DEPENDENT PULMONARY ATELECTASIS. Xr Knee Right (3 Views)  Result Date: 12/29/2017  CONCLUSION: RIGHT FEMORAL NECK FRACTURE WITH VARUS DEFORMITY. SMALL BIBASILAR DEPENDENT PULMONARY ATELECTASIS. Previous extensive, complex labs, notes and diagnostics reviewed and analyzed. ALLERGIES:    Allergies as of 01/02/2018 - Review Complete 01/02/2018   Allergen Reaction Noted    Cephalexin  12/19/2011      (please also verify by checking MAR)    Today I evaluated this patient for periodic reassessment of medical and functional status. The patient was discussed in detail at the treatment team meeting focusing on current medical issues, progress in therapies, social issues, psychological issues, barriers to progress and strategies to address these barriers, and discharge planning. See the hand written addendum to rehab progress note. The patient continues to be high risk for future disability and their medical and rehabilitation prognosis continue to be good and therefore, we will continue the patient's rehabilitation course as planned. The patient's tentative discharge date was set. Patient and family education was discussed. The patient was made aware of the team discussion regarding their progress. Complex Physical Medicine & Rehab Issues Assess & Plan:   1. Severe abnormality of gait and mobility and impaired self-care and ADL's secondary to acute right hip fracture status post right ANN with severe right wrist strain/sprain. Functional and medical status reassessed regarding patients ability to participate in therapies and patient found to be able to participate in acute intensive comprehensive inpatient rehabilitation program including PT/OT to improve balance, ambulation, ADLs, and to improve the P/AROM. Therapeutic modifications regarding activities in therapies, place, amount of time per day and intensity of therapy made daily. In bed therapies or bedside therapies prn.   2. Bowel severe constipation and Bladder dysfunction:  frequent toileting, ambulate to bathroom with assistance, check post void residuals. Check for C.difficile x1 if >2 loose stools in 24 hours, continue bowel & bladder program.  Monitor bowel and bladder function. Lactinex 2 PO every AC. MOM prn, Brown Bomb prn, Glycerin suppository prn, enema prn. Status post digital disimpaction. I prescribed Miralax prn. I prescribed Colace. 3. Severe right hip and right wrist pain generalized OA pain, clot related left lower extremity pain: reassess pain every shift and prior to and after each therapy session, give prn Tylenol and scheduled Percocet, modalities prn in therapy, Lidoderm, K-pad prn. I reviewed OARRS report, no significant opiates, no significant sedatives.    Pain is better with scheduled

## 2018-01-23 VITALS
BODY MASS INDEX: 33.12 KG/M2 | RESPIRATION RATE: 18 BRPM | HEIGHT: 67 IN | WEIGHT: 211 LBS | SYSTOLIC BLOOD PRESSURE: 128 MMHG | TEMPERATURE: 97 F | OXYGEN SATURATION: 98 % | HEART RATE: 69 BPM | DIASTOLIC BLOOD PRESSURE: 73 MMHG

## 2018-01-23 PROCEDURE — 97110 THERAPEUTIC EXERCISES: CPT

## 2018-01-23 PROCEDURE — 6370000000 HC RX 637 (ALT 250 FOR IP): Performed by: PHYSICAL MEDICINE & REHABILITATION

## 2018-01-23 PROCEDURE — 97116 GAIT TRAINING THERAPY: CPT

## 2018-01-23 PROCEDURE — 99238 HOSP IP/OBS DSCHRG MGMT 30/<: CPT | Performed by: PHYSICAL MEDICINE & REHABILITATION

## 2018-01-23 PROCEDURE — 6370000000 HC RX 637 (ALT 250 FOR IP): Performed by: INTERNAL MEDICINE

## 2018-01-23 RX ADMIN — DOCUSATE SODIUM 100 MG: 100 CAPSULE, LIQUID FILLED ORAL at 09:24

## 2018-01-23 RX ADMIN — ASPIRIN 81 MG: 81 TABLET, COATED ORAL at 09:23

## 2018-01-23 RX ADMIN — RIVAROXABAN 20 MG: 20 TABLET, FILM COATED ORAL at 15:32

## 2018-01-23 ASSESSMENT — PAIN SCALES - GENERAL: PAINLEVEL_OUTOF10: 0

## 2018-01-23 NOTE — DISCHARGE SUMMARY
time OR there is concern for safety  Distance Walked: 150ft  Wheel Chair: 2 - Maximal Assistance Requires up to Maximal Assistance AND requires assistance of one person to walk/operate wheelchair between [address] in [facility]: 0  Stairs: 6 - [address] [facility] [place] goes up and down at least one flight of stairs but requries a side support, handrail, cane, or portable supports, or the activity takes more than a reasonable amount of times, or there are safety considerations,  , Assessment: LTGs met. Occupational therapy: FIMS:  Eatin - Feeds self with adaptive equipment/dentures and/or feeds self with modified diet and/or performs own tube feeding  Groomin - Patient independent with all grooming tasks  Bathin - Able to bathe all 10 areas with device  Dressing-Upper: 7 - Patient independently dresses upper body  Dressing-Lower: 5 - Requires setup/supervision/cues and/or staff applies TEDS/prosthesis/brace only  Toiletin - Requires device (grab bar/walker/etc.)  Toilet Transfer: 6 - Independent with device (grab bar/walker/slide bar)  Tub Transfer: 0 - Activity does not occur  Shower Transfer: 1 - Total assistance, pt. expends less than 25% effort,  , Assessment: Pt. demonstrated decreased balance, strength, endurance and recall of precautions, which impacts her ability to safely perform ADL tasks. Pt. would benefit from continued skilled OT to assess these deficits and promote increased independence with ADL tasks.     Speech therapy: FIMS: Comprehension: 6 - Complex ideas 90% or device (hearing aid/glasses)  Expression: 6 - Device used to express complex ideas/needs  Social Interaction: 7 - Patient has appropriate behavior/relations 100% of the time  Problem Solvin - Independent with device (e.g. notes, schedules)  Memory: 6 - Patient requires device to recall (e.g. memory book)      Lab/X-ray studies reviewed, analyzed and discussed with patient and staff:   No results found for this or any previous visit (from the past 24 hour(s)). Xr Femur Right (min 2 Views)  Result Date: 12/29/2017  CONCLUSION: RIGHT FEMORAL NECK FRACTURE WITH VARUS DEFORMITY. SMALL BIBASILAR DEPENDENT PULMONARY ATELECTASIS. Xr Knee Right (3 Views)  Result Date: 12/29/2017  CONCLUSION: RIGHT FEMORAL NECK FRACTURE WITH VARUS DEFORMITY. SMALL BIBASILAR DEPENDENT PULMONARY ATELECTASIS. Previous extensive, complex labs, notes and diagnostics reviewed and analyzed. ALLERGIES:    Allergies as of 01/02/2018 - Review Complete 01/02/2018   Allergen Reaction Noted    Cephalexin  12/19/2011      (please also verify by checking [facility])          Complex Physical Medicine & Rehab Issues Assess & Plan:   1. Severe abnormality of gait and mobility and impaired self-care and ADL's secondary to acute right hip fracture status post right ANN with severe right wrist strain/sprain. Functional and medical status have improved greatly after acute rehabilitation stay at [facility].  The patient has completed the acute rehabilitation program and is ready for discharge today to home alone with occasional supervision and home health PT, OT, RN, aide and . Patient and family education is complete. The patient is to follow-up with their family physician after discharge. Complex Active General Medical Issues that complicated care to be followed by family physician:    1. Fracture, hip, right, open type I or II, -status post right total hip arthroplasty - weightbearing as tolerated, follow-up with orthopedic surgeon. Incision healing well, staples are out. 2.  History of Panic attacks - status post recreational therapy, spiritual care, and music therapy, adjustment to disability support group. 3.  Type 2 diabetes mellitus-diet controlled, hyperglycemia, Blood sugar 133 without Glucophage.         4.   Venous stasis dermatitis, Seborrheic keratosis,  Punctate keratitis -  ET mix

## 2018-01-23 NOTE — PROGRESS NOTES
 Essential hypertension [I10] 01/08/2016    History of CVA (cerebral vascular accident) 2016 ([facility] [I63.9] 01/01/2016    Seborrheic keratosis [L82.1] 03/26/2014    Venous stasis dermatitis [I87.2] 10/31/2013    Type 2 diabetes mellitus ([facility] [E11.9] 10/31/2013    Panic attacks [F41.0]      Right total hip status post ANN currently undergoing physical therapy: Pain management, DVT prophylaxis, PT and OT as per primary physician. Type 2 diabetes: Glucose levels relatively well-controlled at this time    History of TIA in the past: Continue statin recommend adding antiplatelet agent 81 milligram aspirin daily    SVT: Continue Xarelto 20 Daily without loading dose due to recurrent epistaxis. US shows:    NEGATIVE FOR ACUTE DVT OF THE LEFT LOWER EXTREMITY FROM THE GROIN TO THE KNEE. INCIDENTAL THROMBUS NOTED WITHIN THE LEFT GREATER AND LESSER SAPHENOUS VEINS. Dizziness lightheadedness: CBC BMP pending today. Encourage oral intake of water initial orthostatic vitals negative. Patient has been receiving scheduled Percocet however does feel that Tylenol is controlling her pain. This point recommend decreasing narcotic analgesia as tolerated this may be contributing to dizziness and lightheadedness. Additional work up or/and treatment plan may be added today or then after based on clinical progression. I am managing a portion of pt care. Some medical issues are handled by other specialists. Additional work up and treatment should be done in out pt setting by pt PCP and other out pt providers. In addition to examining and evaluating pt, I spent additional time explaining care, normal and abnormal findings, and treatment plan. All of pt questions were answered. Counseling, diet and education were  provided. Case will be discussed with nursing staff when appropriate. Family will be updated if and when appropriate.       Diet: DIET CARB CONTROL; Carb Control: 3 carbs/meal (approximate 1500

## 2018-01-23 NOTE — PROGRESS NOTES
Physical Therapy Rehab Treatment Note  Facility/Department: [facility]  Room: [facility]/G041-36       NAME: Iza Patricio  : 1935 (11 y.o.)  MRN: [id number]  CODE STATUS: Full Code    Date of Service: 2018  Family / Caregiver Present: No  Restrictions:  Restrictions/Precautions: Fall Risk    SUBJECTIVE: Subjective: \"Nothing is new. I feel good\"  Pain Screening  Patient Currently in Pain: No     Post Treatment Pain Screenin/10     OBJECTIVE:      Transfers  Sit to Stand: Modified independent  Stand to sit: Modified independent    Ambulation 1  Surface: level tile;carpet  Device: Rolling Walker  Assistance: Modified Independent  Quality of Gait: Mild antalgic gait pattern  Distance: 200ft          exercises   Reviewed sink ex for HEP x10 ea. VCs for technique.   Educated pt to complete seated and supine ex for HEP but hold sink ex on own and progress to with home health therapist.       ASSESSMENT/COMMENTS:Goals met     PLAN OF CARE/Safety: D/C     Therapy Time:Delayed d/t pills   Individual   Time In 935   Time Out 1000   Minutes 25     Minutes:      Transfer/Bed mobility training:      Gait training: 10      Neuro re education:     Therapeutic ex:15      Jose Baird PTA, 18 at 9:54 AM

## 2018-01-24 ENCOUNTER — CARE COORDINATION (OUTPATIENT)
Dept: CASE MANAGEMENT | Age: 83
End: 2018-01-24

## 2018-01-24 DIAGNOSIS — R26.9 ABNORMALITY OF GAIT AND MOBILITY: Primary | ICD-10-CM

## 2018-01-24 PROCEDURE — 1111F DSCHRG MED/CURRENT MED MERGE: CPT | Performed by: FAMILY MEDICINE

## 2018-01-24 NOTE — CARE COORDINATION
[facility] AT [facility])  Patient DME:  Wheelchair, Mechele Plater, Chair lift  Do you have support at home?:  Alone  Do you feel like you have everything you need to keep you well at home?:  Yes  Are you an active caregiver in your home?:  No  Care Transitions Interventions         Follow Up  Future Appointments  Date Time Provider Scarlet Bustos   4/18/2018 9:45 AM SCHEDULE, LAB [facility] PCP [facility]   4/25/2018 9:00 AM DO Carri Hutson. [address]

## 2018-02-05 ENCOUNTER — HOSPITAL ENCOUNTER (EMERGENCY)
Age: 83
Discharge: HOME | End: 2018-02-05
Payer: MEDICARE

## 2018-02-05 ENCOUNTER — APPOINTMENT (OUTPATIENT)
Dept: ULTRASOUND IMAGING | Age: 83
End: 2018-02-05
Payer: MEDICARE

## 2018-02-05 VITALS
DIASTOLIC BLOOD PRESSURE: 84 MMHG | HEIGHT: 67 IN | SYSTOLIC BLOOD PRESSURE: 150 MMHG | TEMPERATURE: 97.7 F | RESPIRATION RATE: 14 BRPM | BODY MASS INDEX: 31.39 KG/M2 | HEART RATE: 86 BPM | OXYGEN SATURATION: 98 % | WEIGHT: 200 LBS

## 2018-02-05 DIAGNOSIS — I80.02 THROMBOPHLEBITIS OF SUPERFICIAL VEINS OF LEFT LOWER EXTREMITY: Primary | ICD-10-CM

## 2018-02-05 PROCEDURE — 93971 EXTREMITY STUDY: CPT

## 2018-02-05 PROCEDURE — 99283 EMERGENCY DEPT VISIT LOW MDM: CPT

## 2018-02-05 ASSESSMENT — ENCOUNTER SYMPTOMS
NAUSEA: 0
ABDOMINAL PAIN: 0
COUGH: 0
VOMITING: 0
SHORTNESS OF BREATH: 0
DIARRHEA: 0

## 2018-02-05 NOTE — ED PROVIDER NOTES
her to follow-up with her family physician in one day. Patient verbalized understanding of this plan. Patient stable and ready for discharge. PROCEDURES:  Unless otherwise noted below, none     Procedures      FINAL IMPRESSION      1.  Thrombophlebitis of superficial veins of left lower extremity          DISPOSITION/PLAN   DISPOSITION Decision To Discharge 02/05/2018 01:58:38 PM          Raya Sigala (electronically signed)  Attending Emergency Physician       Abdoul Zhao PA-C  02/05/18 [id number]

## 2018-02-09 ENCOUNTER — OFFICE VISIT (OUTPATIENT)
Dept: FAMILY MEDICINE CLINIC | Age: 83
End: 2018-02-09
Payer: MEDICARE

## 2018-02-09 ENCOUNTER — HOSPITAL ENCOUNTER (OUTPATIENT)
Dept: ORTHOPEDIC SURGERY | Age: 83
Discharge: HOME | End: 2018-02-11
Payer: MEDICARE

## 2018-02-09 VITALS
HEART RATE: 76 BPM | RESPIRATION RATE: 16 BRPM | HEIGHT: 67 IN | WEIGHT: 201 LBS | SYSTOLIC BLOOD PRESSURE: 128 MMHG | DIASTOLIC BLOOD PRESSURE: 64 MMHG | TEMPERATURE: 97.3 F | BODY MASS INDEX: 31.55 KG/M2

## 2018-02-09 DIAGNOSIS — E78.2 MIXED HYPERLIPIDEMIA: ICD-10-CM

## 2018-02-09 DIAGNOSIS — M25.551 PAIN IN JOINT INVOLVING RIGHT PELVIC REGION AND THIGH: ICD-10-CM

## 2018-02-09 DIAGNOSIS — D50.0 IRON DEFICIENCY ANEMIA DUE TO CHRONIC BLOOD LOSS: ICD-10-CM

## 2018-02-09 DIAGNOSIS — I10 ESSENTIAL HYPERTENSION: ICD-10-CM

## 2018-02-09 DIAGNOSIS — E11.9 TYPE 2 DIABETES MELLITUS WITHOUT COMPLICATION, WITHOUT LONG-TERM CURRENT USE OF INSULIN (HCC): Primary | ICD-10-CM

## 2018-02-09 DIAGNOSIS — I82.812 SAPHENOUS VEIN CLOT, LEFT: ICD-10-CM

## 2018-02-09 PROCEDURE — 99214 OFFICE O/P EST MOD 30 MIN: CPT | Performed by: FAMILY MEDICINE

## 2018-02-09 PROCEDURE — 73502 X-RAY EXAM HIP UNI 2-3 VIEWS: CPT

## 2018-02-09 ASSESSMENT — ENCOUNTER SYMPTOMS
EYES NEGATIVE: 1
GASTROINTESTINAL NEGATIVE: 1
RESPIRATORY NEGATIVE: 1
ALLERGIC/IMMUNOLOGIC NEGATIVE: 1

## 2018-02-09 NOTE — PROGRESS NOTES
Procedure Laterality Date    APPENDECTOMY  1952    COLONOSCOPY      EYE SURGERY      FRACTURE SURGERY      L wrist    MD PARTIAL HIP REPLACEMENT Right 12/30/2017    RIGHT HIP HEMIARTHROPLASTY performed by Guillermina Ortega MD at [address] [facility]  4/4/03     Social History     Social History    Marital status:      Spouse name: N/A    Number of children: 10    Years of education: N/A     Occupational History    Housewife      Social History Main Topics    Smoking status: Never Smoker    Smokeless tobacco: Never Used    Alcohol use No    Drug use: No    Sexual activity: Not on file     Other Topics Concern    Not on file     Social History Narrative    The patient lives alone in a one story home with two steps to enter the home. The bedroom and bathroom are on the first floor. Her social support includes her son. She has a walker and wheelchair at home. She was not receiving community services prior to admission. She has history of falls prior to admission. She was independent with mobility and self care prior to admission. Her goal is to get stronger and return home. Family History   Problem Relation Age of Onset    Heart Disease Mother     Cancer Brother      LUNG    Cancer Sister      BREAST/BONE     Allergies   Allergen Reactions    Cephalexin      Current Outpatient Prescriptions on File Prior to Visit   Medication Sig Dispense Refill    rivaroxaban (XARELTO) 20 MG TABS tablet Take 1 tablet by mouth daily 40 tablet 0    miconazole (MICOTIN) 2 % powder Apply topically 2 times daily. 45 g 1    lidocaine (LIDODERM) 5 % Place 3 patches onto the skin daily 12 hours on, 12 hours off.  30 patch 0    fluticasone (FLONASE) 50 MCG/ACT nasal spray 2 sprays by Nasal route daily 1 Bottle 3    acetaminophen (TYLENOL) 325 MG tablet Take 2 tablets by mouth every 4 hours as needed for Pain 60 tablet 0    atorvastatin (LIPITOR) 10 MG tablet TAKE 1 TABLET DAILY 90 tablet 0    aspirin EC 81 MG EC tablet Take 1 tablet by mouth daily. 30 tablet 3     No current facility-administered medications on file prior to visit. Objective    Vitals:    02/09/18 1132   BP: 128/64   Pulse: 76   Resp: 16   Temp: 97.3 °F (36.3 °C)   TempSrc: Oral   Weight: 201 lb (91.2 kg)   Height: 5' 7\" (1.702 m)     Physical Exam   Constitutional: Vital signs are normal. She appears well-developed and well-nourished. No distress. HENT:   Head: Normocephalic and atraumatic. Right Ear: Tympanic membrane, external ear and ear canal normal. Tympanic membrane is not erythematous and not retracted. No middle ear effusion. Left Ear: Tympanic membrane, external ear and ear canal normal. Tympanic membrane is not erythematous and not retracted. No middle ear effusion. Nose: Nose normal. No mucosal edema, rhinorrhea or septal deviation. Right sinus exhibits no maxillary sinus tenderness and no frontal sinus tenderness. Left sinus exhibits no maxillary sinus tenderness and no frontal sinus tenderness. Mouth/Throat: Uvula is midline, oropharynx is clear and moist and mucous membranes are normal.   Eyes: Conjunctivae, EOM and lids are normal. Pupils are equal, round, and reactive to light. Neck: Trachea normal and normal range of motion. Neck supple. No JVD present. Carotid bruit is not present. No tracheal deviation present. No thyroid mass and no thyromegaly present. Cardiovascular: Normal rate, regular rhythm, S1 normal, S2 normal, normal heart sounds, intact distal pulses and normal pulses. Pulmonary/Chest: Effort normal and breath sounds normal. No respiratory distress. She has no decreased breath sounds. She has no wheezes. She has no rhonchi. She has no rales. She exhibits no tenderness and no deformity. Abdominal: Soft. Normal appearance and bowel sounds are normal. She exhibits no distension. There is no splenomegaly or hepatomegaly. There is no tenderness.  There is no rigidity, no rebound, no guarding and no CVA tenderness. No hernia. Musculoskeletal:        Left hip: She exhibits tenderness ( greater saph area ) and swelling (greater saph area ). Right knee: Normal.        Left knee: Normal.        Cervical back: Normal.        Thoracic back: Normal.        Lumbar back: Normal.        Legs:  Lymphadenopathy:     She has no cervical adenopathy. Neurological: She is alert. She has normal strength. No cranial nerve deficit or sensory deficit. Coordination and gait normal.   Skin: Skin is warm, dry and intact. No rash noted. No erythema. Psychiatric: She has a normal mood and affect. Her speech is normal. Judgment and thought content normal. Cognition and memory are normal.            Assessment & Plan   1. Type 2 diabetes mellitus without complication, without long-term current use of insulin ([facility])  metFORMIN (GLUCOPHAGE) 500 MG tablet    Comprehensive Metabolic Panel    Hemoglobin A1C    Lipid Panel    TSH ULTRASENSITIVE, DIRECTED    Microalbumin / Creatinine Urine Ratio   2. Saphenous vein clot, left     3. Essential hypertension  CBC Auto Differential    Comprehensive Metabolic Panel    Lipid Panel    TSH ULTRASENSITIVE, DIRECTED    Microalbumin / Creatinine Urine Ratio   4. Mixed hyperlipidemia  Comprehensive Metabolic Panel    Lipid Panel    TSH ULTRASENSITIVE, DIRECTED   5.  Iron deficiency anemia due to chronic blood loss  CBC Auto Differential    Iron and TIBC    Reticulocytes     Orders Placed This Encounter   Procedures    CBC Auto Differential     Standing Status:   Future     Standing Expiration Date:   2/9/2019    Comprehensive Metabolic Panel     Standing Status:   Future     Standing Expiration Date:   2/9/2019    Hemoglobin A1C     Standing Status:   Future     Standing Expiration Date:   2/9/2019    Lipid Panel     Standing Status:   Future     Standing Expiration Date:   2/9/2019     Order Specific Question:   Is Patient Fasting?/# of Hours

## 2018-02-09 NOTE — PATIENT INSTRUCTIONS
Thank you for enrolling in [address] Please follow the instructions below to securely access your online medical record. [facility] allows you to send messages to your doctor, view your test results, renew your prescriptions, schedule appointments, and more. How Do I Sign Up? 1. In your Internet browser, go to [email or web address]  2. Click on the Sign Up Now link in the Sign In box. You will see the New Member Sign Up page. 3. Enter your [facility] Access Code exactly as it appears below. You will not need to use this code after youve completed the sign-up process. If you do not sign up before the expiration date, you must request a new code. [facility] Access Code: [email or web address]  Expires: 3/3/2018 12:20 PM    4. Enter your Social Security Number ([id number]) and Date of Birth (mm/dd/yyyy) as indicated and click Submit. You will be taken to the next sign-up page. 5. Create a [facility] ID. This will be your [facility] login ID and cannot be changed, so think of one that is secure and easy to remember. 6. Create a [facility] password. You can change your password at any time. 7. Enter your Password Reset Question and Answer. This can be used at a later time if you forget your password. 8. Enter your e-mail address. You will receive e-mail notification when new information is available in [address] 9. Click Sign Up. You can now view your medical record. Additional Information  If you have questions, please contact your physician practice where you receive care. Remember, [facility] is NOT to be used for urgent needs. For medical emergencies, dial 911.

## 2018-02-15 ENCOUNTER — TELEPHONE (OUTPATIENT)
Dept: FAMILY MEDICINE CLINIC | Age: 83
End: 2018-02-15

## 2018-02-22 ENCOUNTER — TELEPHONE (OUTPATIENT)
Dept: FAMILY MEDICINE CLINIC | Age: 83
End: 2018-02-22

## 2018-03-05 DIAGNOSIS — E11.9 TYPE 2 DIABETES MELLITUS WITHOUT COMPLICATION, WITHOUT LONG-TERM CURRENT USE OF INSULIN (HCC): ICD-10-CM

## 2018-03-05 DIAGNOSIS — E78.2 MIXED HYPERLIPIDEMIA: ICD-10-CM

## 2018-03-05 DIAGNOSIS — I10 ESSENTIAL HYPERTENSION: ICD-10-CM

## 2018-03-05 DIAGNOSIS — D50.0 IRON DEFICIENCY ANEMIA DUE TO CHRONIC BLOOD LOSS: ICD-10-CM

## 2018-03-05 LAB
ALBUMIN SERPL-MCNC: 4.1 G/DL (ref 3.9–4.9)
ALP BLD-CCNC: 84 U/L (ref 40–130)
ALT SERPL-CCNC: 11 U/L (ref 0–33)
ANION GAP SERPL CALCULATED.3IONS-SCNC: 15 MEQ/L (ref 7–13)
AST SERPL-CCNC: 13 U/L (ref 0–35)
BASOPHILS ABSOLUTE: 0.1 K/UL (ref 0–0.2)
BASOPHILS RELATIVE PERCENT: 1.2 %
BILIRUB SERPL-MCNC: 0.4 MG/DL (ref 0–1.2)
BUN BLDV-MCNC: 18 MG/DL (ref 8–23)
CALCIUM SERPL-MCNC: 10.7 MG/DL (ref 8.6–10.2)
CHLORIDE BLD-SCNC: 104 MEQ/L (ref 98–107)
CHOLESTEROL, TOTAL: 174 MG/DL (ref 0–199)
CO2: 21 MEQ/L (ref 22–29)
CREAT SERPL-MCNC: 0.81 MG/DL (ref 0.5–0.9)
CREATININE URINE: 131.6 MG/DL
EOSINOPHILS ABSOLUTE: 0.2 K/UL (ref 0–0.7)
EOSINOPHILS RELATIVE PERCENT: 3.2 %
GFR AFRICAN AMERICAN: >60
GFR NON-AFRICAN AMERICAN: >60
GLOBULIN: 2.6 G/DL (ref 2.3–3.5)
GLUCOSE BLD-MCNC: 132 MG/DL (ref 74–109)
HBA1C MFR BLD: 6 % (ref 4.8–5.9)
HCT VFR BLD CALC: 33.6 % (ref 37–47)
HDLC SERPL-MCNC: 53 MG/DL (ref 40–59)
HEMOGLOBIN: 10.8 G/DL (ref 12–16)
IRON SATURATION: 15 % (ref 11–46)
IRON: 43 UG/DL (ref 37–145)
LDL CHOLESTEROL CALCULATED: 94 MG/DL (ref 0–129)
LYMPHOCYTES ABSOLUTE: 1.3 K/UL (ref 1–4.8)
LYMPHOCYTES RELATIVE PERCENT: 20.6 %
MCH RBC QN AUTO: 28.8 PG (ref 27–31.3)
MCHC RBC AUTO-ENTMCNC: 32.1 % (ref 33–37)
MCV RBC AUTO: 89.7 FL (ref 82–100)
MICROALBUMIN UR-MCNC: 3 MG/DL
MICROALBUMIN/CREAT UR-RTO: 22.8 MG/G (ref 0–30)
MONOCYTES ABSOLUTE: 0.6 K/UL (ref 0.2–0.8)
MONOCYTES RELATIVE PERCENT: 9.9 %
NEUTROPHILS ABSOLUTE: 4 K/UL (ref 1.4–6.5)
NEUTROPHILS RELATIVE PERCENT: 65.1 %
PDW BLD-RTO: 15.8 % (ref 11.5–14.5)
PLATELET # BLD: 450 K/UL (ref 130–400)
POTASSIUM SERPL-SCNC: 4.8 MEQ/L (ref 3.5–5.1)
RBC # BLD: 3.74 M/UL (ref 4.2–5.4)
RETICULOCYTE ABSOLUTE COUNT: 0.04 M/CUMM (ref 0.02–0.11)
RETICULOCYTE COUNT PCT: 1 % (ref 0.6–2.2)
SODIUM BLD-SCNC: 140 MEQ/L (ref 132–144)
TOTAL IRON BINDING CAPACITY: 281 UG/DL (ref 178–450)
TOTAL PROTEIN: 6.7 G/DL (ref 6.4–8.1)
TRIGL SERPL-MCNC: 134 MG/DL (ref 0–200)
TSH REFLEX: 6.39 UIU/ML (ref 0.27–4.2)
WBC # BLD: 6.1 K/UL (ref 4.8–10.8)

## 2018-03-06 LAB — T4 FREE: 1.23 NG/DL (ref 0.93–1.7)

## 2018-03-12 ENCOUNTER — OFFICE VISIT (OUTPATIENT)
Dept: FAMILY MEDICINE CLINIC | Age: 83
End: 2018-03-12
Payer: MEDICARE

## 2018-03-12 VITALS
TEMPERATURE: 97.6 F | HEART RATE: 82 BPM | WEIGHT: 200 LBS | DIASTOLIC BLOOD PRESSURE: 62 MMHG | HEIGHT: 66 IN | RESPIRATION RATE: 16 BRPM | BODY MASS INDEX: 32.14 KG/M2 | SYSTOLIC BLOOD PRESSURE: 118 MMHG | OXYGEN SATURATION: 98 %

## 2018-03-12 DIAGNOSIS — Z91.81 AT HIGH RISK FOR FALLS: ICD-10-CM

## 2018-03-12 DIAGNOSIS — E03.4 HYPOTHYROIDISM DUE TO ACQUIRED ATROPHY OF THYROID: ICD-10-CM

## 2018-03-12 DIAGNOSIS — E78.2 MIXED HYPERLIPIDEMIA: ICD-10-CM

## 2018-03-12 DIAGNOSIS — Z23 NEEDS FLU SHOT: ICD-10-CM

## 2018-03-12 DIAGNOSIS — R53.82 CHRONIC FATIGUE: ICD-10-CM

## 2018-03-12 DIAGNOSIS — E83.52 HYPERCALCEMIA: ICD-10-CM

## 2018-03-12 DIAGNOSIS — I10 ESSENTIAL HYPERTENSION: ICD-10-CM

## 2018-03-12 DIAGNOSIS — R26.9 GAIT ABNORMALITY: ICD-10-CM

## 2018-03-12 DIAGNOSIS — E11.9 TYPE 2 DIABETES MELLITUS WITHOUT COMPLICATION, WITHOUT LONG-TERM CURRENT USE OF INSULIN (HCC): Primary | ICD-10-CM

## 2018-03-12 PROCEDURE — G0008 ADMIN INFLUENZA VIRUS VAC: HCPCS | Performed by: FAMILY MEDICINE

## 2018-03-12 PROCEDURE — 99214 OFFICE O/P EST MOD 30 MIN: CPT | Performed by: FAMILY MEDICINE

## 2018-03-12 PROCEDURE — 96372 THER/PROPH/DIAG INJ SC/IM: CPT | Performed by: FAMILY MEDICINE

## 2018-03-12 PROCEDURE — 90662 IIV NO PRSV INCREASED AG IM: CPT | Performed by: FAMILY MEDICINE

## 2018-03-12 RX ORDER — LEVOTHYROXINE SODIUM 0.03 MG/1
25 TABLET ORAL DAILY
Qty: 30 TABLET | Refills: 5 | Status: SHIPPED | OUTPATIENT
Start: 2018-03-12 | End: 2018-01-01 | Stop reason: SDUPTHER

## 2018-03-12 RX ORDER — CYANOCOBALAMIN 1000 UG/ML
1000 INJECTION INTRAMUSCULAR; SUBCUTANEOUS ONCE
Status: COMPLETED | OUTPATIENT
Start: 2018-03-12 | End: 2018-03-12

## 2018-03-12 RX ADMIN — CYANOCOBALAMIN 1000 MCG: 1000 INJECTION INTRAMUSCULAR; SUBCUTANEOUS at 11:38

## 2018-03-12 ASSESSMENT — ENCOUNTER SYMPTOMS
EYES NEGATIVE: 1
RESPIRATORY NEGATIVE: 1
ALLERGIC/IMMUNOLOGIC NEGATIVE: 1
GASTROINTESTINAL NEGATIVE: 1

## 2018-03-12 NOTE — PROGRESS NOTES
Eyes: Negative. Respiratory: Negative. Cardiovascular: Negative. Gastrointestinal: Negative. Endocrine: Negative. Genitourinary: Negative. Musculoskeletal: Positive for arthralgias and gait problem. Skin: Negative. Allergic/Immunologic: Negative. Hematological: Negative. Psychiatric/Behavioral: Negative. Past Medical History:   Diagnosis Date    Abnormality of gait and mobility     Chronic kidney disease     Closed fracture of distal end of left radius 7/19/2017 July 2017-[facility]    CVA (cerebral vascular accident) ([address] 01/2016    Essential hypertension 1/8/2016    Fracture of hip, closed, right, initial encounter ([address] 12/29/2017    H/O total hip arthroplasty, right 12/30/2017    History of TIA (transient ischemic attack) 12/31/2017    Hypercholesteremia 1/8/2016    Hyperlipidemia     Obesity (BMI 30.0-34. 9)     BMI 33.2    Panic attacks     Postoperative anemia 12/31/2017    Type 2 diabetes mellitus without complication (HCC)     Urinary incontinence     Venous stasis dermatitis 10/31/2013    Vitamin D deficiency 10/24/2017     Past Surgical History:   Procedure Laterality Date    APPENDECTOMY  1952    COLONOSCOPY      EYE SURGERY      FRACTURE SURGERY      L wrist    AL PARTIAL HIP REPLACEMENT Right 12/30/2017    RIGHT HIP HEMIARTHROPLASTY performed by Brenda Hinton MD at [address]  4/4/03     Social History     Social History    Marital status:      Spouse name: N/A    Number of children: 10    Years of education: N/A     Occupational History    Housewife      Social History Main Topics    Smoking status: Never Smoker    Smokeless tobacco: Never Used    Alcohol use No    Drug use: No    Sexual activity: Not on file     Other Topics Concern    Not on file     Social History Narrative    The patient lives alone in a one story home with two steps to enter the home.   The bedroom and bathroom are on the first floor. Her social support includes her son. She has a walker and wheelchair at home. She was not receiving community services prior to admission. She has history of falls prior to admission. She was independent with mobility and self care prior to admission. Her goal is to get stronger and return home. Family History   Problem Relation Age of Onset    Heart Disease Mother     Cancer Brother      LUNG    Cancer Sister      BREAST/BONE     Allergies   Allergen Reactions    Cephalexin      Current Outpatient Prescriptions on File Prior to Visit   Medication Sig Dispense Refill    miconazole (MICOTIN) 2 % powder Apply topically 2 times daily. 45 g 1    lidocaine (LIDODERM) 5 % Place 3 patches onto the skin daily 12 hours on, 12 hours off. 30 patch 0    fluticasone (FLONASE) 50 MCG/ACT nasal spray 2 sprays by Nasal route daily 1 Bottle 3    acetaminophen (TYLENOL) 325 MG tablet Take 2 tablets by mouth every 4 hours as needed for Pain 60 tablet 0    atorvastatin (LIPITOR) 10 MG tablet TAKE 1 TABLET DAILY 90 tablet 0    aspirin EC 81 MG EC tablet Take 1 tablet by mouth daily. 30 tablet 3     No current facility-administered medications on file prior to visit. Objective    Vitals:    03/12/18 1053   BP: 118/62   Pulse: 82   Resp: 16   Temp: 97.6 °F (36.4 °C)   TempSrc: Tympanic   SpO2: 98%   Weight: 200 lb (90.7 kg)   Height: 5' 6\" (1.676 m)     Physical Exam   Constitutional: Vital signs are normal. She appears well-developed and well-nourished. No distress. HENT:   Head: Normocephalic and atraumatic. Right Ear: Tympanic membrane, external ear and ear canal normal. Tympanic membrane is not erythematous and not retracted. No middle ear effusion. Left Ear: Tympanic membrane, external ear and ear canal normal. Tympanic membrane is not erythematous and not retracted. No middle ear effusion. Nose: Nose normal. No mucosal edema, rhinorrhea or septal deviation.  Right sinus exhibits no maxillary sinus tenderness and no frontal sinus tenderness. Left sinus exhibits no maxillary sinus tenderness and no frontal sinus tenderness. Mouth/Throat: Uvula is midline, oropharynx is clear and moist and mucous membranes are normal.   Eyes: Conjunctivae, EOM and lids are normal. Pupils are equal, round, and reactive to light. Neck: Trachea normal and normal range of motion. Neck supple. No JVD present. Carotid bruit is not present. No tracheal deviation present. No thyroid mass and no thyromegaly present. Cardiovascular: Normal rate, regular rhythm, S1 normal, S2 normal, normal heart sounds, intact distal pulses and normal pulses. Pulmonary/Chest: Effort normal and breath sounds normal. No respiratory distress. She has no decreased breath sounds. She has no wheezes. She has no rhonchi. She has no rales. She exhibits no tenderness and no deformity. Abdominal: Soft. Normal appearance and bowel sounds are normal. She exhibits no distension. There is no splenomegaly or hepatomegaly. There is no tenderness. There is no rigidity, no rebound, no guarding and no CVA tenderness. No hernia. Musculoskeletal:        Right knee: Normal.        Left knee: Normal.        Cervical back: Normal.        Thoracic back: Normal.        Lumbar back: Normal.   Lymphadenopathy:     She has no cervical adenopathy. Neurological: She is alert. She has normal strength. No cranial nerve deficit or sensory deficit. Coordination and gait normal.   Skin: Skin is warm, dry and intact. No rash noted. No erythema. Psychiatric: She has a normal mood and affect. Her speech is normal. Judgment and thought content normal. Cognition and memory are normal.            Assessment & Plan   1. Type 2 diabetes mellitus without complication, without long-term current use of insulin ([facility])  Comprehensive Metabolic Panel    Hemoglobin A1C    Lipid Panel    Microalbumin / Creatinine Urine Ratio    TSH ULTRASENSITIVE, DIRECTED   2. each     Refill:  0    levothyroxine (SYNTHROID) 25 MCG tablet     Sig: Take 1 tablet by mouth Daily     Dispense:  30 tablet     Refill:  5    cyanocobalamin injection 1,000 mcg     Medications Discontinued During This Encounter   Medication Reason    metFORMIN (GLUCOPHAGE) 500 MG tablet     rivaroxaban (XARELTO) 20 MG TABS tablet     Handicap Placard MISC    maintain blood pressure log. Concern regarding increase in blood pressure was reviewed with initiation of levothyroxine. Counseling given: Yes      Return in about 3 months (around 6/12/2018).     Braxton Wylie DO

## 2018-03-27 ENCOUNTER — HOSPITAL ENCOUNTER (OUTPATIENT)
Dept: PHYSICAL THERAPY | Age: 83
Setting detail: THERAPIES SERIES
Discharge: HOME | End: 2018-03-27
Payer: MEDICARE

## 2018-03-27 PROCEDURE — G8978 MOBILITY CURRENT STATUS: HCPCS

## 2018-03-27 PROCEDURE — G8979 MOBILITY GOAL STATUS: HCPCS

## 2018-03-27 PROCEDURE — 97161 PT EVAL LOW COMPLEX 20 MIN: CPT

## 2018-03-27 PROCEDURE — 97110 THERAPEUTIC EXERCISES: CPT

## 2018-03-27 NOTE — PROGRESS NOTES
Walking and Moving Around Current Status (): At least 40 percent but less than 60 percent impaired, limited or restricted  Mobility: Walking and Moving Around Goal Status (): At least 1 percent but less than 20 percent impaired, limited or restricted    PLAN: [x] Evaluate and Treat  Frequency/Duration:  Plan  Times per week: 2-3  Plan weeks: 4-6  Current Treatment Recommendations: Strengthening, ROM, Balance Training, Gait Training, Manual Therapy - Soft Tissue Mobilization, Manual Therapy - Joint Manipulation, Modalities, Home Exercise Program, Safety Education & Training  Plan Comment: Transfer pt care to Saul Ramirez PT     Precautions: Posterior Hip Precautions             ? Patient Status:[x] Continue/ Initiate plan of Care    [] Discharge PT. Recommend pt continue with HEP. [] Additional visits requested, Please re-certify for additional visits:          Signature: Electronically signed by Karla Perez PT on 3/27/18 at 2:28 PM      If you have any questions or concerns, please don't hesitate to call. Thank you for your referral.    I have reviewed this plan of care and certify a need for medically necessary rehabilitation services.     Physician Signature:__________________________________________________________  Date:  Please sign and return

## 2018-04-02 ENCOUNTER — HOSPITAL ENCOUNTER (OUTPATIENT)
Dept: PHYSICAL THERAPY | Age: 83
Setting detail: THERAPIES SERIES
Discharge: HOME | End: 2018-04-02
Payer: MEDICARE

## 2018-04-02 PROCEDURE — 97110 THERAPEUTIC EXERCISES: CPT

## 2018-04-04 ENCOUNTER — HOSPITAL ENCOUNTER (OUTPATIENT)
Dept: PHYSICAL THERAPY | Age: 83
Setting detail: THERAPIES SERIES
Discharge: HOME | End: 2018-04-04
Payer: MEDICARE

## 2018-04-04 PROCEDURE — 97110 THERAPEUTIC EXERCISES: CPT

## 2018-04-06 ENCOUNTER — HOSPITAL ENCOUNTER (OUTPATIENT)
Dept: PHYSICAL THERAPY | Age: 83
Setting detail: THERAPIES SERIES
Discharge: HOME | End: 2018-04-06
Payer: MEDICARE

## 2018-04-06 PROCEDURE — 97110 THERAPEUTIC EXERCISES: CPT

## 2018-04-09 ENCOUNTER — HOSPITAL ENCOUNTER (OUTPATIENT)
Dept: PHYSICAL THERAPY | Age: 83
Setting detail: THERAPIES SERIES
Discharge: HOME | End: 2018-04-09
Payer: MEDICARE

## 2018-04-09 PROCEDURE — 97110 THERAPEUTIC EXERCISES: CPT

## 2018-04-11 ENCOUNTER — HOSPITAL ENCOUNTER (OUTPATIENT)
Dept: PHYSICAL THERAPY | Age: 83
Setting detail: THERAPIES SERIES
Discharge: HOME | End: 2018-04-11
Payer: MEDICARE

## 2018-04-11 PROCEDURE — 97110 THERAPEUTIC EXERCISES: CPT

## 2018-04-13 ENCOUNTER — HOSPITAL ENCOUNTER (OUTPATIENT)
Dept: PHYSICAL THERAPY | Age: 83
Setting detail: THERAPIES SERIES
Discharge: HOME | End: 2018-04-13
Payer: MEDICARE

## 2018-04-13 ENCOUNTER — HOSPITAL ENCOUNTER (OUTPATIENT)
Dept: ORTHOPEDIC SURGERY | Age: 83
Discharge: HOME | End: 2018-04-15
Payer: MEDICARE

## 2018-04-13 DIAGNOSIS — M16.9 ARTHROSIS OF HIP: ICD-10-CM

## 2018-04-13 PROCEDURE — 97110 THERAPEUTIC EXERCISES: CPT

## 2018-04-13 PROCEDURE — 73502 X-RAY EXAM HIP UNI 2-3 VIEWS: CPT

## 2018-04-16 ENCOUNTER — HOSPITAL ENCOUNTER (OUTPATIENT)
Dept: PHYSICAL THERAPY | Age: 83
Setting detail: THERAPIES SERIES
Discharge: HOME | End: 2018-04-16
Payer: MEDICARE

## 2018-04-16 PROCEDURE — 97110 THERAPEUTIC EXERCISES: CPT

## 2018-04-18 ENCOUNTER — HOSPITAL ENCOUNTER (OUTPATIENT)
Dept: PHYSICAL THERAPY | Age: 83
Setting detail: THERAPIES SERIES
Discharge: HOME | End: 2018-04-18
Payer: MEDICARE

## 2018-04-18 DIAGNOSIS — E78.2 MIXED HYPERLIPIDEMIA: ICD-10-CM

## 2018-04-18 DIAGNOSIS — E11.9 TYPE 2 DIABETES MELLITUS WITHOUT COMPLICATION, WITHOUT LONG-TERM CURRENT USE OF INSULIN (HCC): ICD-10-CM

## 2018-04-18 DIAGNOSIS — E03.4 HYPOTHYROIDISM DUE TO ACQUIRED ATROPHY OF THYROID: ICD-10-CM

## 2018-04-18 DIAGNOSIS — E83.52 HYPERCALCEMIA: ICD-10-CM

## 2018-04-18 DIAGNOSIS — I10 ESSENTIAL HYPERTENSION: ICD-10-CM

## 2018-04-18 LAB
ALBUMIN SERPL-MCNC: 3.9 G/DL (ref 3.9–4.9)
ALP BLD-CCNC: 99 U/L (ref 40–130)
ALT SERPL-CCNC: 10 U/L (ref 0–33)
ANION GAP SERPL CALCULATED.3IONS-SCNC: 12 MEQ/L (ref 7–13)
AST SERPL-CCNC: 13 U/L (ref 0–35)
BASOPHILS ABSOLUTE: 0.1 K/UL (ref 0–0.2)
BASOPHILS RELATIVE PERCENT: 0.9 %
BILIRUB SERPL-MCNC: 0.3 MG/DL (ref 0–1.2)
BUN BLDV-MCNC: 20 MG/DL (ref 8–23)
CALCIUM SERPL-MCNC: 10.5 MG/DL (ref 8.6–10.2)
CHLORIDE BLD-SCNC: 107 MEQ/L (ref 98–107)
CHOLESTEROL, TOTAL: 180 MG/DL (ref 0–199)
CO2: 24 MEQ/L (ref 22–29)
CREAT SERPL-MCNC: 0.83 MG/DL (ref 0.5–0.9)
CREATININE URINE: 149.4 MG/DL
EOSINOPHILS ABSOLUTE: 0.2 K/UL (ref 0–0.7)
EOSINOPHILS RELATIVE PERCENT: 3.3 %
GFR AFRICAN AMERICAN: >60
GFR NON-AFRICAN AMERICAN: >60
GLOBULIN: 2.5 G/DL (ref 2.3–3.5)
GLUCOSE BLD-MCNC: 141 MG/DL (ref 74–109)
HBA1C MFR BLD: 6.8 % (ref 4.8–5.9)
HCT VFR BLD CALC: 35.4 % (ref 37–47)
HDLC SERPL-MCNC: 49 MG/DL (ref 40–59)
HEMOGLOBIN: 11.7 G/DL (ref 12–16)
LDL CHOLESTEROL CALCULATED: 110 MG/DL (ref 0–129)
LYMPHOCYTES ABSOLUTE: 1.4 K/UL (ref 1–4.8)
LYMPHOCYTES RELATIVE PERCENT: 26.2 %
MCH RBC QN AUTO: 28.6 PG (ref 27–31.3)
MCHC RBC AUTO-ENTMCNC: 33 % (ref 33–37)
MCV RBC AUTO: 86.7 FL (ref 82–100)
MICROALBUMIN UR-MCNC: 1.4 MG/DL
MICROALBUMIN/CREAT UR-RTO: 9.4 MG/G (ref 0–30)
MONOCYTES ABSOLUTE: 0.5 K/UL (ref 0.2–0.8)
MONOCYTES RELATIVE PERCENT: 9.1 %
NEUTROPHILS ABSOLUTE: 3.3 K/UL (ref 1.4–6.5)
NEUTROPHILS RELATIVE PERCENT: 60.5 %
PARATHYROID HORMONE INTACT: 99 PG/ML (ref 15–65)
PDW BLD-RTO: 15.5 % (ref 11.5–14.5)
PLATELET # BLD: 437 K/UL (ref 130–400)
POTASSIUM SERPL-SCNC: 4.5 MEQ/L (ref 3.5–5.1)
RBC # BLD: 4.09 M/UL (ref 4.2–5.4)
SODIUM BLD-SCNC: 143 MEQ/L (ref 132–144)
TOTAL PROTEIN: 6.4 G/DL (ref 6.4–8.1)
TRIGL SERPL-MCNC: 103 MG/DL (ref 0–200)
TSH REFLEX: 4.17 UIU/ML (ref 0.27–4.2)
WBC # BLD: 5.5 K/UL (ref 4.8–10.8)

## 2018-04-18 PROCEDURE — 97110 THERAPEUTIC EXERCISES: CPT

## 2018-04-20 ENCOUNTER — HOSPITAL ENCOUNTER (OUTPATIENT)
Dept: PHYSICAL THERAPY | Age: 83
Setting detail: THERAPIES SERIES
Discharge: HOME | End: 2018-04-20
Payer: MEDICARE

## 2018-04-20 PROCEDURE — 97110 THERAPEUTIC EXERCISES: CPT

## 2018-04-20 PROCEDURE — G8978 MOBILITY CURRENT STATUS: HCPCS

## 2018-04-20 PROCEDURE — G8979 MOBILITY GOAL STATUS: HCPCS

## 2018-04-22 LAB
VITAMIN D2 AND D3, TOTAL: 19.7 NG/ML (ref 30–80)
VITAMIN D2, 25 HYDROXY: 6.4 NG/ML
VITAMIN D3,25 HYDROXY: 13.3 NG/ML

## 2018-04-23 ENCOUNTER — HOSPITAL ENCOUNTER (OUTPATIENT)
Dept: PHYSICAL THERAPY | Age: 83
Setting detail: THERAPIES SERIES
Discharge: HOME | End: 2018-04-23
Payer: MEDICARE

## 2018-04-23 PROCEDURE — 97110 THERAPEUTIC EXERCISES: CPT

## 2018-04-25 ENCOUNTER — HOSPITAL ENCOUNTER (OUTPATIENT)
Dept: PHYSICAL THERAPY | Age: 83
Setting detail: THERAPIES SERIES
Discharge: HOME | End: 2018-04-25
Payer: MEDICARE

## 2018-04-25 PROCEDURE — 97110 THERAPEUTIC EXERCISES: CPT

## 2018-04-27 ENCOUNTER — HOSPITAL ENCOUNTER (OUTPATIENT)
Dept: PHYSICAL THERAPY | Age: 83
Setting detail: THERAPIES SERIES
Discharge: HOME | End: 2018-04-27
Payer: MEDICARE

## 2018-04-27 PROCEDURE — 97110 THERAPEUTIC EXERCISES: CPT

## 2018-04-27 PROCEDURE — 97116 GAIT TRAINING THERAPY: CPT

## 2018-04-30 ENCOUNTER — HOSPITAL ENCOUNTER (OUTPATIENT)
Dept: PHYSICAL THERAPY | Age: 83
Setting detail: THERAPIES SERIES
Discharge: HOME | End: 2018-04-30
Payer: MEDICARE

## 2018-04-30 PROCEDURE — 97110 THERAPEUTIC EXERCISES: CPT

## 2018-05-01 ENCOUNTER — OFFICE VISIT (OUTPATIENT)
Dept: FAMILY MEDICINE CLINIC | Age: 83
End: 2018-05-01
Payer: MEDICARE

## 2018-05-01 VITALS
BODY MASS INDEX: 32.14 KG/M2 | WEIGHT: 200 LBS | DIASTOLIC BLOOD PRESSURE: 50 MMHG | SYSTOLIC BLOOD PRESSURE: 94 MMHG | OXYGEN SATURATION: 98 % | HEART RATE: 74 BPM | HEIGHT: 66 IN | TEMPERATURE: 98.6 F | RESPIRATION RATE: 12 BRPM

## 2018-05-01 DIAGNOSIS — E66.09 CLASS 1 OBESITY DUE TO EXCESS CALORIES WITH SERIOUS COMORBIDITY AND BODY MASS INDEX (BMI) OF 32.0 TO 32.9 IN ADULT: ICD-10-CM

## 2018-05-01 DIAGNOSIS — E21.3 HYPERPARATHYROIDISM (HCC): ICD-10-CM

## 2018-05-01 DIAGNOSIS — E03.4 HYPOTHYROIDISM DUE TO ACQUIRED ATROPHY OF THYROID: ICD-10-CM

## 2018-05-01 DIAGNOSIS — E78.2 MIXED HYPERLIPIDEMIA: ICD-10-CM

## 2018-05-01 DIAGNOSIS — I10 ESSENTIAL HYPERTENSION: ICD-10-CM

## 2018-05-01 DIAGNOSIS — E11.9 TYPE 2 DIABETES MELLITUS WITHOUT COMPLICATION, WITHOUT LONG-TERM CURRENT USE OF INSULIN (HCC): Primary | ICD-10-CM

## 2018-05-01 DIAGNOSIS — E55.9 VITAMIN D DEFICIENCY: ICD-10-CM

## 2018-05-01 PROBLEM — S72.001A: Status: RESOLVED | Noted: 2017-12-29 | Resolved: 2018-05-01

## 2018-05-01 PROBLEM — I82.812 SAPHENOUS VEIN CLOT, LEFT: Status: RESOLVED | Noted: 2018-02-09 | Resolved: 2018-05-01

## 2018-05-01 PROBLEM — S52.502A CLOSED FRACTURE OF DISTAL END OF LEFT RADIUS: Status: RESOLVED | Noted: 2017-07-19 | Resolved: 2018-05-01

## 2018-05-01 PROBLEM — S72.001B: Status: RESOLVED | Noted: 2018-01-02 | Resolved: 2018-05-01

## 2018-05-01 PROCEDURE — 99214 OFFICE O/P EST MOD 30 MIN: CPT | Performed by: FAMILY MEDICINE

## 2018-05-01 RX ORDER — ERGOCALCIFEROL (VITAMIN D2) 1250 MCG
50000 CAPSULE ORAL WEEKLY
Qty: 12 CAPSULE | Refills: 0 | Status: SHIPPED | OUTPATIENT
Start: 2018-05-01 | End: 2018-01-01

## 2018-05-01 RX ORDER — CHOLECALCIFEROL (VITAMIN D3) 50 MCG
2000 TABLET ORAL DAILY
Qty: 30 TABLET | Refills: 11 | Status: SHIPPED | OUTPATIENT
Start: 2018-05-01

## 2018-05-01 ASSESSMENT — ENCOUNTER SYMPTOMS
RESPIRATORY NEGATIVE: 1
EYES NEGATIVE: 1
ALLERGIC/IMMUNOLOGIC NEGATIVE: 1
GASTROINTESTINAL NEGATIVE: 1

## 2018-05-02 ENCOUNTER — HOSPITAL ENCOUNTER (OUTPATIENT)
Dept: PHYSICAL THERAPY | Age: 83
Setting detail: THERAPIES SERIES
Discharge: HOME | End: 2018-05-02
Payer: MEDICARE

## 2018-05-02 PROCEDURE — 97110 THERAPEUTIC EXERCISES: CPT

## 2018-05-04 ENCOUNTER — HOSPITAL ENCOUNTER (OUTPATIENT)
Dept: PHYSICAL THERAPY | Age: 83
Setting detail: THERAPIES SERIES
Discharge: HOME | End: 2018-05-04
Payer: MEDICARE

## 2018-05-04 PROCEDURE — 97110 THERAPEUTIC EXERCISES: CPT

## 2018-05-07 ENCOUNTER — HOSPITAL ENCOUNTER (OUTPATIENT)
Dept: PHYSICAL THERAPY | Age: 83
Setting detail: THERAPIES SERIES
Discharge: HOME | End: 2018-05-07
Payer: MEDICARE

## 2018-05-07 PROCEDURE — 97110 THERAPEUTIC EXERCISES: CPT

## 2018-05-09 ENCOUNTER — HOSPITAL ENCOUNTER (OUTPATIENT)
Dept: PHYSICAL THERAPY | Age: 83
Setting detail: THERAPIES SERIES
Discharge: HOME | End: 2018-05-09
Payer: MEDICARE

## 2018-05-09 PROCEDURE — G8979 MOBILITY GOAL STATUS: HCPCS

## 2018-05-09 PROCEDURE — 97110 THERAPEUTIC EXERCISES: CPT

## 2018-05-09 PROCEDURE — G8980 MOBILITY D/C STATUS: HCPCS

## 2018-05-11 ENCOUNTER — APPOINTMENT (OUTPATIENT)
Dept: PHYSICAL THERAPY | Age: 83
End: 2018-05-11
Payer: MEDICARE

## 2018-12-05 PROBLEM — E21.0 HYPERPARATHYROIDISM, PRIMARY (HCC): Status: ACTIVE | Noted: 2018-05-01

## 2018-12-05 NOTE — PROGRESS NOTES
mucous membranes are normal.   Eyes: Pupils are equal, round, and reactive to light. Conjunctivae, EOM and lids are normal.   Neck: Trachea normal and normal range of motion. Neck supple. No JVD present. Carotid bruit is not present. No tracheal deviation present. No thyroid mass and no thyromegaly present. Cardiovascular: Normal rate, regular rhythm, S1 normal, S2 normal, normal heart sounds, intact distal pulses and normal pulses. Pulmonary/Chest: Effort normal and breath sounds normal. No respiratory distress. She has no decreased breath sounds. She has no wheezes. She has no rhonchi. She has no rales. She exhibits no tenderness and no deformity. Abdominal: Soft. Normal appearance and bowel sounds are normal. She exhibits no distension. There is no splenomegaly or hepatomegaly. There is no tenderness. There is no rigidity, no rebound, no guarding and no CVA tenderness. No hernia. Musculoskeletal:        Right knee: Normal.        Left knee: Normal.        Cervical back: Normal.        Thoracic back: Normal.        Lumbar back: Normal.   Diffuse arthritic deformities noted   Lymphadenopathy:     She has no cervical adenopathy. Neurological: She is alert. She has normal strength. No cranial nerve deficit or sensory deficit. Coordination and gait normal.   Skin: Skin is warm, dry and intact. No rash noted. No erythema. Psychiatric: She has a normal mood and affect. Her speech is normal. Judgment and thought content normal. Cognition and memory are normal.            Assessment & Plan    Diagnosis Orders   1. Type 2 diabetes mellitus without complication, without long-term current use of insulin ([facility])  Comprehensive Metabolic Panel    Hemoglobin A1C    Lipid Panel    Magnesium    Microalbumin / Creatinine Urine Ratio    TSH without Reflex   2.  Essential hypertension  CBC Auto Differential    Comprehensive Metabolic Panel    Lipid Panel    Magnesium    Microalbumin / Creatinine Urine Ratio    PTH, Intact TSH without Reflex   3. Vitamin D deficiency  ergocalciferol (ERGOCALCIFEROL) 12780 units capsule    Vitamin D 25 Hydrox, D2 & D3   4. Mixed hyperlipidemia  Comprehensive Metabolic Panel    Lipid Panel    TSH without Reflex   5. Hypothyroidism due to acquired atrophy of thyroid  levothyroxine (SYNTHROID) 50 MCG tablet    Comprehensive Metabolic Panel    TSH without Reflex    T4, Free   6. Hyperparathyroidism, primary ([address]  Comprehensive Metabolic Panel    PTH, Intact    Vitamin D 25 Hydrox, D2 & D3   7. Needs flu shot  Influenza, High Dose, 65 yrs  and older, IM, PF, Prefill Syr, 0.5mL (FLUZONE HD)   8.  Cough  promethazine-codeine (PHENERGAN WITH CODEINE) 6.25-10 MG/5ML syrup     Orders Placed This Encounter   Procedures    Influenza, High Dose, 65 yrs  and older, IM, PF, Prefill Syr, 0.5mL (FLUZONE HD)    CBC Auto Differential     Standing Status:   Future     Standing Expiration Date:   12/5/2019    Comprehensive Metabolic Panel     Standing Status:   Future     Standing Expiration Date:   12/5/2019    Hemoglobin A1C     Standing Status:   Future     Standing Expiration Date:   12/5/2019    Lipid Panel     Standing Status:   Future     Standing Expiration Date:   12/5/2019     Order Specific Question:   Is Patient Fasting?/# of Hours     Answer:   8    Magnesium     Standing Status:   Future     Standing Expiration Date:   12/5/2019    Microalbumin / Creatinine Urine Ratio     Standing Status:   Future     Standing Expiration Date:   12/5/2019    PTH, Intact     Standing Status:   Future     Standing Expiration Date:   12/5/2019    TSH without Reflex     Standing Status:   Future     Standing Expiration Date:   12/5/2019    T4, Free     Standing Status:   Future     Standing Expiration Date:   12/5/2019    Vitamin D 25 Hydrox, D2 & D3     Standing Status:   Future     Standing Expiration Date:   12/5/2019     Orders Placed This Encounter   Medications    levothyroxine (SYNTHROID) 50 MCG tablet

## 2019-01-01 ENCOUNTER — APPOINTMENT (OUTPATIENT)
Dept: MRI IMAGING | Age: 84
DRG: 064 | End: 2019-01-01
Payer: MEDICARE

## 2019-01-01 ENCOUNTER — APPOINTMENT (OUTPATIENT)
Dept: GENERAL RADIOLOGY | Age: 84
DRG: 064 | End: 2019-01-01
Payer: MEDICARE

## 2019-01-01 ENCOUNTER — TELEPHONE (OUTPATIENT)
Dept: FAMILY MEDICINE CLINIC | Age: 84
End: 2019-01-01

## 2019-01-01 ENCOUNTER — APPOINTMENT (OUTPATIENT)
Dept: CT IMAGING | Age: 84
DRG: 064 | End: 2019-01-01
Payer: MEDICARE

## 2019-01-01 ENCOUNTER — HOSPITAL ENCOUNTER (INPATIENT)
Age: 84
LOS: 1 days | Discharge: HOSPICE | DRG: 064 | End: 2019-05-18
Attending: EMERGENCY MEDICINE | Admitting: INTERNAL MEDICINE
Payer: MEDICARE

## 2019-01-01 ENCOUNTER — OFFICE VISIT (OUTPATIENT)
Dept: FAMILY MEDICINE CLINIC | Age: 84
End: 2019-01-01
Payer: MEDICARE

## 2019-01-01 VITALS
BODY MASS INDEX: 32.14 KG/M2 | TEMPERATURE: 97.8 F | RESPIRATION RATE: 14 BRPM | WEIGHT: 200 LBS | DIASTOLIC BLOOD PRESSURE: 70 MMHG | OXYGEN SATURATION: 94 % | SYSTOLIC BLOOD PRESSURE: 112 MMHG | HEIGHT: 66 IN | HEART RATE: 70 BPM

## 2019-01-01 VITALS
SYSTOLIC BLOOD PRESSURE: 132 MMHG | WEIGHT: 194.91 LBS | BODY MASS INDEX: 33.28 KG/M2 | RESPIRATION RATE: 20 BRPM | HEART RATE: 60 BPM | OXYGEN SATURATION: 98 % | DIASTOLIC BLOOD PRESSURE: 63 MMHG | HEIGHT: 64 IN | TEMPERATURE: 97.4 F

## 2019-01-01 DIAGNOSIS — R05.9 COUGH: ICD-10-CM

## 2019-01-01 DIAGNOSIS — J20.9 ACUTE BRONCHITIS, UNSPECIFIED ORGANISM: Primary | ICD-10-CM

## 2019-01-01 DIAGNOSIS — I63.9 ACUTE ISCHEMIC STROKE (HCC): Primary | ICD-10-CM

## 2019-01-01 LAB
ALBUMIN SERPL-MCNC: 3.8 G/DL (ref 3.5–4.6)
ALP BLD-CCNC: 105 U/L (ref 40–130)
ALT SERPL-CCNC: 14 U/L (ref 0–33)
ANION GAP SERPL CALCULATED.3IONS-SCNC: 13 MEQ/L (ref 9–15)
ANION GAP SERPL CALCULATED.3IONS-SCNC: 15 MEQ/L (ref 9–15)
ANION GAP SERPL CALCULATED.3IONS-SCNC: 18 MEQ/L (ref 9–15)
AST SERPL-CCNC: 19 U/L (ref 0–35)
BACTERIA: NEGATIVE /HPF
BASE EXCESS ARTERIAL: -6 (ref -3–3)
BASE EXCESS ARTERIAL: 0 (ref -3–3)
BASOPHILS ABSOLUTE: 0 K/UL (ref 0–0.2)
BASOPHILS RELATIVE PERCENT: 0.1 %
BASOPHILS RELATIVE PERCENT: 0.2 %
BASOPHILS RELATIVE PERCENT: 0.6 %
BILIRUB SERPL-MCNC: 0.7 MG/DL (ref 0.2–0.7)
BILIRUBIN URINE: NEGATIVE
BLOOD, URINE: NEGATIVE
BUN BLDV-MCNC: 18 MG/DL (ref 8–23)
BUN BLDV-MCNC: 19 MG/DL (ref 8–23)
BUN BLDV-MCNC: 19 MG/DL (ref 8–23)
CALCIUM IONIZED: 1.46 MMOL/L (ref 1.12–1.32)
CALCIUM SERPL-MCNC: 10.5 MG/DL (ref 8.5–9.9)
CALCIUM SERPL-MCNC: 10.7 MG/DL (ref 8.5–9.9)
CALCIUM SERPL-MCNC: 9.5 MG/DL (ref 8.5–9.9)
CHLORIDE BLD-SCNC: 101 MEQ/L (ref 95–107)
CHLORIDE BLD-SCNC: 101 MEQ/L (ref 95–107)
CHLORIDE BLD-SCNC: 107 MEQ/L (ref 95–107)
CHOLESTEROL, TOTAL: 198 MG/DL (ref 0–199)
CLARITY: CLEAR
CO2: 17 MEQ/L (ref 20–31)
CO2: 21 MEQ/L (ref 20–31)
CO2: 21 MEQ/L (ref 20–31)
COLOR: YELLOW
CREAT SERPL-MCNC: 0.79 MG/DL (ref 0.5–0.9)
CREAT SERPL-MCNC: 0.84 MG/DL (ref 0.5–0.9)
CREAT SERPL-MCNC: 0.87 MG/DL (ref 0.5–0.9)
EKG ATRIAL RATE: 94 BPM
EKG P AXIS: 28 DEGREES
EKG P-R INTERVAL: 204 MS
EKG Q-T INTERVAL: 378 MS
EKG QRS DURATION: 90 MS
EKG QTC CALCULATION (BAZETT): 472 MS
EKG R AXIS: -38 DEGREES
EKG T AXIS: 63 DEGREES
EKG VENTRICULAR RATE: 94 BPM
EOSINOPHILS ABSOLUTE: 0 K/UL (ref 0–0.7)
EOSINOPHILS RELATIVE PERCENT: 0 %
EOSINOPHILS RELATIVE PERCENT: 0 %
EOSINOPHILS RELATIVE PERCENT: 0.2 %
EPITHELIAL CELLS, UA: NORMAL /HPF (ref 0–5)
GFR AFRICAN AMERICAN: >60
GFR NON-AFRICAN AMERICAN: >60
GLOBULIN: 3.3 G/DL (ref 2.3–3.5)
GLUCOSE BLD-MCNC: 137 MG/DL (ref 60–115)
GLUCOSE BLD-MCNC: 150 MG/DL (ref 60–115)
GLUCOSE BLD-MCNC: 168 MG/DL (ref 60–115)
GLUCOSE BLD-MCNC: 178 MG/DL (ref 60–115)
GLUCOSE BLD-MCNC: 179 MG/DL (ref 70–99)
GLUCOSE BLD-MCNC: 229 MG/DL (ref 70–99)
GLUCOSE BLD-MCNC: 250 MG/DL (ref 60–115)
GLUCOSE BLD-MCNC: 270 MG/DL (ref 70–99)
GLUCOSE BLD-MCNC: 283 MG/DL (ref 60–115)
GLUCOSE URINE: 500 MG/DL
HBA1C MFR BLD: 7.2 % (ref 4.8–5.9)
HCO3 ARTERIAL: 18.8 MMOL/L (ref 21–29)
HCO3 ARTERIAL: 23.3 MMOL/L (ref 21–29)
HCT VFR BLD CALC: 29.2 % (ref 37–47)
HCT VFR BLD CALC: 31.1 % (ref 37–47)
HCT VFR BLD CALC: 34.2 % (ref 37–47)
HCT VFR BLD CALC: 34.3 % (ref 37–47)
HCT VFR BLD CALC: 36.7 % (ref 37–47)
HCT VFR BLD CALC: 39.7 % (ref 37–47)
HDLC SERPL-MCNC: 55 MG/DL (ref 40–59)
HEMOGLOBIN: 10.3 G/DL (ref 12–16)
HEMOGLOBIN: 11.2 G/DL (ref 12–16)
HEMOGLOBIN: 11.3 G/DL (ref 12–16)
HEMOGLOBIN: 11.7 G/DL (ref 12–16)
HEMOGLOBIN: 12.8 G/DL (ref 12–16)
HEMOGLOBIN: 12.9 GM/DL (ref 12–16)
HEMOGLOBIN: 9.7 G/DL (ref 12–16)
HYALINE CASTS: NORMAL /HPF (ref 0–5)
INR BLD: 1.2
KETONES, URINE: ABNORMAL MG/DL
LACTATE: 1.69 MMOL/L (ref 0.4–2)
LACTATE: 4.02 MMOL/L (ref 0.4–2)
LACTIC ACID: 1.1 MMOL/L (ref 0.5–2.2)
LACTIC ACID: 1.6 MMOL/L (ref 0.5–2.2)
LACTIC ACID: 3.4 MMOL/L (ref 0.5–2.2)
LDL CHOLESTEROL CALCULATED: 126 MG/DL (ref 0–129)
LEUKOCYTE ESTERASE, URINE: NEGATIVE
LV EF: 50 %
LVEF MODALITY: NORMAL
LYMPHOCYTES ABSOLUTE: 0.4 K/UL (ref 1–4.8)
LYMPHOCYTES ABSOLUTE: 0.5 K/UL (ref 1–4.8)
LYMPHOCYTES ABSOLUTE: 0.8 K/UL (ref 1–4.8)
LYMPHOCYTES RELATIVE PERCENT: 3.2 %
LYMPHOCYTES RELATIVE PERCENT: 6.5 %
LYMPHOCYTES RELATIVE PERCENT: 6.5 %
MAGNESIUM: 2 MG/DL (ref 1.7–2.4)
MCH RBC QN AUTO: 28 PG (ref 27–31.3)
MCH RBC QN AUTO: 28.8 PG (ref 27–31.3)
MCH RBC QN AUTO: 28.9 PG (ref 27–31.3)
MCHC RBC AUTO-ENTMCNC: 32 % (ref 33–37)
MCHC RBC AUTO-ENTMCNC: 32.3 % (ref 33–37)
MCHC RBC AUTO-ENTMCNC: 33.3 % (ref 33–37)
MCV RBC AUTO: 86.9 FL (ref 82–100)
MCV RBC AUTO: 87.5 FL (ref 82–100)
MCV RBC AUTO: 89.2 FL (ref 82–100)
MONOCYTES ABSOLUTE: 0.7 K/UL (ref 0.2–0.8)
MONOCYTES ABSOLUTE: 0.9 K/UL (ref 0.2–0.8)
MONOCYTES ABSOLUTE: 1.2 K/UL (ref 0.2–0.8)
MONOCYTES RELATIVE PERCENT: 10.9 %
MONOCYTES RELATIVE PERCENT: 6.2 %
MONOCYTES RELATIVE PERCENT: 9.9 %
NEUTROPHILS ABSOLUTE: 10.4 K/UL (ref 1.4–6.5)
NEUTROPHILS ABSOLUTE: 10.5 K/UL (ref 1.4–6.5)
NEUTROPHILS ABSOLUTE: 6.7 K/UL (ref 1.4–6.5)
NEUTROPHILS RELATIVE PERCENT: 81.8 %
NEUTROPHILS RELATIVE PERCENT: 83.4 %
NEUTROPHILS RELATIVE PERCENT: 90.5 %
NITRITE, URINE: NEGATIVE
O2 SAT, ARTERIAL: 100 % (ref 93–100)
O2 SAT, ARTERIAL: 100 % (ref 93–100)
OCCULT BLOOD, OTHER: POSITIVE
PCO2 ARTERIAL: 31 MM HG (ref 35–45)
PCO2 ARTERIAL: 32 MM HG (ref 35–45)
PDW BLD-RTO: 15.1 % (ref 11.5–14.5)
PDW BLD-RTO: 15.3 % (ref 11.5–14.5)
PDW BLD-RTO: 15.3 % (ref 11.5–14.5)
PERFORMED ON: ABNORMAL
PH ARTERIAL: 7.38 (ref 7.35–7.45)
PH ARTERIAL: 7.49 (ref 7.35–7.45)
PH UA: 5.5 (ref 5–9)
PLATELET # BLD: 334 K/UL (ref 130–400)
PLATELET # BLD: 477 K/UL (ref 130–400)
PLATELET # BLD: 495 K/UL (ref 130–400)
PO2 ARTERIAL: 212 MM HG (ref 75–108)
PO2 ARTERIAL: 294 MM HG (ref 75–108)
POC CHLORIDE: 108 MEQ/L (ref 99–110)
POC CREATININE: 0.6 MG/DL (ref 0.6–1.2)
POC FIO2: 50
POC FIO2: 70
POC HEMATOCRIT: 38 % (ref 36–48)
POC POTASSIUM: 3.9 MEQ/L (ref 3.5–5.1)
POC SAMPLE TYPE: ABNORMAL
POC SAMPLE TYPE: ABNORMAL
POC SODIUM: 138 MEQ/L (ref 136–145)
POTASSIUM REFLEX MAGNESIUM: 3.3 MEQ/L (ref 3.4–4.9)
POTASSIUM REFLEX MAGNESIUM: 4.3 MEQ/L (ref 3.4–4.9)
POTASSIUM SERPL-SCNC: 4 MEQ/L (ref 3.4–4.9)
PROTEIN UA: 30 MG/DL
PROTHROMBIN TIME: 11.9 SEC (ref 9–11.5)
RBC # BLD: 3.36 M/UL (ref 4.2–5.4)
RBC # BLD: 4.19 M/UL (ref 4.2–5.4)
RBC # BLD: 4.45 M/UL (ref 4.2–5.4)
RBC UA: NORMAL /HPF (ref 0–5)
REASON FOR REJECTION: NORMAL
REJECTED TEST: NORMAL
SODIUM BLD-SCNC: 136 MEQ/L (ref 135–144)
SODIUM BLD-SCNC: 137 MEQ/L (ref 135–144)
SODIUM BLD-SCNC: 141 MEQ/L (ref 135–144)
SPECIFIC GRAVITY UA: 1.02 (ref 1–1.03)
TCO2 ARTERIAL: 20 (ref 22–29)
TCO2 ARTERIAL: 24 (ref 22–29)
TOTAL CK: 130 U/L (ref 0–170)
TOTAL PROTEIN: 7.1 G/DL (ref 6.3–8)
TRIGL SERPL-MCNC: 86 MG/DL (ref 0–150)
TROPONIN: <0.01 NG/ML (ref 0–0.01)
TROPONIN: <0.01 NG/ML (ref 0–0.01)
URINE CULTURE, ROUTINE: NORMAL
URINE REFLEX TO CULTURE: YES
UROBILINOGEN, URINE: 1 E.U./DL
WBC # BLD: 11.6 K/UL (ref 4.8–10.8)
WBC # BLD: 12.5 K/UL (ref 4.8–10.8)
WBC # BLD: 8.2 K/UL (ref 4.8–10.8)
WBC UA: NORMAL /HPF (ref 0–5)

## 2019-01-01 PROCEDURE — 82271 OCCULT BLOOD OTHER SOURCES: CPT

## 2019-01-01 PROCEDURE — 94799 UNLISTED PULMONARY SVC/PX: CPT

## 2019-01-01 PROCEDURE — 83036 HEMOGLOBIN GLYCOSYLATED A1C: CPT

## 2019-01-01 PROCEDURE — 93005 ELECTROCARDIOGRAM TRACING: CPT

## 2019-01-01 PROCEDURE — 71045 X-RAY EXAM CHEST 1 VIEW: CPT

## 2019-01-01 PROCEDURE — 83605 ASSAY OF LACTIC ACID: CPT

## 2019-01-01 PROCEDURE — 6360000002 HC RX W HCPCS: Performed by: HOSPITALIST

## 2019-01-01 PROCEDURE — C9113 INJ PANTOPRAZOLE SODIUM, VIA: HCPCS | Performed by: HOSPITALIST

## 2019-01-01 PROCEDURE — 82330 ASSAY OF CALCIUM: CPT

## 2019-01-01 PROCEDURE — 82948 REAGENT STRIP/BLOOD GLUCOSE: CPT

## 2019-01-01 PROCEDURE — 6370000000 HC RX 637 (ALT 250 FOR IP): Performed by: INTERNAL MEDICINE

## 2019-01-01 PROCEDURE — 36600 WITHDRAWAL OF ARTERIAL BLOOD: CPT

## 2019-01-01 PROCEDURE — 85025 COMPLETE CBC W/AUTO DIFF WBC: CPT

## 2019-01-01 PROCEDURE — 87086 URINE CULTURE/COLONY COUNT: CPT

## 2019-01-01 PROCEDURE — 93306 TTE W/DOPPLER COMPLETE: CPT

## 2019-01-01 PROCEDURE — 82803 BLOOD GASES ANY COMBINATION: CPT

## 2019-01-01 PROCEDURE — 5A1935Z RESPIRATORY VENTILATION, LESS THAN 24 CONSECUTIVE HOURS: ICD-10-PCS | Performed by: INTERNAL MEDICINE

## 2019-01-01 PROCEDURE — 99221 1ST HOSP IP/OBS SF/LOW 40: CPT | Performed by: SPECIALIST

## 2019-01-01 PROCEDURE — 2700000000 HC OXYGEN THERAPY PER DAY

## 2019-01-01 PROCEDURE — 93010 ELECTROCARDIOGRAM REPORT: CPT | Performed by: INTERNAL MEDICINE

## 2019-01-01 PROCEDURE — 2000000000 HC ICU R&B

## 2019-01-01 PROCEDURE — 82565 ASSAY OF CREATININE: CPT

## 2019-01-01 PROCEDURE — 95816 EEG AWAKE AND DROWSY: CPT

## 2019-01-01 PROCEDURE — 6370000000 HC RX 637 (ALT 250 FOR IP): Performed by: HOSPITALIST

## 2019-01-01 PROCEDURE — 85610 PROTHROMBIN TIME: CPT

## 2019-01-01 PROCEDURE — 85014 HEMATOCRIT: CPT

## 2019-01-01 PROCEDURE — 99285 EMERGENCY DEPT VISIT HI MDM: CPT

## 2019-01-01 PROCEDURE — 51702 INSERT TEMP BLADDER CATH: CPT

## 2019-01-01 PROCEDURE — 80053 COMPREHEN METABOLIC PANEL: CPT

## 2019-01-01 PROCEDURE — 6360000002 HC RX W HCPCS: Performed by: INTERNAL MEDICINE

## 2019-01-01 PROCEDURE — 94002 VENT MGMT INPAT INIT DAY: CPT

## 2019-01-01 PROCEDURE — 96374 THER/PROPH/DIAG INJ IV PUSH: CPT

## 2019-01-01 PROCEDURE — 99291 CRITICAL CARE FIRST HOUR: CPT | Performed by: INTERNAL MEDICINE

## 2019-01-01 PROCEDURE — 6360000002 HC RX W HCPCS

## 2019-01-01 PROCEDURE — 99213 OFFICE O/P EST LOW 20 MIN: CPT | Performed by: NURSE PRACTITIONER

## 2019-01-01 PROCEDURE — 82435 ASSAY OF BLOOD CHLORIDE: CPT

## 2019-01-01 PROCEDURE — 84295 ASSAY OF SERUM SODIUM: CPT

## 2019-01-01 PROCEDURE — 2500000003 HC RX 250 WO HCPCS: Performed by: EMERGENCY MEDICINE

## 2019-01-01 PROCEDURE — 36415 COLL VENOUS BLD VENIPUNCTURE: CPT

## 2019-01-01 PROCEDURE — 6360000002 HC RX W HCPCS: Performed by: EMERGENCY MEDICINE

## 2019-01-01 PROCEDURE — 80048 BASIC METABOLIC PNL TOTAL CA: CPT

## 2019-01-01 PROCEDURE — 85018 HEMOGLOBIN: CPT

## 2019-01-01 PROCEDURE — 82550 ASSAY OF CK (CPK): CPT

## 2019-01-01 PROCEDURE — 70450 CT HEAD/BRAIN W/O DYE: CPT

## 2019-01-01 PROCEDURE — 2580000003 HC RX 258: Performed by: HOSPITALIST

## 2019-01-01 PROCEDURE — 84132 ASSAY OF SERUM POTASSIUM: CPT

## 2019-01-01 PROCEDURE — 87040 BLOOD CULTURE FOR BACTERIA: CPT

## 2019-01-01 PROCEDURE — 70547 MR ANGIOGRAPHY NECK W/O DYE: CPT

## 2019-01-01 PROCEDURE — G8510 SCR DEP NEG, NO PLAN REQD: HCPCS | Performed by: NURSE PRACTITIONER

## 2019-01-01 PROCEDURE — 83735 ASSAY OF MAGNESIUM: CPT

## 2019-01-01 PROCEDURE — 0BH17EZ INSERTION OF ENDOTRACHEAL AIRWAY INTO TRACHEA, VIA NATURAL OR ARTIFICIAL OPENING: ICD-10-PCS | Performed by: EMERGENCY MEDICINE

## 2019-01-01 PROCEDURE — 94003 VENT MGMT INPAT SUBQ DAY: CPT

## 2019-01-01 PROCEDURE — 81001 URINALYSIS AUTO W/SCOPE: CPT

## 2019-01-01 PROCEDURE — 80061 LIPID PANEL: CPT

## 2019-01-01 PROCEDURE — 84484 ASSAY OF TROPONIN QUANT: CPT

## 2019-01-01 PROCEDURE — 70551 MRI BRAIN STEM W/O DYE: CPT

## 2019-01-01 PROCEDURE — 70544 MR ANGIOGRAPHY HEAD W/O DYE: CPT

## 2019-01-01 PROCEDURE — 99223 1ST HOSP IP/OBS HIGH 75: CPT | Performed by: INTERNAL MEDICINE

## 2019-01-01 RX ORDER — ONDANSETRON 2 MG/ML
4 INJECTION INTRAMUSCULAR; INTRAVENOUS EVERY 6 HOURS PRN
Status: CANCELLED | OUTPATIENT
Start: 2019-01-01

## 2019-01-01 RX ORDER — PROPOFOL 10 MG/ML
20 INJECTION, EMULSION INTRAVENOUS ONCE
Status: COMPLETED | OUTPATIENT
Start: 2019-01-01 | End: 2019-01-01

## 2019-01-01 RX ORDER — CHLORHEXIDINE GLUCONATE 0.12 MG/ML
15 RINSE ORAL 2 TIMES DAILY
Status: CANCELLED | OUTPATIENT
Start: 2019-01-01

## 2019-01-01 RX ORDER — DEXTROSE MONOHYDRATE 50 MG/ML
100 INJECTION, SOLUTION INTRAVENOUS PRN
Status: DISCONTINUED | OUTPATIENT
Start: 2019-01-01 | End: 2019-01-01 | Stop reason: HOSPADM

## 2019-01-01 RX ORDER — ONDANSETRON 2 MG/ML
4 INJECTION INTRAMUSCULAR; INTRAVENOUS EVERY 6 HOURS PRN
Status: DISCONTINUED | OUTPATIENT
Start: 2019-01-01 | End: 2019-01-01 | Stop reason: HOSPADM

## 2019-01-01 RX ORDER — NICOTINE POLACRILEX 4 MG
15 LOZENGE BUCCAL PRN
Status: DISCONTINUED | OUTPATIENT
Start: 2019-01-01 | End: 2019-01-01 | Stop reason: HOSPADM

## 2019-01-01 RX ORDER — SODIUM CHLORIDE 9 MG/ML
INJECTION, SOLUTION INTRAVENOUS CONTINUOUS
Status: DISCONTINUED | OUTPATIENT
Start: 2019-01-01 | End: 2019-01-01 | Stop reason: HOSPADM

## 2019-01-01 RX ORDER — BISACODYL 10 MG
10 SUPPOSITORY, RECTAL RECTAL DAILY PRN
Status: CANCELLED | OUTPATIENT
Start: 2019-01-01

## 2019-01-01 RX ORDER — MORPHINE SULFATE 4 MG/ML
4 INJECTION, SOLUTION INTRAMUSCULAR; INTRAVENOUS
Status: DISCONTINUED | OUTPATIENT
Start: 2019-01-01 | End: 2019-01-01 | Stop reason: HOSPADM

## 2019-01-01 RX ORDER — ETOMIDATE 2 MG/ML
16 INJECTION INTRAVENOUS ONCE
Status: COMPLETED | OUTPATIENT
Start: 2019-01-01 | End: 2019-01-01

## 2019-01-01 RX ORDER — BENZONATATE 100 MG/1
100 CAPSULE ORAL 3 TIMES DAILY PRN
Qty: 20 CAPSULE | Refills: 0 | Status: SHIPPED | OUTPATIENT
Start: 2019-01-01 | End: 2019-01-01

## 2019-01-01 RX ORDER — SODIUM CHLORIDE 0.9 % (FLUSH) 0.9 %
10 SYRINGE (ML) INJECTION PRN
Status: DISCONTINUED | OUTPATIENT
Start: 2019-01-01 | End: 2019-01-01 | Stop reason: HOSPADM

## 2019-01-01 RX ORDER — PANTOPRAZOLE SODIUM 40 MG/10ML
40 INJECTION, POWDER, LYOPHILIZED, FOR SOLUTION INTRAVENOUS DAILY
Status: DISCONTINUED | OUTPATIENT
Start: 2019-01-01 | End: 2019-01-01 | Stop reason: HOSPADM

## 2019-01-01 RX ORDER — SODIUM CHLORIDE 0.9 % (FLUSH) 0.9 %
10 SYRINGE (ML) INJECTION PRN
Status: CANCELLED | OUTPATIENT
Start: 2019-01-01

## 2019-01-01 RX ORDER — CHLORHEXIDINE GLUCONATE 0.12 MG/ML
15 RINSE ORAL 2 TIMES DAILY
Status: DISCONTINUED | OUTPATIENT
Start: 2019-01-01 | End: 2019-01-01 | Stop reason: HOSPADM

## 2019-01-01 RX ORDER — PROMETHAZINE HYDROCHLORIDE AND CODEINE PHOSPHATE 6.25; 1 MG/5ML; MG/5ML
5 SYRUP ORAL 4 TIMES DAILY PRN
Qty: 140 ML | Refills: 0 | Status: SHIPPED | OUTPATIENT
Start: 2019-01-01 | End: 2019-01-01

## 2019-01-01 RX ORDER — PROPOFOL 10 MG/ML
10 INJECTION, EMULSION INTRAVENOUS
Status: DISCONTINUED | OUTPATIENT
Start: 2019-01-01 | End: 2019-01-01

## 2019-01-01 RX ORDER — DEXTROSE MONOHYDRATE 25 G/50ML
12.5 INJECTION, SOLUTION INTRAVENOUS PRN
Status: DISCONTINUED | OUTPATIENT
Start: 2019-01-01 | End: 2019-01-01 | Stop reason: HOSPADM

## 2019-01-01 RX ORDER — LORAZEPAM 2 MG/ML
2 INJECTION INTRAMUSCULAR ONCE
Status: COMPLETED | OUTPATIENT
Start: 2019-01-01 | End: 2019-01-01

## 2019-01-01 RX ORDER — SODIUM CHLORIDE 0.9 % (FLUSH) 0.9 %
10 SYRINGE (ML) INJECTION EVERY 12 HOURS SCHEDULED
Status: CANCELLED | OUTPATIENT
Start: 2019-01-01

## 2019-01-01 RX ORDER — ACETAMINOPHEN 650 MG/1
650 SUPPOSITORY RECTAL EVERY 4 HOURS PRN
Status: DISCONTINUED | OUTPATIENT
Start: 2019-01-01 | End: 2019-01-01 | Stop reason: HOSPADM

## 2019-01-01 RX ORDER — 0.9 % SODIUM CHLORIDE 0.9 %
10 VIAL (ML) INJECTION DAILY
Status: DISCONTINUED | OUTPATIENT
Start: 2019-01-01 | End: 2019-01-01 | Stop reason: HOSPADM

## 2019-01-01 RX ORDER — ACETAMINOPHEN 650 MG/1
650 SUPPOSITORY RECTAL EVERY 4 HOURS PRN
Status: CANCELLED | OUTPATIENT
Start: 2019-01-01

## 2019-01-01 RX ORDER — SODIUM CHLORIDE 0.9 % (FLUSH) 0.9 %
10 SYRINGE (ML) INJECTION EVERY 12 HOURS SCHEDULED
Status: DISCONTINUED | OUTPATIENT
Start: 2019-01-01 | End: 2019-01-01 | Stop reason: HOSPADM

## 2019-01-01 RX ORDER — MORPHINE SULFATE 4 MG/ML
4 INJECTION, SOLUTION INTRAMUSCULAR; INTRAVENOUS
Status: CANCELLED | OUTPATIENT
Start: 2019-01-01

## 2019-01-01 RX ORDER — DOXYCYCLINE HYCLATE 100 MG
100 TABLET ORAL 2 TIMES DAILY
Qty: 14 TABLET | Refills: 0 | Status: SHIPPED | OUTPATIENT
Start: 2019-01-01 | End: 2019-01-01

## 2019-01-01 RX ORDER — PROPOFOL 10 MG/ML
INJECTION, EMULSION INTRAVENOUS
Status: COMPLETED
Start: 2019-01-01 | End: 2019-01-01

## 2019-01-01 RX ORDER — BISACODYL 10 MG
10 SUPPOSITORY, RECTAL RECTAL DAILY PRN
Status: DISCONTINUED | OUTPATIENT
Start: 2019-01-01 | End: 2019-01-01 | Stop reason: HOSPADM

## 2019-01-01 RX ADMIN — Medication 10 ML: at 11:41

## 2019-01-01 RX ADMIN — ETOMIDATE 16 MG: 2 INJECTION, SOLUTION INTRAVENOUS at 23:27

## 2019-01-01 RX ADMIN — SODIUM CHLORIDE: 9 INJECTION, SOLUTION INTRAVENOUS at 02:49

## 2019-01-01 RX ADMIN — CHLORHEXIDINE GLUCONATE 0.12% ORAL RINSE 15 ML: 1.2 LIQUID ORAL at 11:52

## 2019-01-01 RX ADMIN — CHLORHEXIDINE GLUCONATE 0.12% ORAL RINSE 15 ML: 1.2 LIQUID ORAL at 21:37

## 2019-01-01 RX ADMIN — SODIUM CHLORIDE: 9 INJECTION, SOLUTION INTRAVENOUS at 21:37

## 2019-01-01 RX ADMIN — CHLORHEXIDINE GLUCONATE 0.12% ORAL RINSE 15 ML: 1.2 LIQUID ORAL at 09:21

## 2019-01-01 RX ADMIN — PANTOPRAZOLE SODIUM 40 MG: 40 INJECTION, POWDER, LYOPHILIZED, FOR SOLUTION INTRAVENOUS at 11:40

## 2019-01-01 RX ADMIN — PROPOFOL 20 MCG/KG/MIN: 10 INJECTION, EMULSION INTRAVENOUS at 23:41

## 2019-01-01 RX ADMIN — SODIUM CHLORIDE 125 ML/HR: 9 INJECTION, SOLUTION INTRAVENOUS at 07:23

## 2019-01-01 RX ADMIN — SODIUM CHLORIDE: 9 INJECTION, SOLUTION INTRAVENOUS at 11:40

## 2019-01-01 RX ADMIN — PANTOPRAZOLE SODIUM 40 MG: 40 INJECTION, POWDER, LYOPHILIZED, FOR SOLUTION INTRAVENOUS at 09:21

## 2019-01-01 RX ADMIN — INSULIN LISPRO 2 UNITS: 100 INJECTION, SOLUTION INTRAVENOUS; SUBCUTANEOUS at 12:04

## 2019-01-01 RX ADMIN — LORAZEPAM 2 MG: 2 INJECTION, SOLUTION INTRAMUSCULAR; INTRAVENOUS at 23:30

## 2019-01-01 RX ADMIN — MORPHINE SULFATE 4 MG: 4 INJECTION INTRAVENOUS at 13:50

## 2019-01-01 RX ADMIN — MORPHINE SULFATE 4 MG: 4 INJECTION INTRAVENOUS at 17:08

## 2019-01-01 RX ADMIN — PROPOFOL 1000 MG: 10 INJECTION, EMULSION INTRAVENOUS at 23:32

## 2019-01-01 RX ADMIN — INSULIN LISPRO 2 UNITS: 100 INJECTION, SOLUTION INTRAVENOUS; SUBCUTANEOUS at 06:19

## 2019-01-01 RX ADMIN — INSULIN LISPRO 2 UNITS: 100 INJECTION, SOLUTION INTRAVENOUS; SUBCUTANEOUS at 18:26

## 2019-01-01 RX ADMIN — Medication 10 ML: at 09:28

## 2019-01-01 ASSESSMENT — PULMONARY FUNCTION TESTS
PIF_VALUE: 15
PIF_VALUE: 16
PIF_VALUE: 14
PIF_VALUE: 16
PIF_VALUE: 15
PIF_VALUE: 17
PIF_VALUE: 10
PIF_VALUE: 17
PIF_VALUE: 19
PIF_VALUE: 19
PIF_VALUE: 12
PIF_VALUE: 17
PIF_VALUE: 10
PIF_VALUE: 15
PIF_VALUE: 15
PIF_VALUE: 13
PIF_VALUE: 17
PIF_VALUE: 12
PIF_VALUE: 16
PIF_VALUE: 17
PIF_VALUE: 19
PIF_VALUE: 18
PIF_VALUE: 16
PIF_VALUE: 19
PIF_VALUE: 18
PIF_VALUE: 17
PIF_VALUE: 11
PIF_VALUE: 15
PIF_VALUE: 17
PIF_VALUE: 19
PIF_VALUE: 18
PIF_VALUE: 13
PIF_VALUE: 16
PIF_VALUE: 14
PIF_VALUE: 17
PIF_VALUE: 17
PIF_VALUE: 13
PIF_VALUE: 17
PIF_VALUE: 18
PIF_VALUE: 16
PIF_VALUE: 19
PIF_VALUE: 16
PIF_VALUE: 13
PIF_VALUE: 19
PIF_VALUE: 18
PIF_VALUE: 19
PIF_VALUE: 13
PIF_VALUE: 19
PIF_VALUE: 11
PIF_VALUE: 13
PIF_VALUE: 21
PIF_VALUE: 18
PIF_VALUE: 19
PIF_VALUE: 19
PIF_VALUE: 14
PIF_VALUE: 10
PIF_VALUE: 19
PIF_VALUE: 19

## 2019-01-01 ASSESSMENT — PAIN SCALES - GENERAL
PAINLEVEL_OUTOF10: 0
PAINLEVEL_OUTOF10: 5
PAINLEVEL_OUTOF10: 0

## 2019-01-01 ASSESSMENT — ENCOUNTER SYMPTOMS
SINUS PRESSURE: 1
SHORTNESS OF BREATH: 0
COUGH: 1
WHEEZING: 0
RHINORRHEA: 1
SINUS PAIN: 1
SORE THROAT: 0

## 2019-01-01 ASSESSMENT — PATIENT HEALTH QUESTIONNAIRE - PHQ9
SUM OF ALL RESPONSES TO PHQ QUESTIONS 1-9: 0
SUM OF ALL RESPONSES TO PHQ9 QUESTIONS 1 & 2: 0
SUM OF ALL RESPONSES TO PHQ QUESTIONS 1-9: 0
1. LITTLE INTEREST OR PLEASURE IN DOING THINGS: 0
2. FEELING DOWN, DEPRESSED OR HOPELESS: 0

## 2019-04-26 NOTE — PATIENT INSTRUCTIONS
Patient Education        Bronchitis: Care Instructions  Your Care Instructions    Bronchitis is inflammation of the bronchial tubes, which carry air to the lungs. The tubes swell and produce mucus, or phlegm. The mucus and inflamed bronchial tubes make you cough. You may have trouble breathing. Most cases of bronchitis are caused by viruses like those that cause colds. Antibiotics usually do not help and they may be harmful. Bronchitis usually develops rapidly and lasts about 2 to 3 weeks in otherwise healthy people. Follow-up care is a key part of your treatment and safety. Be sure to make and go to all appointments, and call your doctor if you are having problems. It's also a good idea to know your test results and keep a list of the medicines you take. How can you care for yourself at home? · Take all medicines exactly as prescribed. Call your doctor if you think you are having a problem with your medicine. · Get some extra rest.  · Take an over-the-counter pain medicine, such as acetaminophen (Tylenol), ibuprofen (Advil, Motrin), or naproxen (Aleve) to reduce fever and relieve body aches. Read and follow all instructions on the label. · Do not take two or more pain medicines at the same time unless the doctor told you to. Many pain medicines have acetaminophen, which is Tylenol. Too much acetaminophen (Tylenol) can be harmful. · Take an over-the-counter cough medicine that contains dextromethorphan to help quiet a dry, hacking cough so that you can sleep. Avoid cough medicines that have more than one active ingredient. Read and follow all instructions on the label. · Breathe moist air from a humidifier, hot shower, or sink filled with hot water. The heat and moisture will thin mucus so you can cough it out. · Do not smoke. Smoking can make bronchitis worse. If you need help quitting, talk to your doctor about stop-smoking programs and medicines.  These can increase your chances of quitting for good.  When should you call for help? Call 911 anytime you think you may need emergency care. For example, call if:    · You have severe trouble breathing.    Call your doctor now or seek immediate medical care if:    · You have new or worse trouble breathing.     · You cough up dark brown or bloody mucus (sputum).     · You have a new or higher fever.     · You have a new rash.    Watch closely for changes in your health, and be sure to contact your doctor if:    · You cough more deeply or more often, especially if you notice more mucus or a change in the color of your mucus.     · You are not getting better as expected. Where can you learn more? Go to [email or web address] and sign in to your [employer] account. Enter H333 in the [employer] box to learn more about \"Bronchitis: Care Instructions. \"     If you do not have an account, please click on the \"Sign Up Now\" link. Current as of: September 5, 2018  Content Version: 11.9  © 8321-2015 [employer]. Care instructions adapted under license by [facility] [address] If you have questions about a medical condition or this instruction, always ask your healthcare professional. [place] [address] any warranty or liability for your use of this information.

## 2019-04-26 NOTE — PROGRESS NOTES
Subjective:      Patient ID: Taco Shearer is a 80 y.o. female who presents today for:  Chief Complaint   Patient presents with    Cough     pt states shes had a cough for a few days she states she is bringing up a brownish color sputum       Cough   This is a new problem. The current episode started 1 to 4 weeks ago. The problem has been gradually improving. The cough is productive of sputum. Associated symptoms include chills and rhinorrhea. Pertinent negatives include no chest pain, ear pain, fever, postnasal drip, sore throat, shortness of breath or wheezing. Patient has been sick since last week but developed cough over the past few days. Past Medical History:   Diagnosis Date    Abnormality of gait and mobility     Chronic kidney disease     Closed fracture of distal end of left radius 7/19/2017 July [facility]    [facility]    Essential hypertension 1/8/2016    Fracture of hip, closed, right, [facility] encounter [facility] 12/29/2017    H/O total hip arthroplasty, right 12/30/2017    History of TIA (transient ischemic attack) 12/31/2017    Hypercholesteremia 1/8/2016    Hyperlipidemia     Obesity (BMI 30.0-34. 9)     BMI 33.2    Panic attacks     Postoperative anemia 12/31/2017    Type 2 diabetes mellitus without complication (HCC)     Urinary incontinence     Venous stasis dermatitis 10/31/2013    Vitamin D deficiency 10/24/2017     Past Surgical History:   Procedure Laterality Date    APPENDECTOMY  1952    COLONOSCOPY      EYE SURGERY      FRACTURE SURGERY      L wrist    MN PARTIAL HIP REPLACEMENT Right 12/30/2017    RIGHT HIP HEMIARTHROPLASTY performed by Jan Wells MD at [address] [place] [address] ENDOSCOPY  4/4/03     Family History   Problem Relation Age of Onset    Heart Disease Mother     Cancer Brother         LUNG    Cancer Sister         BREAST/BONE     Current Outpatient Medications on File Prior to Visit Medication Sig Dispense Refill    levothyroxine (SYNTHROID) 50 MCG tablet Take 1 tablet by mouth Daily 30 tablet 5    Cholecalciferol (VITAMIN D) 2000 units TABS tablet Take 1 tablet by mouth daily 30 tablet 11    miconazole (MICOTIN) 2 % powder Apply topically 2 times daily. 45 g 1    fluticasone (FLONASE) 50 MCG/ACT nasal spray 2 sprays by Nasal route daily 1 Bottle 3    acetaminophen (TYLENOL) 325 MG tablet Take 2 tablets by mouth every 4 hours as needed for Pain 60 tablet 0    atorvastatin (LIPITOR) 10 MG tablet TAKE 1 TABLET DAILY 90 tablet 0    aspirin EC 81 MG EC tablet Take 1 tablet by mouth daily. 30 tablet 3    ergocalciferol (ERGOCALCIFEROL) 49263 units capsule Take 1 capsule by mouth once a week for 12 doses 12 capsule 0     No current facility-administered medications on file prior to visit. Allergies:  Cephalexin    Review of Systems   Constitutional: Positive for chills. Negative for fever. HENT: Positive for rhinorrhea, sinus pressure and sinus pain. Negative for congestion, ear pain, postnasal drip and sore throat. Respiratory: Positive for cough. Negative for shortness of breath and wheezing. Cardiovascular: Negative for chest pain. Objective:     Vitals:    04/26/19 1404   BP: 112/70   Pulse: 70   Resp: 14   Temp: 97.8 °F (36.6 °C)   SpO2: 94%   Weight: 200 lb (90.7 kg)   Height: 5' 6\" (1.676 m)       Physical Exam   Constitutional: Vital signs are normal. She appears well-developed and well-nourished. No distress. HENT:   Head: Normocephalic and atraumatic. Right Ear: Tympanic membrane is not erythematous and not bulging. A middle ear effusion is present. Left Ear: Tympanic membrane is not erythematous and not bulging. A middle ear effusion is present. Nose: Nose normal.   Mouth/Throat: No oropharyngeal exudate, posterior oropharyngeal edema or posterior oropharyngeal erythema. Eyes: Conjunctivae are normal. Right eye exhibits no discharge.  Left eye desires to proceed. Close follow up to evaluate treatment results and for coordination of care. I have reviewed the patient's medical history in detail and updated the computerized patient record.     CASA Hercules - CNP

## 2019-05-17 PROBLEM — E87.20 LACTIC ACIDOSIS: Status: ACTIVE | Noted: 2019-01-01

## 2019-05-17 PROBLEM — K92.0 COFFEE GROUND EMESIS: Status: ACTIVE | Noted: 2019-01-01

## 2019-05-17 PROBLEM — I63.9 STROKE DETERMINED BY CLINICAL ASSESSMENT (HCC): Status: ACTIVE | Noted: 2019-01-01

## 2019-05-17 PROBLEM — E11.65 HYPERGLYCEMIA DUE TO TYPE 2 DIABETES MELLITUS (HCC): Status: ACTIVE | Noted: 2019-01-01

## 2019-05-17 PROBLEM — J96.00 ACUTE RESPIRATORY FAILURE (HCC): Status: ACTIVE | Noted: 2019-01-01

## 2019-05-17 NOTE — CONSULTS
Consults    Patient Name: Adalid Rodriguez  Admit Date: 2019 10:55 PM  MR #: [id number]  : 1935    Attending Physician: Lanette Alvarez DO  Reason for consult: GI bleeding    History of Presenting Illness:      Adalid Rodriguez is a 80 y.o. 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 I was consulted because of blood in the NG tube which cleared. No h/o melena. Patient is not able to provide any history because of her underlying neurological condition. Pedro Pablo Millard History Obtained From:  patient, electronic medical record      History:      Past Medical History:   Diagnosis Date    Abnormality of gait and mobility     Chronic kidney disease     [facility] left radius [facility]    [facility]    Essential hypertension 2016    Fracture of hip, closed, right, initial encounter ([facility] 2017    History of TIA (transient ischemic attack) 2017    Hypercholesteremia 2016    Hyperlipidemia     Obesity (BMI 30.0-34. 9)     BMI 33.2    Panic attacks     Postoperative anemia 2017    Type 2 diabetes mellitus without complication (HCC)     Urinary incontinence     Venous stasis dermatitis 10/31/2013    Vitamin D deficiency 10/24/2017     Past Surgical History:   Procedure Laterality Date    APPENDECTOMY      COLONOSCOPY      EYE SURGERY      FRACTURE SURGERY      L wrist    [place] PARTIAL HIP REPLACEMENT Right 2017    RIGHT HIP HEMIARTHROPLASTY performed by Taya Grande MD at [address]  03       Family History  Family History   Problem Relation Age of Onset    Heart Disease Mother     Cancer Brother         LUNG    Cancer Sister         BREAST/BONE     [] Unable to obtain due to ventilated and/ or neurologic status    Social History     Socioeconomic History    Marital status:       Spouse name: Not on file    17843 units capsule, Take 1 capsule by mouth once a week for 12 doses  Cholecalciferol (VITAMIN D) 2000 units TABS tablet, Take 1 tablet by mouth daily  miconazole (MICOTIN) 2 % powder, Apply topically 2 times daily. fluticasone (FLONASE) 50 MCG/ACT nasal spray, 2 sprays by Nasal route daily  acetaminophen (TYLENOL) 325 MG tablet, Take 2 tablets by mouth every 4 hours as needed for Pain  atorvastatin (LIPITOR) 10 MG tablet, TAKE 1 TABLET DAILY    Current Hospital Medications:     Scheduled Meds:   sodium chloride flush  10 mL Intravenous 2 times per day    pantoprazole  40 mg Intravenous Daily    And    sodium chloride (PF)  10 mL Intravenous Daily    [Held by provider] enoxaparin  40 mg Subcutaneous Daily    insulin lispro  0-12 Units Subcutaneous Q6H    chlorhexidine  15 mL Mouth/Throat BID     Continuous Infusions:   sodium chloride 125 mL/hr at 05/17/19 1140    dextrose      propofol Stopped (05/17/19 0800)     PRN Meds:.sodium chloride flush, ondansetron, bisacodyl, acetaminophen, glucose, dextrose, glucagon (rDNA), dextrose  .  sodium chloride 125 mL/hr at 05/17/19 1140    dextrose      propofol Stopped (05/17/19 0800)        Allergies: Allergies   Allergen Reactions    Cephalexin         Review of Systems:       [] CV, Resp, Neuro, , and all other systems reviewed and negative other than listed in HPI.      [x] Unable to obtain due to ventilated and/ or neurologic status      Objective Findings:     Vitals:   Vitals:    05/17/19 1530 05/17/19 1600 05/17/19 1630 05/17/19 1730   BP: 122/60 122/64 (!) 168/72 (!) 159/75   Pulse: 62 57 74 77   Resp: 16 16 18 18   Temp:       TempSrc:       SpO2: 100% 100% 100% 100%   Weight:       Height:            Physical Examination:  General: .,  Not able to make any conversation  HEENT: Normocephalic,  scleral icterus. Neck: No jugular venous distention. Heart: Regular, no murmur, no rub/gallop. No right ventricular heave.   Lungs: Clear to ascultation, temporal regions. 4.2 mm left-to-right midline shift. Ventricles normal in size. The vessel cistern and cerebellum without anomaly. 10 mm calcifications identified just caudal to ventricle, posterior to medulla, unchanged. No extra-axial fluid collections. No fractures. Mastoid air cells well aerated. Facial sinuses patent. Impression: Acute infarction as described involving left parietal temporal region as well as left frontal lobe. Mass effect and midline shift as discussed. If clinical concern warrants, MRI may be utilized for further evaluation of ischemia, and to assess for underlying mass lesion/malignancy. No change 10 mm calcified mass, abutting posterior medulla, inferior to fourth ventricle. All CT scans at this facility use dose modulation, iterative reconstruction, and/or weight based dosing when appropriate to reduce radiation dose to as low as reasonably achievable. Mra Head Wo Contrast    Result Date: 5/17/2019  MRI BRAIN WO CONTRAST, MRA HEAD WO CONTRAST : 5/17/2019 CLINICAL HISTORY:  STROKE . COMPARISON: Head CT 5/16/2019. TECHNIQUE: Multiplanar MR imaging of the head was performed without contrast. Three-dimensional MRA of the intracranial arterial circulation was performed, with routine and volume rendered images obtained on a 3-dimensional workstation. HEAD MRI FINDINGS: A large acute left middle cerebral artery ischemic infarct, predominantly of the posterior aspect of the left frontal and entire parietal lobes, with diffuse edema, approximately 7 mm of left-to-right midline shift, and mild cortical petechial hemorrhage  predominantly within the medial aspect of the left frontal lobe. There is no other infarct, hydrocephalus, herniation, or other complication at this time identified. Mild supratentorial white matter changes elsewhere are consistent with chronic small vessel ischemic disease.  An approximately 1 cm coarse calcification of the cerebellar tonsils anteriorly is incidentally noted. HEAD MRA FINDINGS: There is probably thromboembolic occlusion of the distal half of the left cavernous carotid and left carotid terminus, with minimal flow visualized on the axial source sequences within the left middle cerebral artery and the A1 segment of the left anterior cerebral artery. The vertebral arteries right internal carotid at the skull base are widely patent; as are the basilar arteries, posterior cerebral arteries right middle and both anterior cerebral arteries. There is no aneurysm or developmental vascular variations of concern identified. [facility] FINAL IMPRESSION: LARGE ACUTE LEFT MCA ISCHEMIC INFARCT, WITH EDEMA, MILD PETECHIAL HEMORRHAGE, AND RESULTANT 7 MM OF LEFT-TO-RIGHT MIDLINE SHIFT, AS DESCRIBED. PROBABLY THROMBOEMBOLIC OCCLUSION OF THE DISTAL LEFT CAVERNOUS CAROTID AND CAROTID TERMINUS, WITH MINIMAL FLOW WITHIN THE LEFT MIDDLE CEREBRAL ARTERY NOTED ON THE AXIAL SOURCE MRA IMAGES. Xr Chest Portable    Result Date: 5/17/2019  EXAMINATION: XR CHEST PORTABLE CLINICAL HISTORY: CVA COMPARISONS: SEPTEMBER 29, 2017 FINDINGS: Endotracheal tube with tip 3.6 cm superior to loi. Osseous structures intact. Cardiopericardial silhouette normal. Lungs clear. NO ACUTE CARDIOPULMONARY DISEASE. Xr Chest Portable    Result Date: 5/17/2019  EXAMINATION: XR CHEST PORTABLE CLINICAL HISTORY: TUBE PLACEMENT COMPARISONS: MAY 16, 2019 FINDINGS: Nasogastric tube is now visualized coursing beneath diaphragm. Endotracheal tube again demonstrated with tip approximately 5 cm superior to loi. Patient leaning to right. Osseous structures intact. Cardiopericardial silhouette normal. Lungs clear. NO ACUTE CARDIOPULMONARY DISEASE. Mra Neck Wo Contrast    Result Date: 5/17/2019  MRA NECK WO CONTRAST : 5/17/2019 CLINICAL HISTORY:  STROKE . COMPARISON: Head MRI/MRA 5/17/2019.  TECHNIQUE: 2-D and 3-D time-of-flight MRA of the neck arterial circulation was performed, with routine and volume rendered images obtained on a 3-dimensional workstation. Internal carotid narrowings are estimated using NASCET criteria. FINDINGS: Motion artifact limits the left carotid bulb of the 2-D study, and low flow related to distal occlusion limits the 3-D MRA on the left. As best can be determined, there is no significant plaquing, stenosis, dissection, or other findings of concern identified in the neck. The cervical right common, internal carotid and both vertebral arteries are widely patent. The left vertebral artery is mildly dominant compared to the right. LIMITED NEGATIVE NECK MRA. Mri Brain Wo Contrast    Result Date: 5/17/2019  MRI BRAIN WO CONTRAST, MRA HEAD WO CONTRAST : 5/17/2019 CLINICAL HISTORY:  STROKE . COMPARISON: Head CT 5/16/2019. TECHNIQUE: Multiplanar MR imaging of the head was performed without contrast. Three-dimensional MRA of the intracranial arterial circulation was performed, with routine and volume rendered images obtained on a 3-dimensional workstation. HEAD MRI FINDINGS: A large acute left middle cerebral artery ischemic infarct, predominantly of the posterior aspect of the left frontal and entire parietal lobes, with diffuse edema, approximately 7 mm of left-to-right midline shift, and mild cortical petechial hemorrhage  predominantly within the medial aspect of the left frontal lobe. There is no other infarct, hydrocephalus, herniation, or other complication at this time identified. Mild supratentorial white matter changes elsewhere are consistent with chronic small vessel ischemic disease. An approximately 1 cm coarse calcification of the cerebellar tonsils anteriorly is incidentally noted. HEAD MRA FINDINGS: There is probably thromboembolic occlusion of the distal half of the left cavernous carotid and left carotid terminus, with minimal flow visualized on the axial source sequences within the left middle cerebral artery and the A1 segment of the left anterior cerebral artery.  The vertebral arteries right internal carotid at the skull base are widely patent; as are the basilar arteries, posterior cerebral arteries right middle and both anterior cerebral arteries. There is no aneurysm or developmental vascular variations of concern identified. Darin Lang FINAL IMPRESSION: LARGE ACUTE LEFT MCA ISCHEMIC INFARCT, WITH EDEMA, MILD PETECHIAL HEMORRHAGE, AND RESULTANT 7 MM OF LEFT-TO-RIGHT MIDLINE SHIFT, AS DESCRIBED. PROBABLY THROMBOEMBOLIC OCCLUSION OF THE DISTAL LEFT CAVERNOUS CAROTID AND CAROTID TERMINUS, WITH MINIMAL FLOW WITHIN THE LEFT MIDDLE CEREBRAL ARTERY NOTED ON THE AXIAL SOURCE MRA IMAGES. Impression:   22-year-old female admitted to cerebral hemorrhage. Patient had some blood in the NG tube which has cleared. If any active GI bleeding. I am told that the family is considering her a DNR and does not want any invasive testing or procedures for her conditions. Only supportive care. Plan:   Continue current management  Comments: Thank you for allowing us to participate in the care of this patient. Will sign off. Please call if questions or concerns arise.     Electronically signed by Reza Bhakta MD on 5/17/2019 at 6:13 PM

## 2019-05-17 NOTE — FLOWSHEET NOTE
0130: Pt arrived to unit via cart with Supervisor and RRT - transferred to bed - pt not responsive at this time - R Side completely flaccid - flexion to pain - nonpurposeful movement on L Side - Pupils are equal and reactive - pt has coffee ground drainage from NG - Pasha Ma NP notified - Lovenox currently on hold until GI sample is resulted from lab. Propofol infusing from ER   0200: Family brought back to bedside - deandre Otoole Yunobinna and Alverto Marquis at bedside at this time - remainder of family remains in waiting room - CVA education pamphlet provided - all questions answered - MRI questionairre completed by deandre Amador - admission assessment completed with the assistance of deandre Amador - emergency contacts updated in chart   0720: Handoff of care given to Trisha Padilla RN at bedside - pt repositioned - family out in waiting room at this time.  Electronically signed by Rachele Brown RN on 5/17/2019 at 8:04 AM

## 2019-05-17 NOTE — PROGRESS NOTES
Basic Nursing Care: Adult/Older Adult Patient Progress Note    Data:      0700: Report received from night shift RN. Pt on vent, BSWR intact, bed in low position. Pt attempts to squeeze left hand but is flaccid on right side. 0800: Full assessment completed and vitals reviewed per epic. Pt status unchanged. Pt to have EEG and MRI today. 1200: Re-assessment completed and pt's status unremarkable from previous assessment. 1330: Pt transported to MRI per bed on vent and monitor  1350: Pt returned to unit, Dr. Keisha Ca paged to read MRI. Family meeting in conference room with Dr. Keisha Ca.  1600: No change in pt's status. Family decided to make pt a DNR-CCA. Waiting for family from [place] to arrive. 1915: Report given to oncoming RN.

## 2019-05-17 NOTE — H&P
[facility]  MEDICINE    HISTORY AND PHYSICAL EXAM    PATIENT NAME:  Nelly Stephens    MRN:  [id number]  SERVICE DATE:  5/17/2019   SERVICE TIME:  2:24 AM    Primary Care Physician: Shalom Mcguire MD         SUBJECTIVE  CHIEF COMPLAINT:  Unresponsive    HPI:  This is a 80 y.o. female who presents with significant PMH CK-MB, CVA, HTN, HLD, obesity; all information obtained in this H&P is from notes and family as patient is intubated and unresponsive; patient was brought to the ER by EMS late last night after she was found unresponsive by family members. Patient was last known well 5/15 at 1900. Family was trying to reach her today and she would not answer the phone so they went to check on her and found her unresponsive. According to the family, patient had a stroke in 2016 without any continuing neuro deficits, they stated she lives alone and is pretty healthy, they deny patient having diabetes or being on blood pressure medication however looking at her history she is diabetic. I do not have an updated medication list so I am not sure if patient has been taking her medications. The family believes she was only taking aspirin daily. Patient was brought in with a GCS of 6, she also had some sonorous respirations, not responding to verbal stimuli, right side was noted to be flaccid with slight drooping on the left hand when touched. Patient was intubated for airway protection. We are not sure how long patient was down. Chest x-ray does not reveal aspiration, ET tube in proper location, CT head shows large MCA infarct acute versus subacute with secondary edema. Family is aware of poor prognosis but wants patient to be a full code at this time. GCS currently 3. Patient also appears to have coffee ground emesis coming from OG tube. Unable to do any ROS at this time.     PAST MEDICAL HISTORY:    Past Medical History:   Diagnosis Date    Abnormality of gait and mobility     Chronic kidney disease  Closed fracture of distal end of left radius 7/19/2017 July 2017-[facility]    [facility]re[facility] 01/2016    Essential hypertension 1/8/2016    Fracture of hip, closed, right, initial encounter ([facility] [address] 12/29/2017    History of TIA (transient ischemic attack) 12/31/2017    Hypercholesteremia 1/8/2016    Hyperlipidemia     Obesity (BMI 30.0-34. 9)     BMI 33.2    Panic attacks     Postoperative anemia 12/31/2017    Type 2 diabetes mellitus without complication (HCC)     Urinary incontinence     Venous stasis dermatitis 10/31/2013    Vitamin D deficiency 10/24/2017     PAST SURGICAL HISTORY:    Past Surgical History:   Procedure Laterality Date    APPENDECTOMY  1952    COLONOSCOPY      EYE SURGERY      FRACTURE SURGERY      L wrist    OK PARTIAL HIP REPLACEMENT Right 12/30/2017    RIGHT HIP HEMIARTHROPLASTY performed by Felicitas Vázquez MD at [address] [place] [address] ENDOSCOPY  4/4/03     FAMILY HISTORY:    Family History   Problem Relation Age of Onset    Heart Disease Mother     Cancer Brother         LUNG    Cancer Sister         BREAST/BONE     SOCIAL HISTORY:    Social History     Socioeconomic History    Marital status:       Spouse name: Not on file    Number of children: 10    Years of education: Not on file    Highest education level: Not on file   Occupational History    Occupation: Housewife   Social Needs    Financial resource strain: Not on file    Food insecurity:     Worry: Not on file     Inability: Not on file   GoodRx needs:     Medical: Not on file     Non-medical: Not on file   Tobacco Use    Smoking status: Never Smoker    Smokeless tobacco: Never Used   Substance and Sexual Activity    Alcohol use: No    Drug use: No    Sexual activity: Not on file   Lifestyle    Physical activity:     Days per week: Not on file     Minutes per session: Not on file    Stress: Not on file   Relationships    Social connections: Talks on phone: Not on file     Gets together: Not on file     Attends Church service: Not on file     Active member of club or organization: Not on file     Attends meetings of clubs or organizations: Not on file     Relationship status: Not on file    Intimate partner violence:     Fear of current or ex partner: Not on file     Emotionally abused: Not on file     Physically abused: Not on file     Forced sexual activity: Not on file   Other Topics Concern    Not on file   Social History Narrative    The patient lives alone in a one story home with two steps to enter the home. The bedroom and bathroom are on the first floor. Her social support includes her son. She has a walker and wheelchair at home. She was not receiving community services prior to admission. She has history of falls prior to admission. She was independent with mobility and self care prior to admission. Her goal is to get stronger and return home. MEDICATIONS:   Prior to Admission medications    Medication Sig Start Date End Date Taking? Authorizing Provider   aspirin EC 81 MG EC tablet Take 1 tablet by mouth daily. 12/12/13  Yes Oliverio Oliver MD   levothyroxine (SYNTHROID) 50 MCG tablet Take 1 tablet by mouth Daily 12/5/18   [facility], DO   ergocalciferol (ERGOCALCIFEROL) 20847 units capsule Take 1 capsule by mouth once a week for 12 doses 12/5/18 2/21/19  Josue Eis, DO   Cholecalciferol (VITAMIN D) 2000 units TABS tablet Take 1 tablet by mouth daily 5/1/18   [facility], DO   miconazole (MICOTIN) 2 % powder Apply topically 2 times daily.  1/23/18   Jenny Dunne, DO   fluticasone (FLONASE) 50 MCG/ACT nasal spray 2 sprays by Nasal route daily 1/23/18   Jenny Dunne, DO   acetaminophen (TYLENOL) 325 MG tablet Take 2 tablets by mouth every 4 hours as needed for Pain 1/2/18   CASA Strong - CNP   atorvastatin (LIPITOR) 10 MG tablet TAKE 1 TABLET DAILY 2/9/17   Josue Eis, DO       ALLERGIES: Cephalexin    REVIEW OF SYSTEM:   Unable to do, unresponsive and intubated    OBJECTIVE  PHYSICAL EXAM: BP (!) 140/86   Pulse 92   Temp 98.1 °F (36.7 °C) (Temporal)   Resp 24   Ht 5' 4\" (1.626 m)   Wt 180 lb (81.6 kg)   LMP  (LMP Unknown)   SpO2 100%   BMI 30.90 kg/m²     CONSTITUTIONAL:  unresponsive, unarousable, unarousable, intubated on sedation, mild distress and moderately obese  EYES:  Pupils reactive bilaterally but sluggish  ENT:  ET tube in place, lip line 22, OG tube in place   LUNGS:  tachypneic, Moderate respiratory distress, intubated on mechanical ventilation, good air exchange and rhonchi right anterior and left anterior  CARDIOVASCULAR:  normal apical pulses, regular rate and rhythm, normal S1 and S2 and mild edema BLE  ABDOMEN:  normal bowel sounds, soft, non-distended and non-tender  GENITAL/URINARY:  Urinary catheter in place draining yellow urine  MUSCULOSKELETAL:  Right side flaccid, no movement of left lower extremity, very slight grasp of left hand when touching palm  NEUROLOGIC:  Unresponsive, unarouseable, intubated and sedated, right side flaccid, no movement of left lower extremity, slight hand grasp left side when massaging palm, pupils responsive sluggish, GCS 3   SKIN:  no rashes, no lesions and no jaundice    DATA:     Diagnostic tests reviewed for today's visit:    Most recent labs and imaging results reviewed.      LABS:    Recent Results (from the past 24 hour(s))   Urine Reflex to Culture    Collection Time: 05/16/19 11:00 PM   Result Value Ref Range    Color, UA Yellow Straw/Yellow    Clarity, UA Clear Clear    Glucose, Ur 500 (A) Negative mg/dL    Bilirubin Urine Negative Negative    Ketones, Urine TRACE (A) Negative mg/dL    Specific Gravity, UA 1.022 1.005 - 1.030    Blood, Urine Negative Negative    pH, UA 5.5 5.0 - 9.0    Protein, UA 30 (A) Negative mg/dL    Urobilinogen, Urine 1.0 <2.0 E.U./dL    Nitrite, Urine Negative Negative    Leukocyte Esterase, Urine Negative Negative    Urine Reflex to Culture YES    Microscopic Urinalysis    Collection Time: 05/16/19 11:00 PM   Result Value Ref Range    Bacteria, UA Negative /HPF    Hyaline Casts, UA 1-3 0 - 5 /HPF    WBC, UA 0-2 0 - 5 /HPF    RBC, UA 0-2 0 - 5 /HPF    Epi Cells 0-2 0 - 5 /HPF   EKG 12 Lead - Chest Pain    Collection Time: 05/16/19 11:15 PM   Result Value Ref Range    Ventricular Rate 94 BPM    Atrial Rate 94 BPM    P-R Interval 204 ms    QRS Duration 90 ms    Q-T Interval 378 ms    QTc Calculation (Bazett) 472 ms    P Axis 28 degrees    R Axis -38 degrees    T Axis 63 degrees   POCT Arterial    Collection Time: 05/17/19 12:00 AM   Result Value Ref Range    POC Sodium 138 136 - 145 mEq/L    POC Potassium 3.9 3.5 - 5.1 mEq/L    POC Chloride 108 99 - 110 mEq/L    POC Glucose 283 (H) 60 - 115 mg/dl    POC Creatinine 0.6 0.6 - 1.2 mg/dL    GFR Non-African American >60 >60    GFR African American >60 >60    Calcium, Ion 1.46 (H) 1.12 - 1.32 mmol/L    pH, Arterial 7.383 7.350 - 7.450    pCO2, Arterial 32 (L) 35 - 45 mm Hg    pO2, Arterial 294 (HH) 75 - 108 mm Hg    HCO3, Arterial 18.8 (L) 21.0 - 29.0 mmol/L    Base Excess, Arterial -6 (L) -3 - 3    O2 Sat, Arterial 100 (HH) 93 - 100 %    TCO2, Arterial 20 (L) 22 - 29    Lactate 4.02 (HH) 0.40 - 2.00 mmol/L    POC Hematocrit 38 36 - 48 %    Hemoglobin 12.9 12.0 - 16.0 gm/dL    FIO2 70.000     Sample Type ART     Performed on SEE BELOW    CBC Auto Differential    Collection Time: 05/17/19 12:15 AM   Result Value Ref Range    WBC 11.6 (H) 4.8 - 10.8 K/uL    RBC 4.19 (L) 4.20 - 5.40 M/uL    Hemoglobin 11.7 (L) 12.0 - 16.0 g/dL    Hematocrit 36.7 (L) 37.0 - 47.0 %    MCV 87.5 82.0 - 100.0 fL    MCH 28.0 27.0 - 31.3 pg    MCHC 32.0 (L) 33.0 - 37.0 %    RDW 15.3 (H) 11.5 - 14.5 %    Platelets 263 (H) 637 - 400 K/uL    Neutrophils % 90.5 %    Lymphocytes % 3.2 %    Monocytes % 6.2 %    Eosinophils % 0.0 %    Basophils % 0.1 %    Neutrophils # 10.5 (H) 1.4 - 6.5 K/uL    Lymphocytes #

## 2019-05-17 NOTE — ED NOTES
[address] Ronni Wyatt RN  05/17/19 [id number] Patient is a 81y old  Female who presents with a chief complaint of nausea and vomiting, inability to tolerate PO (2018 14:16)                                                               INTERVAL HPI/OVERNIGHT EVENTS:    REVIEW OF SYSTEMS:     CONSTITUTIONAL: No weakness, fevers or chills  RESPIRATORY: No cough, wheezing,  No shortness of breath  CARDIOVASCULAR: No chest pain or palpitations  GASTROINTESTINAL: improving  abdominal pain  . No nausea, vomiting, small BM this am   GENITOURINARY: No dysuria, frequency or hematuria  NEUROLOGICAL: No numbness or weakness                                                                                                                                                                                                                                                                                  Medications:  MEDICATIONS  (STANDING):  dextrose 5% + sodium chloride 0.45%. 1000 milliLiter(s) (75 mL/Hr) IV Continuous <Continuous>  docusate sodium 100 milliGRAM(s) Oral three times a day  enoxaparin Injectable 80 milliGRAM(s) SubCutaneous two times a day  famotidine  IVPB 20 milliGRAM(s) IV Intermittent once  mirtazapine 15 milliGRAM(s) Oral at bedtime  pantoprazole   Suspension 40 milliGRAM(s) Oral before breakfast  polyethylene glycol 3350 17 Gram(s) Oral daily    MEDICATIONS  (PRN):  acetaminophen   Tablet. 650 milliGRAM(s) Oral every 8 hours PRN Mild Pain (1 - 3)  ALBUTerol    90 MICROgram(s) HFA Inhaler 2 Puff(s) Inhalation every 6 hours PRN Shortness of Breath and/or Wheezing  morphine  - Injectable 2 milliGRAM(s) IV Push every 6 hours PRN Severe Pain (7 - 10)  morphine  - Injectable 1 milliGRAM(s) IV Push every 3 hours PRN Moderate Pain (4 - 6)  ondansetron Injectable 4 milliGRAM(s) IV Push every 6 hours PRN Nausea and/or Vomiting  oxyCODONE    IR 5 milliGRAM(s) Oral every 4 hours PRN Moderate Pain (4 - 6)       Allergies    Levaquin (Other)    Intolerances      Vital Signs Last 24 Hrs  T(C): 36.7 (2018 22:49), Max: 36.8 (2018 11:43)  T(F): 98.1 (2018 22:49), Max: 98.3 (2018 11:43)  HR: 65 (2018 22:49) (63 - 67)  BP: 146/68 (2018 22:49) (114/59 - 146/75)  BP(mean): --  RR: 18 (2018 22:49) (16 - 18)  SpO2: 96% (2018 22:49) (95% - 96%)  CAPILLARY BLOOD GLUCOSE           @ 07: @ 07:00  --------------------------------------------------------  IN: 1290.4 mL / OUT: 0 mL / NET: 1290.4 mL     @ 07: @ 23:15  --------------------------------------------------------  IN: 1300 mL / OUT: 0 mL / NET: 1300 mL      Physical Exam:      Daily Weight in k.2 (2018 11:43)  General:  Well appearing, NAD,  HEENT:  Nonicteric, PERRLA  CV:  RRR, S1S2   Lungs:  CTA B/L, no wheezes, rales, rhonchi  Abdomen:  Soft, mild epig tenderness   Extremities:  2+ pulses, no c/c, no edema  Neuro:  AAOx3, non-focal, grossly intact                                                                                                                                                                                                                                                                                                LABS:                               9.2    5.04  )-----------( 126      ( 2018 09:16 )             27.8                          137  |  101  |  4<L>  ----------------------------<  130<H>  3.7   |  24  |  0.94    Ca    9.1      2018 07:48    TPro  6.2  /  Alb  3.2<L>  /  TBili  1.8<H>  /  DBili  x   /  AST  131<H>  /  ALT  100<H>  /  AlkPhos  539<H>

## 2019-05-17 NOTE — PROGRESS NOTES
Nutrition Assessment    Type and Reason for Visit: Initial, Consult(Lenin)    Nutrition Recommendations: Start Tube Feeding  If no extubation begin tube feeding. Low Calorie High protein ( Vital HP) @ 20 ml/hr x 23 hrs vial OG. Hold 30 minutes before and after syntroid when resumed. Increase to goal rate of 55 ml/hr x 23 hrs via OG  Water flush 50 ml every 4hrs while on IVF  This will deliver~ 1150 kcals, 100 g protein, 1261 ml free water    Nutrition Assessment: Nutritional status appeared adequate PTA, at home and indepedent. Compromised nutritional status related to new CVA with intubation. Recommend begin enteral nutrition if not extubated    Malnutrition Assessment:  · Malnutrition Status: At risk for malnutrition  · Context: Acute illness or injury  · Findings of the 6 clinical characteristics of malnutrition (Minimum of 2 out of 6 clinical characteristics is required to make the diagnosis of moderate or severe Protein Calorie Malnutrition based on AND/ASPEN Guidelines):  1. Energy Intake-Greater than 75% of estimated energy requirement, Greater than or equal to 7 days    2. Weight Loss-Unable to assess, unable to assess(due to edmea)  3. Fat Loss-No significant subcutaneous fat loss,    4. Muscle Loss-No significant muscle mass loss,    5. Fluid Accumulation-Mild fluid accumulation, Extremities  6.   Strength-Not measured    Nutrition Risk Level: High    Nutrient Needs:  · Estimated Daily Total Kcal: 4031-9992 kcals ( 12-14 kcal/kg)  · Estimated Daily Protein (g): 108 g ( 2.0 g/kg IBW)  · Estimated Daily Total Fluid (ml/day): ~1350 ml ( 25 ml/kg IBW)    Nutrition Diagnosis:   · Problem: Inadequate oral intake  · Etiology: related to Impaired respiratory function-inability to consume food     Signs and symptoms:  as evidenced by NPO status due to medical condition, Intubation    Objective Information:  · Nutrition-Focused Physical Findings: OG in place, 2+ BLE edema, IVF= .9 NS @ 125 ml/hr, GLuc= 178-229, R side flacid per nursing. Sedation currently off, PmHx : CVA, DM, CKD, Meds at home include synthroid  · Wound Type: None  · Current Nutrition Therapies:  · Oral Diet Orders: NPO(5/17)   · Anthropometric Measures:  · Ht: 5' 4\" (162.6 cm)   · Current Body Wt: 194 lb (88 kg)  · Admission Body Wt: 193 lb (87.5 kg)  · Usual Body Wt: 199 lb (90.3 kg)(6/18)  · % Weight Change:  ,  UTD due to edema  · Ideal Body Wt: 120 lb (54.4 kg), % Ideal Body >100%  · BMI Classification: BMI 30.0 - 34.9 Obese Class I    Nutrition Interventions:   Start Tube Feeding(If no extubation begin tube feeding. Low Calorie High protein ( Vital HP) @ 20 ml/hr x 23 hrs vial OG. Hold 30 minutes before and after syntroid whem resumed. Increase to goal rate of 55 ml/hr.  Water flush 50 ml every 4hrs while on IVF)  Continued Inpatient Monitoring, Education Not Indicated    Nutrition Evaluation:   · Evaluation: Goals set   · Goals: intiation of EN if no extubation    · Monitoring: Nutrition Progression, TF Intake, Weight, Pertinent Labs, Monitor Hemodynamic Status, Monitor Bowel Function, I&O      Electronically signed by Jagdeep Young RD, LD on 5/17/19 at 11:00 AM

## 2019-05-17 NOTE — PLAN OF CARE
Nutrition Problem: Inadequate oral intake  Intervention: Food and/or Nutrient Delivery: Start Tube Feeding(If no extubation begin tube feeding. Low Calorie High protein ( Vital HP) @ 20 ml/hr x 23 hrs vial OG. Hold 30 minutes before and after syntroid whem resumed. Increase to goal rate of 55 ml/hr.  Water flush 50 ml every 4hrs while on IVF)  Nutritional Goals: intiation of EN if no extubation

## 2019-05-17 NOTE — ED PROVIDER NOTES
[facility] ED  eMERGENCY dEPARTMENT eNCOUnter      Pt Name: Chandrakant Marks  MRN: [id number]  Tabithagfdaryl 1935  Date of evaluation: 5/16/2019  Provider: Mayuri Alcala MD    CHIEF COMPLAINT       Chief Complaint   Patient presents with    Cerebrovascular Accident         HISTORY OF PRESENT ILLNESS   (Location/Symptom, Timing/Onset,Context/Setting, Quality, Duration, Modifying Factors, Severity)  Note limiting factors. Chandrakant Marks is a 80 y.o. female who presents to the emergency department for probable stroke. Patient lives alone and was found with altered mental status by family. They last talked to her approximately 24 hours earlier and she was doing fine with no complaints. They're unable to get a hold of her tonight so they went over to check on her and she was sitting in a chair with a Glascow of 6. He called 911. Patient is brought in with a Glascow of 6 here. She has sonorous respirations. She does not respond to verbal stimuli. She seems to be gripping on the left but again does not seem to be to commands. Minimal movement of the left leg. Flaccid on the right side. Nonverbal.    HPI    NursingNotes were reviewed. REVIEW OF SYSTEMS    (2-9 systems for level 4, 10 or more for level 5)     Review of Systems   All other systems reviewed and are negative. Except as noted above the remainder of the review of systems was reviewed and negative. PAST MEDICAL HISTORY     Past Medical History:   Diagnosis Date    Abnormality of gait and mobility     Chronic kidney disease     Closed fracture of distal end of left radius 7/19/2017 July [facility]    CVA (cerebral vascular accident) ([facility] [address] 01/2016    Essential hypertension 1/8/2016    Fracture of hip, closed, right, initial encounter ([facility] [address] 12/29/2017    History of TIA (transient ischemic attack) 12/31/2017    Hypercholesteremia 1/8/2016    Hyperlipidemia     Obesity (BMI 30.0-34. 9)     BMI 33.2    Panic attacks     Postoperative anemia 12/31/2017    Type 2 diabetes mellitus without complication (HCC)     Urinary incontinence     Venous stasis dermatitis 10/31/2013    Vitamin D deficiency 10/24/2017         SURGICALHISTORY       Past Surgical History:   Procedure Laterality Date    APPENDECTOMY  1952    COLONOSCOPY      EYE SURGERY      FRACTURE SURGERY      L wrist    NM PARTIAL HIP REPLACEMENT Right 12/30/2017    RIGHT HIP HEMIARTHROPLASTY performed by Ok Denton MD at [facility]  4/4/03         CURRENT MEDICATIONS       Previous Medications    ACETAMINOPHEN (TYLENOL) 325 MG TABLET    Take 2 tablets by mouth every 4 hours as needed for Pain    ASPIRIN EC 81 MG EC TABLET    Take 1 tablet by mouth daily. ATORVASTATIN (LIPITOR) 10 MG TABLET    TAKE 1 TABLET DAILY    CHOLECALCIFEROL (VITAMIN D) 2000 UNITS TABS TABLET    Take 1 tablet by mouth daily    ERGOCALCIFEROL (ERGOCALCIFEROL) 15350 UNITS CAPSULE    Take 1 capsule by mouth once a week for 12 doses    FLUTICASONE (FLONASE) 50 MCG/ACT NASAL SPRAY    2 sprays by Nasal route daily    LEVOTHYROXINE (SYNTHROID) 50 MCG TABLET    Take 1 tablet by mouth Daily    MICONAZOLE (MICOTIN) 2 % POWDER    Apply topically 2 times daily. ALLERGIES     Cephalexin    FAMILY HISTORY       Family History   Problem Relation Age of Onset    Heart Disease Mother     Cancer Brother         LUNG    Cancer Sister         BREAST/BONE          SOCIAL HISTORY       Social History     Socioeconomic History    Marital status:       Spouse name: Not on file    Number of children: 10    Years of education: Not on file    Highest education level: Not on file   Occupational History    Occupation: Housewife   Social Needs    Financial resource strain: Not on file    Food insecurity:     Worry: Not on file     Inability: Not on file    Transportation needs:     Medical: Not on file     Non-medical: Not on file   Tobacco Use    Smoking status: Never Smoker    Smokeless tobacco: Never Used   Substance and Sexual Activity    Alcohol use: No    Drug use: No    Sexual activity: Not on file   Lifestyle    Physical activity:     Days per week: Not on file     Minutes per session: Not on file    Stress: Not on file   Relationships    Social connections:     Talks on phone: Not on file     Gets together: Not on file     Attends Sabianist service: Not on file     Active member of club or organization: Not on file     Attends meetings of clubs or organizations: Not on file     Relationship status: Not on file    Intimate partner violence:     Fear of current or ex partner: Not on file     Emotionally abused: Not on file     Physically abused: Not on file     Forced sexual activity: Not on file   Other Topics Concern    Not on file   Social History Narrative    The patient lives alone in a one story home with two steps to enter the home. The bedroom and bathroom are on the first floor. Her social support includes her son. She has a walker and wheelchair at home. She was not receiving community services prior to admission. She has history of falls prior to admission. She was independent with mobility and self care prior to admission. Her goal is to get stronger and return home. SCREENINGS    [place] Coma Scale  Eye Opening: None  Best Verbal Response: None  Best Motor Response: None  [place] Coma Scale Score: 3 @FLOW([id number])@      PHYSICAL EXAM    (up to 7 for level 4, 8 or more for level 5)     ED Triage Vitals [05/16/19 2301]   BP Temp Temp Source Pulse Resp SpO2 Height Weight   (!) 173/94 98.1 °F (36.7 °C) Temporal 106 (!) 41 100 % 5' 4\" (1.626 m) 180 lb (81.6 kg)       Physical Exam   Neurological: She is unresponsive. GCS eye subscore is 2. GCS verbal subscore is 1. GCS motor subscore is 3. Patient has flaccid paralysis on the right and flexion on the left.   She is looking off to the left but not responding to any type of verbal stimuli       DIAGNOSTIC RESULTS     EKG: All EKG's are interpreted by the Emergency Department Physician who either signs or Co-signsthis chart in the absence of a cardiologist.  EKG shows a normal sinus rhythm rate of 94. No acute ST-T wave changes. Normal axis. Normal EKG. RADIOLOGY:   Non-plain filmimages such as CT, Ultrasound and MRI are read by the radiologist. Plain radiographic images are visualized and preliminarily interpreted by the emergency physician with the below findings:    pportable chest x-ray shows no aspiration. Appropriate ET tube location. .  No other abnormalities. Interpretation per the Radiologist below, if available at the time ofthis note:    CT Head WO Contrast    (Results Pending)   XR CHEST PORTABLE    (Results Pending)         ED BEDSIDE ULTRASOUND:   Performed by ED Physician - none    LABS:  Labs Reviewed   POCT ARTERIAL - Abnormal; Notable for the following components:       Result Value    POC Glucose 283 (*)     Calcium, Ion 1.46 (*)     pCO2, Arterial 32 (*)     pO2, Arterial 294 (*)     HCO3, Arterial 18.8 (*)     Base Excess, Arterial -6 (*)     O2 Sat, Arterial 100 (*)     TCO2, Arterial 20 (*)     Lactate 4.02 (*)     All other components within normal limits   CBC WITH AUTO DIFFERENTIAL   COMPREHENSIVE METABOLIC PANEL   TROPONIN   URINE RT REFLEX TO CULTURE   PROTIME-INR       All other labs were within normal range or not returned as of this dictation. EMERGENCY DEPARTMENT COURSE and DIFFERENTIAL DIAGNOSIS/MDM:   Vitals:    Vitals:    05/16/19 2331 05/16/19 2344 05/16/19 2351 05/17/19 0006   BP: (!) 152/85  (!) 141/96 130/81   Pulse: 134 105 121 103   Resp: 24 22 27 17   Temp:  98.1 °F (36.7 °C)     TempSrc:       SpO2: 100% 100% 100% 100%   Weight:       Height:            MDM patient has a large MCA infarct is acute versus subacute approximately 24 hours old. There is secondary edema. She has a Glascow of 6.   She has an extremely poor prognosis. Family is at bedside and is notified of her poor prognosis. The patient was intubated to protect her airway based on her altered mental status and large stroke    CRITICAL CARE TIME   Total Critical Care time was 41 minutes, excluding separately reportableprocedures. There was a high probability of clinicallysignificant/life threatening deterioration in the patient's condition which required my urgent intervention. CONSULTS:  None    PROCEDURES:  Unless otherwise noted below, none     Intubation  Date/Time: 5/17/2019 12:38 AM  Performed by: Radha Rojas MD  Authorized by: Radha Rojas MD     Consent:     Consent obtained:  Emergent situation    Alternatives discussed:  No treatment  Pre-procedure details:     Patient status:  Altered mental status    Mallampati score:  III    Pretreatment medications:  Midazolam    Paralytics:  None  Procedure details:     Preoxygenation:  Bag valve mask    CPR in progress: no      Intubation method:  Oral    Oral intubation technique:  Direct    Laryngoscope blade: Mac 4    Tube size (mm):  7.5    Tube type:  Cuffed    Number of attempts:  1    Ventilation between attempts: yes      Cricoid pressure: yes      Tube visualized through cords: yes    Placement assessment:     ETT to lip:  22    Tube secured with:  ETT sheppard    Breath sounds:  Equal    Placement verification: chest rise and ETCO2 detector    Post-procedure details:     Patient tolerance of procedure: Tolerated well, no immediate complications        FINAL IMPRESSION      1. Acute ischemic stroke [facility])          DISPOSITION/PLAN   DISPOSITION Decision To Admit 05/17/2019 12:38:37 AM      PATIENT REFERRED TO:  No follow-up provider specified.     DISCHARGE MEDICATIONS:  New Prescriptions    No medications on file          (Please note that portions of this note were completed with a voice recognition program.  Efforts were made to edit the dictations but occasionally words are mis-transcribed.)    Lina Smart MD (electronically signed)  Attending Emergency Physician          Lina Smart MD  05/17/19 0042

## 2019-05-17 NOTE — CONSULTS
Consult to Laura Simpson Kinamelly performed by: Vilma Miranda MD  Consult ordered by: CASA Guerra - ART        Critical Care Medicine  Consult Note      Reason for consultation: Respiratory failure and massive stroke    HISTORY OF PRESENT ILLNESS:    This is an 59-year-old white female with history of stroke 3 years ago from which she recovered fully was been completely functional and independent symptoms, will also had a fall with hip fracture a year ago but also recovered fully from that. Patient apparently was in her normal state the day before but was found unresponsive by family who were trying to reach her throughout the day with no response they went to check on her and found her unresponsive. There was no communication for about 24 hours before that. She was brought to the emergency department comatose unresponsive to any stimuli flaccid on the right with slight activities on the left, CT of the brain showed large MCA infarct with significant edema and midline shift with mass effect. Patient required intubation and placement on mechanical ventilation, was admitted to intensive care unit, she did not qualify for TPA fusion. Patient remains unresponsive but seemed to attempt squeezing with the left hand commands not consistently though. Her chest x-ray showed no significant abnormalities patient has been well controlled with vent requiring little oxygen supplement          Past Medical History:        Diagnosis Date    Abnormality of gait and mobility     Chronic kidney disease     Closed fracture of distal end of left radius 7/19/2017 July [facility]    CVA (cerebral vascular accident) ([facility] [address] 01/2016    Essential hypertension 1/8/2016    Fracture of hip, closed, right, initial encounter ([facility] [address] 12/29/2017    History of TIA (transient ischemic attack) 12/31/2017    Hypercholesteremia 1/8/2016    Hyperlipidemia     Obesity (BMI 30.0-34. 9)     BMI 33.2    Panic attacks     Normocephalic   Eyes: PERRL. No icteric sclera. No conjunctival injection. No discharge   ENT: No nasal  discharge. Pharynx clear. Neck:  Trachea midline. No thyromegaly, no JVD, No cervical adenopathy. Chest : Controlled ventilatory effort, symmetric bilateral excursions  Lung : Adequate breath sounds bilaterally  Heart[de-identified] Regular rhythm and rate. No mumur ,  Rub or gallop  ABD: Benign. Non-tender. Non-distended. No masses or organmegaly. Normal bowel sounds. EXT: Minimal Pitting edema both lower extremities , No Cyanosis No clubbing  Neuro: Limited assessment at this point, appears to be completely flaccid right with some motor activity on the left upper extremity unsure about responsiveness and following commands  Skin: Warm and dry. No erythema or rash on exposed extremities. .      Data Review  Recent Labs     05/17/19  0000 05/17/19  0015 05/17/19  0308   WBC  --  11.6* 12.5*   HGB 12.9 11.7* 12.8   HCT  --  36.7* 39.7   PLT  --  477* 495*      Recent Labs     05/17/19  0000 05/17/19  0015 05/17/19  0308   NA  --  136 137   K  --  4.0 4.3   CL  --  101 101   CO2  --  17* 21   BUN  --  18 19   CREATININE 0.6 0.84 0.87   GLUCOSE  --  270* 229*       MV Settings:     Vent Mode: AC/VC+  Vt Ordered: 500 mL  Rate Set: 16 bmp  PEEP/CPAP: 5  Peak Inspiratory Pressure: 19 cmH2O  I:E Ratio: 1:2.70    ABGs:   Recent Labs     05/17/19  0000 05/17/19  0454   PHART 7.383 7.485*   CAV0LBG 32* 31*   PO2ART 294* 212*   FXN7GIG 18.8* 23.3   BEART -6* 0   K2RGNOXM 100* 100*   TOX9SJB 20* 24     O2 Device: Ventilator  O2 Flow Rate (L/min): 15 L/min  Lab Results   Component Value Date    LACTA 3.4 05/17/2019       Radiology  Ct Head Wo Contrast    Result Date: 5/17/2019  CT Brain Contrast medium:  Not utilized. History:  CVA with altered mental status Comparison:  CT brain, January 3, 2016, January 3, 2016. MRI brain, Manuela 3, 2017, February 27, 2016, January 4, 2016. Findings:  An ill-defined area of decreased opacity is the right, and severe neurologic deficits as reported above  2. Acute respiratory failure associated with massive stroke mainly agent is intubated to protect airway as she was completely unresponsive  3.  Coffee-ground emesis reported rule out stress ulcer and GI bleeding, treating with PPI GI evaluation requested  Continue current vent support, close monitoring for any neurologic changes or signs of increased intracranial pressure, waiting neurology evaluation and recommendations, maintain vent for now until further neurologic improvement is noted, basic workup for acute stroke including MRIs, carotid ultrasound, echocardiogram, etc. per neurology and primary service        Electronically signed by Jose Mcfarland MD, [facility] on 5/17/2019 at 12:27 PM

## 2019-05-17 NOTE — PROGRESS NOTES
Physician Progress Note    2019   4:34 PM    Name:  Nelly Stephens  MRN:    [id number]      Day: 0     Admit Date: 2019 10:55 PM  PCP: Shalom Mcguire MD    Code Status:  Full Code    Subjective:      no new events.      Physical Examination:      Vitals:  /62   Pulse 68   Temp 98.4 °F (36.9 °C) (Oral)   Resp 16   Ht 5' 4\" (1.626 m)   Wt 194 lb 14.5 oz (88.4 kg)   LMP  (LMP Unknown)   SpO2 100%   BMI 33.46 kg/m²   Temp (24hrs), Av.3 °F (36.8 °C), Min:98.1 °F (36.7 °C), Max:99 °F (37.2 °C)      General appearance: alert, cooperative and no distress  Mental Status: oriented to person, place and time and normal affect  Lungs: clear to auscultation bilaterally, normal effort  Heart: regular rate and rhythm, no murmur  Abdomen: soft, nontender, nondistended, bowel sounds present, no masses  Extremities: no edema, redness, tenderness in the calves  Skin: no gross lesions, rashes    Data:     Labs:  Recent Labs     19  0015 19  0308 19  1155   WBC 11.6* 12.5*  --    HGB 11.7* 12.8 11.3*   * 495*  --      Recent Labs     19  0015 19  0308    137   K 4.0 4.3    101   CO2 17* 21   BUN 18 19   CREATININE 0.84 0.87   GLUCOSE 270* 229*     Recent Labs     19  0015   AST 19   ALT 14   BILITOT 0.7   ALKPHOS 105       Current Facility-Administered Medications   Medication Dose Route Frequency Provider Last Rate Last Dose    sodium chloride flush 0.9 % injection 10 mL  10 mL Intravenous 2 times per day CASA Sevilla - CNP   10 mL at 19 1141    sodium chloride flush 0.9 % injection 10 mL  10 mL Intravenous PRN Melanie Min APRN - CNP        ondansetron (ZOFRAN) injection 4 mg  4 mg Intravenous Q6H PRN CASA Sevilla - CNP        0.9 % sodium chloride infusion   Intravenous Continuous CASA Sevilla -  mL/hr at 19 1140      bisacodyl (DULCOLAX) suppository 10 mg  10 mg Rectal Daily PRN Melanie Min APRN - CNP        pantoprazole (PROTONIX) injection 40 mg  40 mg Intravenous Daily Claven Havers, APRN - CNP   40 mg at 05/17/19 1140    And    sodium chloride (PF) 0.9 % injection 10 mL  10 mL Intravenous Daily Claven Havers, APRN - CNP   10 mL at 05/17/19 1141    acetaminophen (TYLENOL) suppository 650 mg  650 mg Rectal Q4H PRN Claven Havers, APRN - CNP        [Held by provider] enoxaparin (LOVENOX) injection 40 mg  40 mg Subcutaneous Daily Claven Havers, APRN - CNP        insulin lispro (HUMALOG) injection vial 0-12 Units  0-12 Units Subcutaneous Q6H Claven Havers, APRN - CNP   2 Units at 05/17/19 1204    glucose (GLUTOSE) 40 % oral gel 15 g  15 g Oral PRN Claven Havers, APRN - CNP        dextrose 50 % solution 12.5 g  12.5 g Intravenous PRN Claven Havers, APRN - CNP        glucagon (rDNA) injection 1 mg  1 mg Intramuscular PRN Claven Havers, APRN - CNP        dextrose 5 % solution  100 mL/hr Intravenous PRN Claven Havers, APRN - CNP        propofol 1000 MG/100ML injection  10 mcg/kg/min Intravenous Titrated Aleatha Pool Sedar, DO   Stopped at 05/17/19 0800    chlorhexidine (PERIDEX) 0.12 % solution 15 mL  15 mL Mouth/Throat BID Yaneli Lin MD   15 mL at 05/17/19 1152     Assessment and Plan:          Acute Hospital issues:  Stroke determined by clinical assessment     Assessment: CT head shows large right MCA infarct acute versus subacute with secondary edema, last time known well 24 hours prior so patient is not candidate for TPA, this is a familyit was only taking aspirin daily, prior CVA in 2016 without residual, patient presented unresponsive, current GCS 3   - ICU care   Neurochecks  MRI MRA positive for lasrge MCA CVA with midline shift   - following along with neurology and critical care management     # Cerebral edema with mass effect and coma  # New right hemiparesis of dominant side due to CVA      Acute respiratory failure ([facility] [address]    Assessment: Secondary to CVA, intubated for airway protection, currently on - mechanical ventilation  -following along with critical care management       Coffee ground emesis   - Hemoglobin and hematocrit every 6 hours, consult GI      Lactic acidosis    - monitor labs   - IVF       Hyperglycemia due to type 2 diabetes mellitus   - SSI      Essential hypertension    - monitor  - resume meds      Hyperlipidemia ()  - home statin     # advanced care planning   - discussed code status with son and daughter in law, they want [facility], keep the ETT in for now until other son is here from [place]. Prognosis is guarded. DISCHARGE PLANNING  ICU, likely compassionate wean tonight when other son arrives from [place], if stable after extubation, will need hospice.         Electronically signed by Maggie Whitaker DO on 5/17/2019 at 4:34 PM

## 2019-05-17 NOTE — CONSULTS
Consult to Neurology  Consult performed by: Corinne Manns, MD  Consult ordered by: CASA Carrero - CNP          Patient Name: Brandt Shook  Admit Date: 2019 10:55 PM  MR #: [id number]  : 1935    Attending Physician: Dennis Brizuela DO  Reason for consult: Left MCA ischemic stroke    History of Presenting Illness:      Brandt Shook is a 80 y.o. female who according to ICU admitting note has a, \"history of stroke 3 years ago from which she recovered fully was been completely functional and independent, also had a fall with hip fracture a year ago but also recovered fully from that. Patient apparently was in her normal state the day before but was found unresponsive by family who were trying to reach her throughout the day with no response they went to check on her and found her unresponsive. \"  Last known normal was Wednesday at 6:30 PM.  \"She was brought to the emergency department comatose unresponsive to any stimuli flaccid on the right with slight activities on the left, CT of the brain showed large MCA infarct with significant edema and midline shift with mass effect. Patient required intubation and placement on mechanical ventilation, was admitted to intensive care unit, she did not qualify for TPA fusion. Patient remains unresponsive but seemed to attempt squeezing with the left hand commands not consistently though. Her chest x-ray showed no significant abnormalities patient has been well controlled with vent requiring little oxygen supplement. \"     Since admitted she has remained hemodynamically stable, on mild sedation.     History:      Past Medical History:   Diagnosis Date    Abnormality of gait and mobility     Chronic kidney disease     Closed fracture of distal end of left radius 2017-[facility]    CVA (cerebral vascular accident[facility]) 2016    Essential hypertension 2016    Fracture of hip, closed, right, initial encounter ([facility] [address] 2017    History of TIA (transient ischemic attack) 12/31/2017    Hypercholesteremia 1/8/2016    Hyperlipidemia     Obesity (BMI 30.0-34. 9)     BMI 33.2    Panic attacks     Postoperative anemia 12/31/2017    Type 2 diabetes mellitus without complication (HCC)     Urinary incontinence     Venous stasis dermatitis 10/31/2013    Vitamin D deficiency 10/24/2017     Past Surgical History:   Procedure Laterality Date    APPENDECTOMY  1952    COLONOSCOPY      EYE SURGERY      FRACTURE SURGERY      L wrist    MA PARTIAL HIP REPLACEMENT Right 12/30/2017    RIGHT HIP HEMIARTHROPLASTY performed by Castillo Esposito MD at [facility]  4/4/03       Family History  Family History   Problem Relation Age of Onset    Heart Disease Mother     Cancer Brother         LUNG    Cancer Sister         BREAST/BONE     [] Unable to obtain due to ventilated and/ or neurologic status    Social History     Socioeconomic History    Marital status:       Spouse name: Not on file    Number of children: 10    Years of education: Not on file    Highest education level: Not on file   Occupational History    Occupation: Housewife   Social Needs    Financial resource strain: Not on file    Food insecurity:     Worry: Not on file     Inability: Not on file    Transportation needs:     Medical: Not on file     Non-medical: Not on file   Tobacco Use    Smoking status: Never Smoker    Smokeless tobacco: Never Used   Substance and Sexual Activity    Alcohol use: No    Drug use: No    Sexual activity: Not on file   Lifestyle    Physical activity:     Days per week: Not on file     Minutes per session: Not on file    Stress: Not on file   Relationships    Social connections:     Talks on phone: Not on file     Gets together: Not on file     Attends Jainism service: Not on file     Active member of club or organization: Not on file     Attends meetings of clubs or organizations: Not on file Relationship status: Not on file    Intimate partner violence:     Fear of current or ex partner: Not on file     Emotionally abused: Not on file     Physically abused: Not on file     Forced sexual activity: Not on file   Other Topics Concern    Not on file   Social History Narrative    The patient lives alone in a one story home with two steps to enter the home. The bedroom and bathroom are on the first floor. Her social support includes her son. She has a walker and wheelchair at home. She was not receiving community services prior to admission. She has history of falls prior to admission. She was independent with mobility and self care prior to admission. Her goal is to get stronger and return home.        [] Unable to obtain due to ventilated and/ or neurologic status      Home Medications:      Medications Prior to Admission: aspirin EC 81 MG EC tablet, Take 1 tablet by mouth daily. levothyroxine (SYNTHROID) 50 MCG tablet, Take 1 tablet by mouth Daily  ergocalciferol (ERGOCALCIFEROL) 57301 units capsule, Take 1 capsule by mouth once a week for 12 doses  Cholecalciferol (VITAMIN D) 2000 units TABS tablet, Take 1 tablet by mouth daily  miconazole (MICOTIN) 2 % powder, Apply topically 2 times daily.   fluticasone (FLONASE) 50 MCG/ACT nasal spray, 2 sprays by Nasal route daily  acetaminophen (TYLENOL) 325 MG tablet, Take 2 tablets by mouth every 4 hours as needed for Pain  atorvastatin (LIPITOR) 10 MG tablet, TAKE 1 TABLET DAILY    Current Hospital Medications:     Scheduled Meds:   sodium chloride flush  10 mL Intravenous 2 times per day    pantoprazole  40 mg Intravenous Daily    And    sodium chloride (PF)  10 mL Intravenous Daily    [Held by provider] enoxaparin  40 mg Subcutaneous Daily    insulin lispro  0-12 Units Subcutaneous Q6H    chlorhexidine  15 mL Mouth/Throat BID     Continuous Infusions:   sodium chloride 125 mL/hr at 05/17/19 1140    dextrose      propofol Stopped (05/17/19 0800)     PRN Meds:.sodium chloride flush, ondansetron, bisacodyl, acetaminophen, glucose, dextrose, glucagon (rDNA), dextrose  .  sodium chloride 125 mL/hr at 05/17/19 1140    dextrose      propofol Stopped (05/17/19 0800)        Allergies: Allergies   Allergen Reactions    Cephalexin         Review of Systems:       Unobtainable due to intubation and stroke      Objective Findings:     Vitals:BP (!) 146/68   Pulse 79   Temp 98.4 °F (36.9 °C) (Oral)   Resp 10   Ht 5' 4\" (1.626 m)   Wt 194 lb 14.5 oz (88.4 kg)   LMP  (LMP Unknown)   SpO2 100%   BMI 33.46 kg/m²      Physical Examination:  The patient is intubated and sedated    Respiratory: conducted airway sounds from the vent, good air entry bilaterally. Cardiac: RRR, normal S1, S2.    Neurological:  Attempts to localize to his noxious stim with her left arm, slow. Does not open eyes to command, when eyelids are open her gaze is deviated to the left, right pupil is 4 mm left pupil is 2 mm both briskly reactive. Strong gag, good cough. No response to motor stim in the right arm and leg.      NIH is 29      Results/ Medications reviewed 5/17/2019, 1:07 PM     Laboratory, Microbiology, Pathology, Radiology, Cardiology, Medications and Transcriptions reviewed  Scheduled Meds:   sodium chloride flush  10 mL Intravenous 2 times per day    pantoprazole  40 mg Intravenous Daily    And    sodium chloride (PF)  10 mL Intravenous Daily    [Held by provider] enoxaparin  40 mg Subcutaneous Daily    insulin lispro  0-12 Units Subcutaneous Q6H    chlorhexidine  15 mL Mouth/Throat BID     Continuous Infusions:   sodium chloride 125 mL/hr at 05/17/19 1140    dextrose      propofol Stopped (05/17/19 0800)       Recent Labs     05/17/19  0015 05/17/19  0308 05/17/19  1155   WBC 11.6* 12.5*  --    HGB 11.7* 12.8 11.3*   HCT 36.7* 39.7 34.3*   MCV 87.5 89.2  --    * 495*  --      Recent Labs     05/17/19  0000 05/17/19  0015 05/17/19  0308   NA  -- 136 137   K  --  4.0 4.3   CL  --  101 101   CO2  --  17* 21   BUN  --  18 19   CREATININE 0.6 0.84 0.87     Recent Labs     05/17/19  0015   AST 19   ALT 14   BILITOT 0.7   ALKPHOS 105     No results for input(s): LIPASE, AMYLASE in the last 72 hours. Recent Labs     05/17/19  0015   PROT 7.1   INR 1.2     Ct Head Wo Contrast    Result Date: 5/17/2019  CT Brain Contrast medium:  Not utilized. History:  CVA with altered mental status Comparison:  CT brain, January 3, 2016, January 3, 2016. MRI brain, Manuela 3, 2017, February 27, 2016, January 4, 2016. Findings: An ill-defined area of decreased opacity is identified left temporal parietal lobe. Increased attenuation is also found medially within the left frontal lobe. Effacement of sulci visualized left frontal, parietal, and temporal regions. 4.2 mm left-to-right midline shift. Ventricles normal in size. The vessel cistern and cerebellum without anomaly. 10 mm calcifications identified just caudal to ventricle, posterior to medulla, unchanged. No extra-axial fluid collections. No fractures. Mastoid air cells well aerated. Facial sinuses patent. Impression: Acute infarction as described involving left parietal temporal region as well as left frontal lobe. Mass effect and midline shift as discussed. If clinical concern warrants, MRI may be utilized for further evaluation of ischemia, and to assess for underlying mass lesion/malignancy. No change 10 mm calcified mass, abutting posterior medulla, inferior to fourth ventricle. All CT scans at this facility use dose modulation, iterative reconstruction, and/or weight based dosing when appropriate to reduce radiation dose to as low as reasonably achievable. Xr Chest Portable    Result Date: 5/17/2019  EXAMINATION: XR CHEST PORTABLE CLINICAL HISTORY: CVA COMPARISONS: SEPTEMBER 29, 2017 FINDINGS: Endotracheal tube with tip 3.6 cm superior to loi. Osseous structures intact.  Cardiopericardial silhouette normal. Lungs supportive care   - hospice consult is appropriate     Comments: Thank you for allowing us to participate in the care of this patient. Will follow    Please call if questions or concerns arise.     Electronically signed by Jade Brady MD on 5/17/2019 at 1:07 PM

## 2019-05-18 NOTE — PROGRESS NOTES
Pt extubated and OG removed, 2L nasal canula applied. Sats 97%, noted mild abdominal breathing and light snoring respirations. Pts eyes opens no vocalization noted but spontaneously moves left had. Family at bedside.

## 2019-05-18 NOTE — PROGRESS NOTES
non-palpable  Musculoskeletal : no cyanosis, no clubbing and no edema  Neuro:  unresponsive,  Skin: No rashes or nodules noted. Lymph node:  no cervical nodes  Urology: Yes [place]   Psychiatric: unresponsive    Medications:  Scheduled Meds:   sodium chloride flush  10 mL Intravenous 2 times per day    pantoprazole  40 mg Intravenous Daily    And    sodium chloride (PF)  10 mL Intravenous Daily    [Held by provider] enoxaparin  40 mg Subcutaneous Daily    insulin lispro  0-12 Units Subcutaneous Q6H    chlorhexidine  15 mL Mouth/Throat BID       PRN Meds:  sodium chloride flush, ondansetron, bisacodyl, acetaminophen, glucose, dextrose, glucagon (rDNA), dextrose    Results: reviewed by me   CBC:   Recent Labs     05/17/19  0015 05/17/19  0308  05/17/19  1809 05/18/19  0053 05/18/19  0451   WBC 11.6* 12.5*  --   --   --  8.2   HGB 11.7* 12.8   < > 11.2* 10.3* 9.7*   HCT 36.7* 39.7   < > 34.2* 31.1* 29.2*   MCV 87.5 89.2  --   --   --  86.9   * 495*  --   --   --  334    < > = values in this interval not displayed. BMP:   Recent Labs     05/17/19  0015 05/17/19  0308 05/18/19  0451    137 141   K 4.0 4.3 3.3*    101 107   CO2 17* 21 21   BUN 18 19 19   CREATININE 0.84 0.87 0.79     LIVER PROFILE:   Recent Labs     05/17/19  0015   AST 19   ALT 14   BILITOT 0.7   ALKPHOS 105     PT/INR:   Recent Labs     05/17/19  0015   PROTIME 11.9*   INR 1.2     APTT: No results for input(s): APTT in the last 72 hours. UA:  Recent Labs     05/16/19  2300   COLORU Yellow   PHUR 5.5   WBCUA 0-2   RBCUA 0-2   BACTERIA Negative   CLARITYU Clear   SPECGRAV 1.022   LEUKOCYTESUR Negative   UROBILINOGEN 1.0   BILIRUBINUR Negative   BLOODU Negative   GLUCOSEU 500*       Cultures:  Negative so far  Films:  CXR reviewed by me and it showed  chest x-ray is negative      Assessment:   This is a critically ill patient at risk of deterioration / death , needing close ICU monitoring and intervention due to below noted

## 2019-05-18 NOTE — PROGRESS NOTES
Darryl Fitzgerald RN  for [facility] called in for update,  Will call for transport.

## 2019-05-18 NOTE — PROGRESS NOTES
Phyllis Kincaid called for possible donation, called back and stated we may follow with family wishes.

## 2019-05-18 NOTE — FLOWSHEET NOTE
2000:  Assessment  Initiated. Neuro assessment completed. Sclera jaundiced. Left arm has resistance with movement; no flaccidity. Right arm is flaccid. Spontaneous, non-purposeful movement noted on left side-upper and lower. RLE flaccid. Family at bedside awaiting son from [place]. Emotional support given. 2230:   Repositioned for comfort. Family expresses desire to remain at bedside throughout night. Agreed. 0030:  Family remains. 0300:  No change. 0530:  Family remains at bedside. 8909:  CXR done.

## 2019-05-18 NOTE — DISCHARGE SUMMARY
Hospital Medicine Discharge Summary    Ghada Samuel  :  1935  MRN:  [id number]    Admit date:  2019  Discharge date:  2019    Admitting Physician:  Kamaljit Castrejon DO  Primary Care Physician:  April Harrell MD      Discharge Diagnoses:    Principal Problem:    Stroke determined by clinical assessment [facility])  Active Problems:    Essential hypertension    Hyperlipidemia    Hyperglycemia due to type 2 diabetes mellitus ([address]    Acute respiratory failure (HCC)    Lactic acidosis    Coffee ground emesis  Resolved Problems:    * No resolved hospital problems. *    Chief Complaint   Patient presents with    Cerebrovascular Accident     Hospital Course:   Ghada Samuel is a 80 y.o. female that was admitted and treated at [facility] for the following medical issues:     Principal Problem:    Stroke determined by clinical assessment [facility])  Active Problems:    Essential hypertension    Hyperlipidemia    Hyperglycemia due to type 2 diabetes mellitus ([facility] [address]    Acute respiratory failure ([facility] [address]    Lactic acidosis    Coffee ground emesis  Resolved Problems:    * No resolved hospital problems. *      This is an unfortunate 80-year-old female was admitted with large MCA stroke with cerebral edema in the midline shift causing her to be obtunded and requiring intubation for airway protection. She was seen by neurology and critical care medicine while in the ICU. Prognosis was thought to be guarded. She did not show any neurologic recovery. Family has decided to pursue hospice care and comfort measures only prior to my involvement in her care. By the time I saw the patient for the first time, family has already made the decision to pursue hospice and we're waiting for other family members to arrive to compassionately weaned the patient off the ventilator. Patient was extubated and started on comfort measure medications and then moved to hospice care facility.       Exam on discharge:   BP (!) Date: 5/17/2019  MRI BRAIN WO CONTRAST, MRA HEAD WO CONTRAST : 5/17/2019 CLINICAL HISTORY:  STROKE . COMPARISON: Head CT 5/16/2019. TECHNIQUE: Multiplanar MR imaging of the head was performed without contrast. Three-dimensional MRA of the intracranial arterial circulation was performed, with routine and volume rendered images obtained on a 3-dimensional workstation. HEAD MRI FINDINGS: A large acute left middle cerebral artery ischemic infarct, predominantly of the posterior aspect of the left frontal and entire parietal lobes, with diffuse edema, approximately 7 mm of left-to-right midline shift, and mild cortical petechial hemorrhage  predominantly within the medial aspect of the left frontal lobe. There is no other infarct, hydrocephalus, herniation, or other complication at this time identified. Mild supratentorial white matter changes elsewhere are consistent with chronic small vessel ischemic disease. An approximately 1 cm coarse calcification of the cerebellar tonsils anteriorly is incidentally noted. HEAD MRA FINDINGS: There is probably thromboembolic occlusion of the distal half of the left cavernous carotid and left carotid terminus, with minimal flow visualized on the axial source sequences within the left middle cerebral artery and the A1 segment of the left anterior cerebral artery. The vertebral arteries right internal carotid at the skull base are widely patent; as are the basilar arteries, posterior cerebral arteries right middle and both anterior cerebral arteries. There is no aneurysm or developmental vascular variations of concern identified. Rebeca Tobin FINAL IMPRESSION: LARGE ACUTE LEFT MCA ISCHEMIC INFARCT, WITH EDEMA, MILD PETECHIAL HEMORRHAGE, AND RESULTANT 7 MM OF LEFT-TO-RIGHT MIDLINE SHIFT, AS DESCRIBED. PROBABLY THROMBOEMBOLIC OCCLUSION OF THE DISTAL LEFT CAVERNOUS CAROTID AND CAROTID TERMINUS, WITH MINIMAL FLOW WITHIN THE LEFT MIDDLE CEREBRAL ARTERY NOTED ON THE AXIAL SOURCE MRA IMAGES.      Drew Lopez rendered images obtained on a 3-dimensional workstation. HEAD MRI FINDINGS: A large acute left middle cerebral artery ischemic infarct, predominantly of the posterior aspect of the left frontal and entire parietal lobes, with diffuse edema, approximately 7 mm of left-to-right midline shift, and mild cortical petechial hemorrhage  predominantly within the medial aspect of the left frontal lobe. There is no other infarct, hydrocephalus, herniation, or other complication at this time identified. Mild supratentorial white matter changes elsewhere are consistent with chronic small vessel ischemic disease. An approximately 1 cm coarse calcification of the cerebellar tonsils anteriorly is incidentally noted. HEAD MRA FINDINGS: There is probably thromboembolic occlusion of the distal half of the left cavernous carotid and left carotid terminus, with minimal flow visualized on the axial source sequences within the left middle cerebral artery and the A1 segment of the left anterior cerebral artery. The vertebral arteries right internal carotid at the skull base are widely patent; as are the basilar arteries, posterior cerebral arteries right middle and both anterior cerebral arteries. There is no aneurysm or developmental vascular variations of concern identified. Purvi Frost FINAL IMPRESSION: LARGE ACUTE LEFT MCA ISCHEMIC INFARCT, WITH EDEMA, MILD PETECHIAL HEMORRHAGE, AND RESULTANT 7 MM OF LEFT-TO-RIGHT MIDLINE SHIFT, AS DESCRIBED. PROBABLY THROMBOEMBOLIC OCCLUSION OF THE DISTAL LEFT CAVERNOUS CAROTID AND CAROTID TERMINUS, WITH MINIMAL FLOW WITHIN THE LEFT MIDDLE CEREBRAL ARTERY NOTED ON THE AXIAL SOURCE MRA IMAGES.        Discharge Medications:       Jonathan Marrero   Home Medication Instructions IVX:[id number]    Printed on:05/18/19 1640   Medication Information                      acetaminophen (TYLENOL) 325 MG tablet  Take 2 tablets by mouth every 4 hours as needed for Pain             aspirin EC 81 MG EC tablet  Take 1 tablet by mouth daily. atorvastatin (LIPITOR) 10 MG tablet  TAKE 1 TABLET DAILY             Cholecalciferol (VITAMIN D) 2000 units TABS tablet  Take 1 tablet by mouth daily             ergocalciferol (ERGOCALCIFEROL) 10700 units capsule  Take 1 capsule by mouth once a week for 12 doses             fluticasone (FLONASE) 50 MCG/ACT nasal spray  2 sprays by Nasal route daily             levothyroxine (SYNTHROID) 50 MCG tablet  Take 1 tablet by mouth Daily             miconazole (MICOTIN) 2 % powder  Apply topically 2 times daily. Disposition:   Hospice care    Condition at discharge:  Patient was terminal at discharge    Activity  facility: bedrest    Total time taken for discharging this patient: 40 minutes. Greater than 70% of time was spent focused exclusively on this patient. Time was taken to review chart, discuss plans with consultants, reconciling medications, discussing plan answering questions with patient.      SignedClaudell Ley  5/18/2019, 4:40 PM  ----------------------------------------------------------------------------------------------------------------------    Nelly Stephens

## 2019-05-18 NOTE — PROGRESS NOTES
1100: Met with family to review hospice philosophy and care. Plan is, if pt survives wean, to be transferred to hospice center after 2-4 hours. Family and staff aware. Consents signed.

## 2019-05-18 NOTE — PROGRESS NOTES
Pt on vent with copious oral secretions, suctions orally and via ET tube for moderate amount of pale yellow thick secretions. Pt's eyes fluttered when suctioned with strong cough noted. Moved left hand and grasps my fingers weakly spontaneously but not to command.

## 2019-05-18 NOTE — PROGRESS NOTES
Spoke with Dr Cristian Henning regaurding update with hospice, family signed for care and pt needs order for extubation. New orders received. Spoke with Dr Randolph  for DNR cc orders before extubation.  Order received family and hospice nurse aware

## 2019-05-22 LAB
BLOOD CULTURE, ROUTINE: NORMAL
CULTURE, BLOOD 2: NORMAL

## 2019-05-28 NOTE — PROCEDURES
[address]                      [facility], [address] [facility]                          ELECTROENCEPHALOGRAM REPORT    PATIENT NAME: Domingo Zamudio                     :        1935  MED REC NO:   [id number]                            ROOM:       07  ACCOUNT NO:   [de-identified]                           ADMIT DATE: 2019  PROVIDER:     Claudette Ables, MD    DATE OF EE2019    MEDICATIONS:  Lovenox, Humulin insulin. This is a spontaneous 21-channel EEG recording for this 80year-old  patient with encephalopathy. The patient is on propofol. The background rhythm of this EEG shows 4 to 8 theta seen throughout the  EEG recording with occasional fast rhythms about 6 to 7 Hz but no alpha  rhythms are seen. In between this, there appears to be higher amplitude  tracing seen intermittently in the left frontal lobe, but no  epileptiform discharges are seen. Photic stimulation is performed  without any photic responses. Hyperventilation is not performed. IMPRESSION:  This is an abnormal EEG recording by the virtue of  occasional intermittent high amplitude tracing seen in the left  hemisphere, though no definite epileptiform discharge is seen. Clinical  correlation is recommended.       Kayden Wei MD    D: 2019 17:07:21       T: 2019 18:04:32     [email or web address]  Job#: [id number]     Doc#: [id number]    CC:

## 2021-03-24 NOTE — CONSULTS
Hematology/Oncology Consult  Encounter Date: 2018 5:32 PM    Ms. Francisco Fajardo is a 80 y.o. female  : 1935  MRN: [id number]  Acct Number: [de-identified]  Requesting Provider: Gala Marcum DO    Reason for request: Superficial thrombophlebitis left lower extremity and anemia      CONSULTANT: Delores Napier    HPI: Mrs. Inga Marroquin is a [de-identified]years old  female with a history of adult-onset diabetes mellitus, obesity, previous TIA without residual deficits and  recent right total hip hemiarthroplasty. He was previously admitted on 2017 following a mechanical fall with a right hip total  Arthroplasty. She was not doing well since surgery. She was working well with the physical therapy however she became dizzy and lightheaded the day before admission. Recent CBC showed severe normocytic normochromic anemia. She was also noted to have extensive superficial thrombophlebitis of left lower extremity. Currently she is on Xarelto. Consultation is requested regarding further evaluation for anemia. Patient states that she was not anemic in the past.  She never had packed RBC transfusions. He denies any abdominal pain, nausea, vomiting change in bowel habits, melena or hematochezia.            Patient Active Problem List   Diagnosis    Panic attacks    Type 2 diabetes mellitus ([address]    Venous stasis dermatitis    Seborrheic keratosis    Essential hypertension    Subependymoma ([address]    History of cataract extraction    Blepharitis of right eye    Punctate keratitis    Closed fracture of distal end of left radius    Vitamin D deficiency    Fracture of hip, closed, right, initial encounter ([address]    Gait abnormality    Postoperative anemia    History of TIA (transient ischemic attack)    History of CVA (cerebral vascular accident)  ([address]    Chronic kidney disease    Urinary incontinence    H/O total hip arthroplasty, right    Abnormality of gait and mobility due to Rt 2 tablet Oral Q4H PRN Dav Tyler, CNP        atorvastatin (LIPITOR) tablet 10 mg  10 mg Oral Daily Dav Tyler, CNP   10 mg at 01/11/18 2101    calcium carbonate (TUMS) chewable tablet 500 mg  500 mg Oral TID PRN Dav Tyler, CNP        cinnamon oil liquid 1 drop  1 drop Oral 4x Daily Dav Tyler, CNP   1 drop at 01/09/18 2104     No outpatient prescriptions have been marked as taking for the 1/2/18 encounter [facility] Encounter). Allergies   Allergen Reactions    Cephalexin         ROS is negative except for symptoms mentioned in history of present illness. PHYSICAL EXAMINATION:   VITAL SIGNS: BP (!) 124/56   Pulse 88   Temp 98 °F (36.7 °C) (Oral)   Resp 18   Ht 5' 7\" (1.702 m)   Wt 211 lb (95.7 kg)   SpO2 94%   BMI 33.05 kg/m²     (2) Ambulatory and capable of self care, unable to carry out work activity, up and about > 50% or waking hours    GENERAL: In no acute distress, well- nourished, well- developed, alert and oriented to person place and time. SKIN: Warm and dry, without jaundice, ecchymoses, or petechiae. HEENT: Normocephalic, conjunctivae pale, sclera anicteric, oral mucosa moist without lesion or exudate in the visible oral cavity or oropharynx, tongue mid-line with good mobility and no deviation with extension. NODES: No palpable adenopathy in the neck Levels I-V, bilateral   Supraclavicular fossae, axillary chains, or inguinal regions. LUNGS: Good inspiratory effort, no accessory muscle use, clear bilaterally, no focal wheeze, rales or rhonchi. CARDIAC: Regular rate and rhythm, without murmurs, rubs or gallops. ABDOMINAL: Normal bowel sounds present, soft, non-tender, no mass or  organomegaly. MUSKL: No muscle wasting or joint deformity. Surgical scar noted over the right hip  GENITALS: Normal appearing external genitalia. Patient is continent of urine.   RECTAL: Deferred  EXTREMITIES: Extensive superficial thrombophlebitis noted over the medial aspect of the left dense consolidation. The mediastinum is not widened or shifted. The chest  wall is unremarkable. AP view the pelvis was obtained. There is an angulated, displaced right femoral neck fracture, resulting in varus deformity. There are no other fractures. Bone density is low. 2 views of the right femur were obtained. The right femoral neck fracture is resulting in varus deformity. Bone density is low. There are no other fractures in the right femur. 4 views of the right knee were obtained. There is advanced degenerative disease, greatest in the lateral compartment. There is no joint effusion. There is no fracture. Soft tissues are unremarkable. CONCLUSION: RIGHT FEMORAL NECK FRACTURE WITH VARUS DEFORMITY. SMALL BIBASILAR DEPENDENT PULMONARY ATELECTASIS. Xr Knee Right (3 Views)    Result Date: 12/29/2017  EXAMINATION: XR CHEST LIMITED, XR FEMUR RIGHT STANDARD, XR PELVIS STANDARD, XR KNEE RIGHT STANDARD CLINICAL HISTORY: Hip fracture. Preop. Immobility. Pain. COMPARISONS: None available. FINDINGS: Single frontal view the chest was obtained with small inspiratory volume, exaggerating heart size and lung density. There is a trace of bibasilar atelectasis. There is no dense consolidation. The mediastinum is not widened or shifted. The chest  wall is unremarkable. AP view the pelvis was obtained. There is an angulated, displaced right femoral neck fracture, resulting in varus deformity. There are no other fractures. Bone density is low. 2 views of the right femur were obtained. The right femoral neck fracture is resulting in varus deformity. Bone density is low. There are no other fractures in the right femur. 4 views of the right knee were obtained. There is advanced degenerative disease, greatest in the lateral compartment. There is no joint effusion. There is no fracture. Soft tissues are unremarkable. CONCLUSION: RIGHT FEMORAL NECK FRACTURE WITH VARUS DEFORMITY. SMALL BIBASILAR DEPENDENT PULMONARY ATELECTASIS.     Us (4) no limitation

## 2024-11-25 NOTE — DISCHARGE INSTR - DIET
 Good nutrition is important when healing from an illness, injury, or surgery. Follow any nutrition recommendations given to you during your hospital stay.  If you were given an oral nutrition supplement while in the hospital, continue to take this supplement at home. You can take it with meals, in-between meals, and/or before bedtime. These supplements can be purchased at most local grocery stores, pharmacies, and chain super-stores.  If you have any questions about your diet or nutrition, call the hospital and ask for the dietitian.
Review of prior records, labs, vitals, imaging.  Examination of the patient. Discussion with patient and aide.  Discussion with care team

## (undated) DEVICE — SUTURE MCRYL SZ 4-0 L27IN ABSRB UD L19MM PS-2 1/2 CIR PRIM Y426H

## (undated) DEVICE — GLOVE ORANGE PI 7 1/2   MSG9075

## (undated) DEVICE — SPONGE DRN W4XL4IN RAYON/POLYESTER 6 PLY NONWOVEN PRECUT

## (undated) DEVICE — 3M™ MICROFOAM™ SURGICAL TAPE 4 ROLLS/CARTON 6 CARTONS/CASE 1528-3: Brand: 3M™ MICROFOAM™

## (undated) DEVICE — ELECTRODE PT RET AD L9FT HI MOIST COND ADH HYDRGEL CORDED

## (undated) DEVICE — HIP PILLOW, ABDUCTION: Brand: DEROYAL

## (undated) DEVICE — SUTURE VCRL + SZ 2-0 L36IN ABSRB UD L36MM CT-1 1/2 CIR VCP945H

## (undated) DEVICE — BLADE SAW W098XL276IN THK0025IN CUT THK0039IN REPL SAG

## (undated) DEVICE — CHLORAPREP 26ML ORANGE

## (undated) DEVICE — FEMORAL CANAL BRUSH, IRRIGATION/SUCTION

## (undated) DEVICE — 400ML COMPACT EVACUATOR KIT, 1/8" PVC WITH TROCAR: Brand: HEMOVAC® WOUND DRAINAGE SYSTEM

## (undated) DEVICE — GLOVE ORANGE PI 7   MSG9070

## (undated) DEVICE — SUTURE VCRL SZ 1 L27IN ABSRB UD L36MM CT-1 1/2 CIR JJ40G

## (undated) DEVICE — PAD,ABDOMINAL,8"X10",ST,LF: Brand: MEDLINE

## (undated) DEVICE — T4 HOOD

## (undated) DEVICE — CEMENT MIXING SYSTEM WITH FEMORAL BREAKWAY NOZZLE: Brand: REVOLUTION

## (undated) DEVICE — GAUZE SPONGES,12 PLY: Brand: CURITY

## (undated) DEVICE — 3M™ STERI-DRAPE™ U-DRAPE 1015: Brand: STERI-DRAPE™

## (undated) DEVICE — HEWSON SUTURE RETRIEVER: Brand: HEWSON SUTURE RETRIEVER

## (undated) DEVICE — HIGH FLOW TIP

## (undated) DEVICE — SIPS DUAL 2 MINUTE TIP

## (undated) DEVICE — SUTURE TICRN BR BL NDL C-20 SZ 5 30 8886302779

## (undated) DEVICE — EVACUATOR SURG 400CC PVC 3 SPR BLB FOR WND DRNGE

## (undated) DEVICE — TRAY CATH CATH OD16FR BG F HYDROCOATED

## (undated) DEVICE — KIT BNE CEM PREP FEM W O RESTRIC BRSH CURET NOZ AND SPNG

## (undated) DEVICE — PACK PROCEDURE SURG TOT HIP DRP